# Patient Record
Sex: FEMALE | Race: WHITE | Employment: FULL TIME | URBAN - METROPOLITAN AREA
[De-identification: names, ages, dates, MRNs, and addresses within clinical notes are randomized per-mention and may not be internally consistent; named-entity substitution may affect disease eponyms.]

---

## 2017-07-12 ENCOUNTER — OFFICE VISIT (OUTPATIENT)
Dept: FAMILY MEDICINE CLINIC | Age: 37
End: 2017-07-12

## 2017-07-12 VITALS
WEIGHT: 228.8 LBS | HEIGHT: 68 IN | OXYGEN SATURATION: 98 % | RESPIRATION RATE: 13 BRPM | HEART RATE: 90 BPM | SYSTOLIC BLOOD PRESSURE: 118 MMHG | BODY MASS INDEX: 34.68 KG/M2 | DIASTOLIC BLOOD PRESSURE: 84 MMHG

## 2017-07-12 DIAGNOSIS — Z13.220 LIPID SCREENING: ICD-10-CM

## 2017-07-12 DIAGNOSIS — Z23 NEED FOR TDAP VACCINATION: ICD-10-CM

## 2017-07-12 DIAGNOSIS — Z13.29 THYROID DISORDER SCREENING: ICD-10-CM

## 2017-07-12 DIAGNOSIS — M25.50 ARTHRALGIA, UNSPECIFIED JOINT: ICD-10-CM

## 2017-07-12 DIAGNOSIS — K21.9 GASTROESOPHAGEAL REFLUX DISEASE WITHOUT ESOPHAGITIS: ICD-10-CM

## 2017-07-12 DIAGNOSIS — R14.0 ABDOMINAL BLOATING: Primary | ICD-10-CM

## 2017-07-12 DIAGNOSIS — K59.01 SLOW TRANSIT CONSTIPATION: ICD-10-CM

## 2017-07-12 LAB
BILIRUBIN, POC: NORMAL
BLOOD URINE, POC: NORMAL
CLARITY, POC: CLEAR
COLOR, POC: YELLOW
GLUCOSE URINE, POC: NORMAL
KETONES, POC: NORMAL
LEUKOCYTE EST, POC: NORMAL
NITRITE, POC: NORMAL
PH, POC: 5.5
PROTEIN, POC: NORMAL
SPECIFIC GRAVITY, POC: 1.02
UROBILINOGEN, POC: 0.2

## 2017-07-12 PROCEDURE — 99204 OFFICE O/P NEW MOD 45 MIN: CPT | Performed by: NURSE PRACTITIONER

## 2017-07-12 PROCEDURE — 90471 IMMUNIZATION ADMIN: CPT | Performed by: NURSE PRACTITIONER

## 2017-07-12 PROCEDURE — 90715 TDAP VACCINE 7 YRS/> IM: CPT | Performed by: NURSE PRACTITIONER

## 2017-07-12 PROCEDURE — 81002 URINALYSIS NONAUTO W/O SCOPE: CPT | Performed by: NURSE PRACTITIONER

## 2017-07-12 RX ORDER — MELOXICAM 15 MG/1
15 TABLET ORAL DAILY
Qty: 30 TABLET | Refills: 3 | Status: SHIPPED | OUTPATIENT
Start: 2017-07-12 | End: 2017-11-06 | Stop reason: ALTCHOICE

## 2017-07-12 RX ORDER — FAMOTIDINE 20 MG/1
20 TABLET, FILM COATED ORAL 2 TIMES DAILY
Qty: 60 TABLET | Refills: 3 | Status: SHIPPED | OUTPATIENT
Start: 2017-07-12 | End: 2017-09-05 | Stop reason: ALTCHOICE

## 2017-07-12 ASSESSMENT — ENCOUNTER SYMPTOMS
CHEST TIGHTNESS: 0
CONSTIPATION: 1
NAUSEA: 0
BLOOD IN STOOL: 0
BACK PAIN: 1
COUGH: 0
VOMITING: 0
ABDOMINAL PAIN: 1

## 2017-07-12 ASSESSMENT — PATIENT HEALTH QUESTIONNAIRE - PHQ9
SUM OF ALL RESPONSES TO PHQ9 QUESTIONS 1 & 2: 0
1. LITTLE INTEREST OR PLEASURE IN DOING THINGS: 0
SUM OF ALL RESPONSES TO PHQ QUESTIONS 1-9: 0
2. FEELING DOWN, DEPRESSED OR HOPELESS: 0

## 2017-08-09 ENCOUNTER — OFFICE VISIT (OUTPATIENT)
Dept: FAMILY MEDICINE CLINIC | Age: 37
End: 2017-08-09

## 2017-08-09 VITALS
OXYGEN SATURATION: 98 % | BODY MASS INDEX: 35.07 KG/M2 | SYSTOLIC BLOOD PRESSURE: 122 MMHG | HEART RATE: 65 BPM | DIASTOLIC BLOOD PRESSURE: 80 MMHG | HEIGHT: 68 IN | WEIGHT: 231.4 LBS | RESPIRATION RATE: 11 BRPM

## 2017-08-09 DIAGNOSIS — K21.9 GASTROESOPHAGEAL REFLUX DISEASE WITHOUT ESOPHAGITIS: ICD-10-CM

## 2017-08-09 DIAGNOSIS — K58.9 IRRITABLE BOWEL SYNDROME WITHOUT DIARRHEA: ICD-10-CM

## 2017-08-09 DIAGNOSIS — R14.0 ABDOMINAL BLOATING: Primary | ICD-10-CM

## 2017-08-09 PROCEDURE — 99213 OFFICE O/P EST LOW 20 MIN: CPT | Performed by: NURSE PRACTITIONER

## 2017-08-09 RX ORDER — DICYCLOMINE HCL 20 MG
20 TABLET ORAL DAILY PRN
Qty: 30 TABLET | Refills: 3 | Status: SHIPPED | OUTPATIENT
Start: 2017-08-09 | End: 2017-09-05 | Stop reason: ALTCHOICE

## 2017-08-09 ASSESSMENT — ENCOUNTER SYMPTOMS
COUGH: 0
VOMITING: 0
CHEST TIGHTNESS: 0
BACK PAIN: 0
NAUSEA: 0
CONSTIPATION: 0
DIARRHEA: 0

## 2017-09-08 ENCOUNTER — OFFICE VISIT (OUTPATIENT)
Dept: FAMILY MEDICINE CLINIC | Age: 37
End: 2017-09-08

## 2017-09-08 VITALS
TEMPERATURE: 98.5 F | DIASTOLIC BLOOD PRESSURE: 82 MMHG | BODY MASS INDEX: 35.71 KG/M2 | RESPIRATION RATE: 18 BRPM | WEIGHT: 228 LBS | OXYGEN SATURATION: 97 % | SYSTOLIC BLOOD PRESSURE: 126 MMHG | HEART RATE: 86 BPM

## 2017-09-08 DIAGNOSIS — L53.9 SKIN ERYTHEMA: ICD-10-CM

## 2017-09-08 DIAGNOSIS — L03.011 PARONYCHIA, FINGER, RIGHT: ICD-10-CM

## 2017-09-08 PROCEDURE — 99213 OFFICE O/P EST LOW 20 MIN: CPT | Performed by: NURSE PRACTITIONER

## 2017-09-13 ASSESSMENT — ENCOUNTER SYMPTOMS
SHORTNESS OF BREATH: 0
COUGH: 0
WHEEZING: 0
RESPIRATORY NEGATIVE: 1

## 2017-09-28 ENCOUNTER — HOSPITAL ENCOUNTER (OUTPATIENT)
Dept: GENERAL RADIOLOGY | Age: 37
Discharge: HOME | End: 2017-09-28

## 2017-09-28 LAB
A/G RATIO: 1.1 (ref 1.1–2.2)
ALBUMIN SERPL-MCNC: 4.2 G/DL (ref 3.4–5)
ALP BLD-CCNC: 84 U/L (ref 40–129)
ALT SERPL-CCNC: 20 U/L (ref 10–40)
ANION GAP SERPL CALCULATED.3IONS-SCNC: 18 MMOL/L (ref 3–16)
AST SERPL-CCNC: 18 U/L (ref 15–37)
BASOPHILS ABSOLUTE: 0 K/UL (ref 0–0.2)
BASOPHILS RELATIVE PERCENT: 0 %
BILIRUB SERPL-MCNC: 0.3 MG/DL (ref 0–1)
BUN BLDV-MCNC: 10 MG/DL (ref 7–20)
CALCIUM SERPL-MCNC: 9.5 MG/DL (ref 8.3–10.6)
CHLORIDE BLD-SCNC: 100 MMOL/L (ref 99–110)
CHOLESTEROL, FASTING: 229 MG/DL (ref 0–199)
CO2: 20 MMOL/L (ref 21–32)
CREAT SERPL-MCNC: 0.6 MG/DL (ref 0.6–1.1)
EOSINOPHILS ABSOLUTE: 0.2 K/UL (ref 0–0.6)
EOSINOPHILS RELATIVE PERCENT: 2 %
GFR AFRICAN AMERICAN: >60
GFR NON-AFRICAN AMERICAN: >60
GLOBULIN: 3.9 G/DL
GLUCOSE FASTING: 100 MG/DL (ref 70–99)
HCT VFR BLD CALC: 39.9 % (ref 36–48)
HDLC SERPL-MCNC: 42 MG/DL (ref 40–60)
HEMOGLOBIN: 13.7 G/DL (ref 12–16)
HYPOCHROMIA: ABNORMAL
LDL CHOLESTEROL CALCULATED: 168 MG/DL
LYMPHOCYTES ABSOLUTE: 2.4 K/UL (ref 1–5.1)
LYMPHOCYTES RELATIVE PERCENT: 29 %
MCH RBC QN AUTO: 30 PG (ref 26–34)
MCHC RBC AUTO-ENTMCNC: 34.3 G/DL (ref 31–36)
MCV RBC AUTO: 87.4 FL (ref 80–100)
MONOCYTES ABSOLUTE: 1 K/UL (ref 0–1.3)
MONOCYTES RELATIVE PERCENT: 12 %
NEUTROPHILS ABSOLUTE: 4.7 K/UL (ref 1.7–7.7)
NEUTROPHILS RELATIVE PERCENT: 57 %
PDW BLD-RTO: 12.8 % (ref 12.4–15.4)
PLATELET # BLD: 290 K/UL (ref 135–450)
PMV BLD AUTO: 9.3 FL (ref 5–10.5)
POTASSIUM SERPL-SCNC: 4.3 MMOL/L (ref 3.5–5.1)
RBC # BLD: 4.56 M/UL (ref 4–5.2)
RHEUMATOID FACTOR: <10 IU/ML
SEDIMENTATION RATE, ERYTHROCYTE: 33 MM/HR (ref 0–20)
SODIUM BLD-SCNC: 138 MMOL/L (ref 136–145)
TOTAL PROTEIN: 8.1 G/DL (ref 6.4–8.2)
TRIGLYCERIDE, FASTING: 94 MG/DL (ref 0–150)
TSH REFLEX: 1.22 UIU/ML (ref 0.27–4.2)
URIC ACID, SERUM: 5.2 MG/DL (ref 2.6–6)
VLDLC SERPL CALC-MCNC: 19 MG/DL
WBC # BLD: 8.2 K/UL (ref 4–11)

## 2017-10-03 ENCOUNTER — TELEPHONE (OUTPATIENT)
Dept: FAMILY MEDICINE CLINIC | Age: 37
End: 2017-10-03

## 2017-10-03 DIAGNOSIS — G89.29 CHRONIC PAIN OF BOTH KNEES: Primary | ICD-10-CM

## 2017-10-03 DIAGNOSIS — M25.561 CHRONIC PAIN OF BOTH KNEES: Primary | ICD-10-CM

## 2017-10-03 DIAGNOSIS — M25.562 CHRONIC PAIN OF BOTH KNEES: Primary | ICD-10-CM

## 2017-10-10 ENCOUNTER — OFFICE VISIT (OUTPATIENT)
Dept: ORTHOPEDIC SURGERY | Age: 37
End: 2017-10-10

## 2017-10-10 VITALS
BODY MASS INDEX: 35.78 KG/M2 | SYSTOLIC BLOOD PRESSURE: 127 MMHG | WEIGHT: 227.96 LBS | HEIGHT: 67 IN | DIASTOLIC BLOOD PRESSURE: 86 MMHG | HEART RATE: 71 BPM

## 2017-10-10 DIAGNOSIS — M22.2X2 PATELLOFEMORAL PAIN SYNDROME OF BOTH KNEES: Primary | ICD-10-CM

## 2017-10-10 DIAGNOSIS — M22.2X1 PATELLOFEMORAL PAIN SYNDROME OF BOTH KNEES: Primary | ICD-10-CM

## 2017-10-10 DIAGNOSIS — M25.561 PAIN IN BOTH KNEES, UNSPECIFIED CHRONICITY: ICD-10-CM

## 2017-10-10 DIAGNOSIS — M22.40 CHONDROMALACIA PATELLAE SYNDROME, UNSPECIFIED LATERALITY: ICD-10-CM

## 2017-10-10 DIAGNOSIS — M25.562 PAIN IN BOTH KNEES, UNSPECIFIED CHRONICITY: ICD-10-CM

## 2017-10-10 PROCEDURE — 73564 X-RAY EXAM KNEE 4 OR MORE: CPT | Performed by: PHYSICIAN ASSISTANT

## 2017-10-10 PROCEDURE — 99203 OFFICE O/P NEW LOW 30 MIN: CPT | Performed by: PHYSICIAN ASSISTANT

## 2017-10-10 RX ORDER — DICLOFENAC SODIUM 75 MG/1
75 TABLET, DELAYED RELEASE ORAL 2 TIMES DAILY
Qty: 60 TABLET | Refills: 3 | Status: SHIPPED | OUTPATIENT
Start: 2017-10-10 | End: 2018-05-30 | Stop reason: ALTCHOICE

## 2017-10-10 NOTE — PROGRESS NOTES
patient's mood and affect are appropriate. Lymphatic: The lymphatic examination bilaterally reveals all areas to be without enlargement or induration. Vascular: Examination reveals no swelling or calf tenderness. Peripheral pulses are palpable and 2+. Neurological: The patient has good coordination. There is no weakness or sensory deficit. Right and left Knee Examination:    Inspection:  No swelling, ecchymosis, deformity. Positive crepitations patellofemoral joint with full extensions. Palpation:  There is palpation over the lateral facet. No significant medial or lateral joint line pain. Range of Motion:  0-120° of knee flexion. Mild retropatellar crepitation. Tight hamstrings noted. Strength:  4/5 quad strength. 4+/5 hamstring strength. Special Tests:  Negative Iveth's exam.  Negative Lachman's exam.  Stable to varus and valgus stress testing. Positive patellar grind. Skin: There are no rashes, ulcerations or lesions. Gait: Normal gait pattern    Reflex normal deep tendon reflexes    Additional Comments:       Additional Examinations:         Examination of the right and left hip reveals intact skin. The patient demonstrates full painless range of motion with regards to flexion, abduction, internal and external rotation. There is no tenderness about the greater trochanter. There is a negative straight leg raise against resistance. Strength is 5/5 throughout all planes. Radiology:     X-rays obtained and reviewed in office:  Views 4 views including AP weightbearing, PA flexed weightbearing, lateral and skyline  Location right left knee  Impression there is relatively well-maintained joint space of all 3 compartments with no evidence of any high-grade arthritic changes, fractures, dislocations. Patient does have some osteophyte formation in her patellofemoral joint. Impression:  Encounter Diagnoses   Name Primary?     Pain in both knees, unspecified chronicity    

## 2017-11-06 ENCOUNTER — OFFICE VISIT (OUTPATIENT)
Dept: ORTHOPEDIC SURGERY | Age: 37
End: 2017-11-06

## 2017-11-06 VITALS — BODY MASS INDEX: 35.78 KG/M2 | WEIGHT: 227.96 LBS | HEIGHT: 67 IN

## 2017-11-06 DIAGNOSIS — M76.52 PATELLAR TENDINITIS OF BOTH KNEES: Primary | ICD-10-CM

## 2017-11-06 DIAGNOSIS — M76.51 PATELLAR TENDINITIS OF BOTH KNEES: Primary | ICD-10-CM

## 2017-11-06 PROCEDURE — G8427 DOCREV CUR MEDS BY ELIG CLIN: HCPCS | Performed by: ORTHOPAEDIC SURGERY

## 2017-11-06 PROCEDURE — G8484 FLU IMMUNIZE NO ADMIN: HCPCS | Performed by: ORTHOPAEDIC SURGERY

## 2017-11-06 PROCEDURE — G8417 CALC BMI ABV UP PARAM F/U: HCPCS | Performed by: ORTHOPAEDIC SURGERY

## 2017-11-06 PROCEDURE — 99213 OFFICE O/P EST LOW 20 MIN: CPT | Performed by: ORTHOPAEDIC SURGERY

## 2017-11-06 PROCEDURE — 1036F TOBACCO NON-USER: CPT | Performed by: ORTHOPAEDIC SURGERY

## 2017-11-06 RX ORDER — DICLOFENAC SODIUM 75 MG/1
75 TABLET, DELAYED RELEASE ORAL 2 TIMES DAILY
Qty: 60 TABLET | Refills: 3 | Status: SHIPPED | OUTPATIENT
Start: 2017-11-06 | End: 2018-05-30 | Stop reason: ALTCHOICE

## 2017-11-06 NOTE — PROGRESS NOTES
Chief Complaint    Follow-up (COLLEEN knee persist; improved with NSAID's; but still has painful days with increased activity and avoidance of medication)      History of Present Illness:  Saad Casanova is a 43 y.o. female returns for follow-up of right and left knee pain. She fell directly on the anterior aspect of her knees several weeks ago. She was getting better with nonsteroidal anti-inflammatories and rest but she had fallen again recently. Most her pain is on the anterior aspect of her knees, she has pain walking, standing and squatting. Rest seems to make her better. The diclofenac also seems to decrease her level pain. Pain Assessment  Location of Pain: Knee  Location Modifiers: Left, Right  Severity of Pain: 7  Quality of Pain: Throbbing  Duration of Pain: Persistent  Frequency of Pain: Constant  Aggravating Factors: Walking, Standing, Squatting  Limiting Behavior: Some  Relieving Factors: Rest, Nsaids    Medical History:  Patient's medications, allergies, past medical, surgical, social and family histories were reviewed and updated as appropriate. Review of Systems:  Relevant review of systems reviewed and available in the patient's chart    Vital Signs:  Ht 5' 7.01\" (1.702 m)   Wt 227 lb 15.3 oz (103.4 kg)   BMI 35.69 kg/m²     General Exam:   Constitutional: Patient is adequately groomed with no evidence of malnutrition  DTRs: Deep tendon reflexes are intact  Mental Status: The patient is oriented to time, place and person. The patient's mood and affect are appropriate. Lymphatic: The lymphatic examination bilaterally reveals all areas to be without enlargement or induration. Vascular: Examination reveals no swelling or calf tenderness. Peripheral pulses are palpable and 2+. Neurological: The patient has good coordination. There is no weakness or sensory deficit.     Right and left Knee Examination:    Inspection:  No swelling, ecchymosis, deformity    Palpation:  There is palpation

## 2017-11-16 ENCOUNTER — HOSPITAL ENCOUNTER (OUTPATIENT)
Dept: PHYSICAL THERAPY | Age: 37
Discharge: HOME | End: 2017-11-30
Admitting: ORTHOPAEDIC SURGERY

## 2017-11-16 NOTE — PLAN OF CARE
[facility], [address]  Phone: [phone]  Fax [phone]     Physical Therapy Certification    Dear Referring Practitioner: Lenore Arias,    We had the pleasure of evaluating the following patient for physical therapy services at [address] [place] [address]. A summary of our findings can be found in the initial assessment below. This includes our plan of care. If you have any questions or concerns regarding these findings, please do not hesitate to contact me at the office phone number checked above. Thank you for the referral.       Physician Signature:_______________________________Date:__________________  By signing above (or electronic signature), therapists plan is approved by physician    Patient: Edmar Lafleur   : 1980   MRN: [id number]  Referring Physician: Referring Practitioner: Lenore Arias      Evaluation Date: 2017      Medical Diagnosis Information:  Diagnosis: Bilateral patellar tendinitis (M76.51, M76.52)   Treatment Diagnosis: Bilateral knee pain (M25.561, M25.562)                                         Insurance information: PT Insurance Information: [facility]     Precautions/ Contra-indications: None  Latex Allergy:  []NO      [x]YES  Preferred Language for Healthcare:   [x]English       []other:    SUBJECTIVE: Patient stated complaint: Patient reports she has been having pain in her knees for about 4 years ago when she fell and landed on both knees. She never got it taken care of and it gradually got worse. X rays showed arthritis. Originally pain was located more in her right knee, but has gradually gotten pain in her left knee. Pain is more general than localized. Hurts more towards the end of the day. If she is standing for a while, gets some pain down into her foot.      Relevant Medical History:No significant PMH  Functional Disability Index:PT G-Codes  Functional Assessment Tool Used: LEFS  Score: 61%  Functional Limitation: Mobility: Walking and moving around  Mobility: Walking and Moving Around Current Status (): At least 60 percent but less than 80 percent impaired, limited or restricted  Mobility: Walking and Moving Around Goal Status (): At least 40 percent but less than 60 percent impaired, limited or restricted    Pain Scale: 5-10/10  Easing factors: Rest, medication  Provocative factors: Standing a lot, sitting for long periods of time, stairs     Type: []Constant   [x]Intermittent  []Radiating []Localized []other:     Numbness/Tingling: Denies N/T    Occupation/School:  at [employer]    Living Status/Prior Level of Function: Independent with ADLs and IADLs,     OBJECTIVE:     ROM LEFT RIGHT   HIP Flex     HIP Abd     HIP Ext     HIP IR     HIP ER     Knee ext 0 0   Knee Flex 120 114   Ankle PF     Ankle DF     Ankle In     Ankle Ev     Strength  LEFT RIGHT   HIP Flexors 4/5 4+/5   HIP Abductors 4-/5 4-/5   HIP Ext     Hip ER     Knee EXT (quad) 4+/5 4+/5   Knee Flex (HS) 4/5 4/5   Ankle DF     Ankle PF     Ankle Inv     Ankle EV          Circumference  Mid apex  7 cm prox             Reflexes/Sensation:    []Dermatomes/Myotomes intact    [x]Reflexes equal and normal bilaterally   []Other:    Joint mobility:    [x]Normal    []Hypo   []Hyper    Palpation: No TTP this date    Functional Mobility/Transfers: Independent    Posture: Fair    Bandages/Dressings/Incisions: None    Gait: (include devices/WB status) Ambulating with decreased stride length bilaterally. Orthopedic Special Tests: Patellar grind -                       [x] Patient history, allergies, meds reviewed. Medical chart reviewed. See intake form. Review Of Systems (ROS):  [x]Performed Review of systems (Integumentary, CardioPulmonary, Neurological) by intake and observation. Intake form has been scanned into medical record.  Patient has been instructed to contact their primary care physician regarding ROS issues if not already being addressed at this time. Co-morbidities/Complexities (which will affect course of rehabilitation):   []None           Arthritic conditions   []Rheumatoid arthritis (M05.9)  []Osteoarthritis (M19.91)   Cardiovascular conditions   []Hypertension (I10)  []Hyperlipidemia (E78.5)  []Angina pectoris (I20)  []Atherosclerosis (I70)   Musculoskeletal conditions   []Disc pathology   []Congenital spine pathologies   []Prior surgical intervention  []Osteoporosis (M81.8)  []Osteopenia (M85.8)   Endocrine conditions   []Hypothyroid (E03.9)  []Hyperthyroid Gastrointestinal conditions   []Constipation (F92.22)   Metabolic conditions   []Morbid obesity (E66.01)  []Diabetes type 1(E10.65) or 2 (E11.65)   []Neuropathy (G60.9)     Pulmonary conditions   []Asthma (J45)  []Coughing   []COPD (J44.9)   Psychological Disorders  []Anxiety (F41.9)  [x]Depression (F32.9)   []Other:   []Other:          Barriers to/and or personal factors that will affect rehab potential:              []Age  []Sex              []Motivation/Lack of Motivation                        [x]Co-Morbidities              []Cognitive Function, education/learning barriers              []Environmental, home barriers              []profession/work barriers  []past PT/medical experience  []other:  Justification: See above    Falls Risk Assessment (30 days):   [x] Falls Risk assessed and no intervention required. [] Falls Risk assessed and Patient requires intervention due to being higher risk   TUG score (>12s at risk):     [] Falls education provided, including       G-Codes:  PT G-Codes  Functional Assessment Tool Used: LEFS  Score: 61%  Functional Limitation: Mobility: Walking and moving around  Mobility: Walking and Moving Around Current Status (): At least 60 percent but less than 80 percent impaired, limited or restricted  Mobility: Walking and Moving Around Goal Status ():  At least 40 percent but needed that may include: thermal agents, E-stim, Biofeedback, US, iontophoresis as indicated  [x] Patient education on joint protection, postural re-education, activity modification, progression of HEP. HEP instruction: Handout given to patient this date (see flowsheet)    GOALS:  Patient stated goal: \"Decrease pain. \"    Therapist goals for Patient:   Short Term Goals: To be achieved in: 2 weeks  1. Independent in HEP and progression per patient tolerance, in order to prevent re-injury. 2. Patient will have a decrease in pain to facilitate improvement in movement, function, and ADLs as indicated by Functional Deficits. Long Term Goals: To be achieved in: 8 weeks  1. Disability index score of 40% or less for the LEFS to assist with reaching prior level of function. 2. Patient will demonstrate increased AROM of right knee flexion to 120 degrees to allow for proper joint functioning as indicated by patients Functional Deficits. 3. Patient will demonstrate an increase in Strength in bilateral knee flexion and hip flexion to 4+/5, and bilateral hip abduction to 4/5 to allow for proper functional mobility as indicated by patients Functional Deficits. 4. Patient will be able to ascend/descend a flight of stairs without pain.    5. Patient will be able to drive without pain. (patient specific functional goal)       Electronically signed by:  Ramya Amado PT, DPT [id number]

## 2017-11-16 NOTE — FLOWSHEET NOTE
Therapeutic Exercise and NMR EXR  [x] (86568) Provided verbal/tactile cueing for activities related to strengthening, flexibility, endurance, ROM for improvements in LE, proximal hip, and core control with self care, mobility, lifting, ambulation.  [] (51880) Provided verbal/tactile cueing for activities related to improving balance, coordination, kinesthetic sense, posture, motor skill, proprioception  to assist with LE, proximal hip, and core control in self care, mobility, lifting, ambulation and eccentric single leg control.      NMR and Therapeutic Activities:    [] (49368 or 50447) Provided verbal/tactile cueing for activities related to improving balance, coordination, kinesthetic sense, posture, motor skill, proprioception and motor activation to allow for proper function of core, proximal hip and LE with self care and ADLs  [] (43321) Gait Re-education- Provided training and instruction to the patient for proper LE, core and proximal hip recruitment and positioning and eccentric body weight control with ambulation re-education including up and down stairs     Home Exercise Program:    [x] (35787) Reviewed/Progressed HEP activities related to strengthening, flexibility, endurance, ROM of core, proximal hip and LE for functional self-care, mobility, lifting and ambulation/stair navigation   [] (96175)Reviewed/Progressed HEP activities related to improving balance, coordination, kinesthetic sense, posture, motor skill, proprioception of core, proximal hip and LE for self care, mobility, lifting, and ambulation/stair navigation      Manual Treatments:  PROM / STM / Oscillations-Mobs:  G-I, II, III, IV (PA's, Inf., Post.)  [] (49701) Provided manual therapy to mobilize LE, proximal hip and/or LS spine soft tissue/joints for the purpose of modulating pain, promoting relaxation,  increasing ROM, reducing/eliminating soft tissue swelling/inflammation/restriction, improving soft tissue extensibility and

## 2017-11-22 ENCOUNTER — HOSPITAL ENCOUNTER (OUTPATIENT)
Dept: PHYSICAL THERAPY | Age: 37
Discharge: HOME | End: 2017-11-22
Admitting: ORTHOPAEDIC SURGERY

## 2017-11-22 NOTE — FLOWSHEET NOTE
58378 (typically 30 minutes face-to-face)  [] EVAL (HIGH) 52985 (typically 45 minutes face-to-face)  [] RE-EVAL     [x] LN(04880) x  2   [] IONTO  [] NMR (19389) x      [] VASO  [x] Manual (11757) x  1    [] Other:  [] TA x       [] Mech Traction (11174)  [] ES(attended) (13468)      [] ES (un) (75848):     GOALS:   Short Term Goals: To be achieved in: 2 weeks  1. Independent in HEP and progression per patient tolerance, in order to prevent re-injury. 2. Patient will have a decrease in pain to facilitate improvement in movement, function, and ADLs as indicated by Functional Deficits.     Long Term Goals: To be achieved in: 8 weeks  1. Disability index score of 40% or less for the LEFS to assist with reaching prior level of function. 2. Patient will demonstrate increased AROM of right knee flexion to 120 degrees to allow for proper joint functioning as indicated by patients Functional Deficits. 3. Patient will demonstrate an increase in Strength in bilateral knee flexion and hip flexion to 4+/5, and bilateral hip abduction to 4/5 to allow for proper functional mobility as indicated by patients Functional Deficits. 4. Patient will be able to ascend/descend a flight of stairs without pain. 5. Patient will be able to drive without pain. (patient specific functional goal)           Progression Towards Functional goals:  [] Patient is progressing as expected towards functional goals listed. [] Progression is slowed due to complexities listed. [] Progression has been slowed due to co-morbidities. [x] Plan just implemented, too soon to assess goals progression  [] Other:     ASSESSMENT:  Patient fatigues quickly with TE. Needs frequent rest breaks.    Treatment/Activity Tolerance:  [x] Patient tolerated treatment well [] Patient limited by fatique  [] Patient limited by pain  [] Patient limited by other medical complications  [] Other:     Prognosis: [] Good [x] Fair  [] Poor    Patient Requires Follow-up: [x] Yes  [] No    PLAN: See eval  [x] Continue per plan of care [] Alter current plan (see comments)  [] Plan of care initiated [] Hold pending MD visit [] Discharge    Electronically signed by: Christian Holstein, [facility], DPT [id number]

## 2017-11-28 ENCOUNTER — HOSPITAL ENCOUNTER (OUTPATIENT)
Dept: PHYSICAL THERAPY | Age: 37
Discharge: HOME | End: 2017-11-28
Admitting: ORTHOPAEDIC SURGERY

## 2017-11-28 NOTE — FLOWSHEET NOTE
Prognosis: [] Good [x] Fair  [] Poor    Patient Requires Follow-up: [x] Yes  [] No    PLAN: See eval  [x] Continue per plan of care [] Alter current plan (see comments)  [] Plan of care initiated [] Hold pending MD visit [] Discharge    Electronically signed by: Zaid Goodwin, PT, DPT [id number]

## 2017-12-01 ENCOUNTER — HOSPITAL ENCOUNTER (OUTPATIENT)
Dept: PHYSICAL THERAPY | Age: 37
Discharge: HOME | End: 2017-12-31
Attending: ORTHOPAEDIC SURGERY | Admitting: ORTHOPAEDIC SURGERY

## 2017-12-07 ENCOUNTER — OFFICE VISIT (OUTPATIENT)
Dept: ORTHOPEDIC SURGERY | Age: 37
End: 2017-12-07

## 2017-12-07 VITALS — BODY MASS INDEX: 34.53 KG/M2 | HEIGHT: 67 IN | WEIGHT: 220 LBS

## 2017-12-07 DIAGNOSIS — M25.562 CHRONIC PAIN OF BOTH KNEES: Primary | ICD-10-CM

## 2017-12-07 DIAGNOSIS — G89.29 CHRONIC PAIN OF BOTH KNEES: Primary | ICD-10-CM

## 2017-12-07 DIAGNOSIS — M25.561 CHRONIC PAIN OF BOTH KNEES: Primary | ICD-10-CM

## 2017-12-07 PROCEDURE — G8427 DOCREV CUR MEDS BY ELIG CLIN: HCPCS | Performed by: ORTHOPAEDIC SURGERY

## 2017-12-07 PROCEDURE — 99213 OFFICE O/P EST LOW 20 MIN: CPT | Performed by: ORTHOPAEDIC SURGERY

## 2017-12-07 PROCEDURE — 1036F TOBACCO NON-USER: CPT | Performed by: ORTHOPAEDIC SURGERY

## 2017-12-07 PROCEDURE — G8417 CALC BMI ABV UP PARAM F/U: HCPCS | Performed by: ORTHOPAEDIC SURGERY

## 2017-12-07 PROCEDURE — G8484 FLU IMMUNIZE NO ADMIN: HCPCS | Performed by: ORTHOPAEDIC SURGERY

## 2017-12-07 NOTE — PROGRESS NOTES
Chief Complaint    Knee Pain (bilat knee ongoing pain, PT helps some)      History of Present Illness:  Raul De La Cruz is a 42 y.o. female returns for follow-up of right and left knee pain. She fell directly on the anterior aspect of her knees several weeks ago. She's had a one-year history of right knee pain. Most her pain is on the anterior aspect of her knees, she has pain walking, standing and squatting. She does have intermittent medial knee pain as well. We tried several weeks of outpatient physical therapy and oral anti-inflammatories cc  Little to no improvement. Right pain is greater than her left. Pain Assessment  Location of Pain: Knee  Location Modifiers: Right, Left  Severity of Pain: 10  Aggravating Factors: Walking, Standing  Limiting Behavior: Some  Result of Injury: No  Work-Related Injury: No  Are there other pain locations you wish to document?: No    Medical History:  Patient's medications, allergies, past medical, surgical, social and family histories were reviewed and updated as appropriate. Review of Systems:  Relevant review of systems reviewed and available in the patient's chart    Vital Signs:  Ht 5' 7\" (1.702 m)   Wt 220 lb (99.8 kg)   BMI 34.46 kg/m²     General Exam:   Constitutional: Patient is adequately groomed with no evidence of malnutrition  DTRs: Deep tendon reflexes are intact  Mental Status: The patient is oriented to time, place and person. The patient's mood and affect are appropriate. Lymphatic: The lymphatic examination bilaterally reveals all areas to be without enlargement or induration. Vascular: Examination reveals no swelling or calf tenderness. Peripheral pulses are palpable and 2+. Neurological: The patient has good coordination. There is no weakness or sensory deficit. Right and left Knee Examination:    Inspection:  No swelling, ecchymosis, deformity    Palpation:  There is palpation over the lateral facet.   There is palpation of the medial joint line. Range of Motion:  0-120° of knee flexion. Mild retropatellar crepitation. Tight hamstrings noted. Strength:  4/5 quad strength. 4+/5 hamstring strength. Special Tests:  Positive Iveth's examination on the right. Negative Lachman's exam.  Stable to varus and valgus stress testing. Positive patellar grind. She also has pain directly inferior pole of patella patellar tendon no evidence of patellar tendon tear. Skin: There are no rashes, ulcerations or lesions. Gait: Normal gait pattern    Reflex normal deep tendon reflexes    Additional Comments:       Examination of the right and left hip reveals intact skin. The patient demonstrates full painless range of motion with regards to flexion, abduction, internal and external rotation. There is no tenderness about the greater trochanter. There is a negative straight leg raise against resistance. Strength is 5/5 throughout all planes. Impression:  Encounter Diagnosis   Name Primary?  Chronic pain of both knees Yes       Office Procedures:  Orders Placed This Encounter   Procedures    MRI Knee Right W JT WO Contrast     Scheduling Instructions:      [employer] [facility] [phone]      MRI RT KNEE W/O CONTRAST      M[facility]      [facility]            SCHED: ONCE AUTHORIZED       Treatment Plan: At this point the patient has continued to have pain despite conservative treatment consisting of physical therapy and oral anti-inflammatories. We would recommend proceeding with an MRI of the right knee to evaluate she has any signs of any meniscal pathology since she does present with some medial joint line pain with a positive Iveth's examination. We'll see her back after the MRI to go over the results and discuss the options for treatment.

## 2018-01-02 ENCOUNTER — OFFICE VISIT (OUTPATIENT)
Dept: ORTHOPEDIC SURGERY | Age: 38
End: 2018-01-02

## 2018-01-02 VITALS — HEIGHT: 67 IN | BODY MASS INDEX: 34.53 KG/M2 | WEIGHT: 220.02 LBS

## 2018-01-02 DIAGNOSIS — M22.41 CHONDROMALACIA OF RIGHT PATELLA: ICD-10-CM

## 2018-01-02 DIAGNOSIS — M23.51 OLD COMPLETE ACL TEAR, RIGHT: Primary | ICD-10-CM

## 2018-01-02 PROCEDURE — 99213 OFFICE O/P EST LOW 20 MIN: CPT | Performed by: PHYSICIAN ASSISTANT

## 2018-01-02 NOTE — PROGRESS NOTES
significant medial or lateral joint line pain. Range of Motion:  0-120° of knee flexion. Mild retropatellar crepitation. Tight hamstrings noted. Strength:  4/5 quad strength. 4+/5 hamstring strength. Special Tests:  Negative Iveth's exam.  Positive Lachman's exam.  Stable to varus and valgus stress testing. Positive patellar grind. Skin: There are no rashes, ulcerations or lesions. Gait: Normal gait pattern    Reflex normal deep tendon reflexes    Additional Comments:       Additional Examinations:         Contralateral Exam: Examination of the left knee reveals intact skin. There is no focal tenderness. The patient demonstrates full painless range of motion with regard to flexion and extension. Strength is 5/5 throughout all planes. Ligamentous stability is grossly intact. Examination of the right and left hip reveals intact skin. The patient demonstrates full painless range of motion with regards to flexion, abduction, internal and external rotation. There is no tenderness about the greater trochanter. There is a negative straight leg raise against resistance. Strength is 5/5 throughout all planes. Radiology:         MRI results right knee                   Impression:  Encounter Diagnoses   Name Primary?  Old complete ACL tear, right Yes    Chondromalacia of right patella        Office Procedures:  No orders of the defined types were placed in this encounter. Treatment Plan:      Patient overall continues to be symptomatic with progressively increasing symptoms. She complains of buckling and instability despite physical therapy. She is considering ACL reconstruction with hamstring autograft.   She wishes to discuss it with her  and work and will follow-up with Dr. Dominique Aguiar MD. in approximately 4-5 weeks

## 2018-05-30 ENCOUNTER — OFFICE VISIT (OUTPATIENT)
Dept: FAMILY MEDICINE CLINIC | Age: 38
End: 2018-05-30

## 2018-05-30 VITALS
SYSTOLIC BLOOD PRESSURE: 120 MMHG | BODY MASS INDEX: 35.7 KG/M2 | DIASTOLIC BLOOD PRESSURE: 88 MMHG | WEIGHT: 228 LBS | OXYGEN SATURATION: 100 % | HEART RATE: 75 BPM

## 2018-05-30 DIAGNOSIS — D22.9 CHANGE IN MOLE: Primary | ICD-10-CM

## 2018-05-30 PROCEDURE — 99213 OFFICE O/P EST LOW 20 MIN: CPT | Performed by: PHYSICIAN ASSISTANT

## 2018-05-30 ASSESSMENT — ENCOUNTER SYMPTOMS
ROS SKIN COMMENTS: CHANGE IN MOLE
SHORTNESS OF BREATH: 0

## 2018-06-12 ENCOUNTER — INITIAL CONSULT (OUTPATIENT)
Dept: SURGERY | Age: 38
End: 2018-06-12

## 2018-06-12 ENCOUNTER — SURG/PROC ORDERS (OUTPATIENT)
Dept: SURGERY | Age: 38
End: 2018-06-12

## 2018-06-12 VITALS
BODY MASS INDEX: 35.63 KG/M2 | HEART RATE: 67 BPM | HEIGHT: 67 IN | WEIGHT: 227 LBS | DIASTOLIC BLOOD PRESSURE: 80 MMHG | SYSTOLIC BLOOD PRESSURE: 122 MMHG

## 2018-06-12 DIAGNOSIS — L91.8 INFLAMED SKIN TAG: ICD-10-CM

## 2018-06-12 PROCEDURE — G8427 DOCREV CUR MEDS BY ELIG CLIN: HCPCS | Performed by: SURGERY

## 2018-06-12 PROCEDURE — 99242 OFF/OP CONSLTJ NEW/EST SF 20: CPT | Performed by: SURGERY

## 2018-06-12 PROCEDURE — G8417 CALC BMI ABV UP PARAM F/U: HCPCS | Performed by: SURGERY

## 2019-02-05 ENCOUNTER — TELEPHONE (OUTPATIENT)
Dept: FAMILY MEDICINE CLINIC | Age: 39
End: 2019-02-05

## 2019-02-05 ENCOUNTER — OFFICE VISIT (OUTPATIENT)
Dept: FAMILY MEDICINE CLINIC | Age: 39
End: 2019-02-05
Payer: MEDICAID

## 2019-02-05 ENCOUNTER — HOSPITAL ENCOUNTER (OUTPATIENT)
Dept: CT IMAGING | Age: 39
Discharge: HOME | End: 2019-02-05
Payer: MEDICAID

## 2019-02-05 VITALS
DIASTOLIC BLOOD PRESSURE: 86 MMHG | SYSTOLIC BLOOD PRESSURE: 126 MMHG | HEART RATE: 86 BPM | WEIGHT: 240 LBS | BODY MASS INDEX: 37.59 KG/M2 | OXYGEN SATURATION: 98 %

## 2019-02-05 DIAGNOSIS — H53.8 VISUAL BLURRINESS: ICD-10-CM

## 2019-02-05 DIAGNOSIS — G43.009 MIGRAINE WITHOUT AURA AND WITHOUT STATUS MIGRAINOSUS, NOT INTRACTABLE: ICD-10-CM

## 2019-02-05 DIAGNOSIS — H53.8 VISUAL BLURRINESS: Primary | ICD-10-CM

## 2019-02-05 DIAGNOSIS — Z83.3 FAMILY HISTORY OF DIABETES MELLITUS: ICD-10-CM

## 2019-02-05 LAB — HBA1C MFR BLD: 5.3 %

## 2019-02-05 PROCEDURE — G8484 FLU IMMUNIZE NO ADMIN: HCPCS | Performed by: PHYSICIAN ASSISTANT

## 2019-02-05 PROCEDURE — 83036 HEMOGLOBIN GLYCOSYLATED A1C: CPT | Performed by: PHYSICIAN ASSISTANT

## 2019-02-05 PROCEDURE — 70450 CT HEAD/BRAIN W/O DYE: CPT

## 2019-02-05 PROCEDURE — 99213 OFFICE O/P EST LOW 20 MIN: CPT | Performed by: PHYSICIAN ASSISTANT

## 2019-02-05 PROCEDURE — 1036F TOBACCO NON-USER: CPT | Performed by: PHYSICIAN ASSISTANT

## 2019-02-05 PROCEDURE — G8417 CALC BMI ABV UP PARAM F/U: HCPCS | Performed by: PHYSICIAN ASSISTANT

## 2019-02-05 PROCEDURE — G8427 DOCREV CUR MEDS BY ELIG CLIN: HCPCS | Performed by: PHYSICIAN ASSISTANT

## 2019-02-05 RX ORDER — RANITIDINE 150 MG/1
150 TABLET ORAL 2 TIMES DAILY PRN
Qty: 60 TABLET | Refills: 1 | Status: SHIPPED | OUTPATIENT
Start: 2019-02-05 | End: 2019-05-17

## 2019-02-05 ASSESSMENT — ENCOUNTER SYMPTOMS
EYE PAIN: 0
VOICE CHANGE: 0
CHEST TIGHTNESS: 0
EYE ITCHING: 0
SHORTNESS OF BREATH: 0
EYE DISCHARGE: 0
COUGH: 0
PHOTOPHOBIA: 0
TROUBLE SWALLOWING: 0
EYE REDNESS: 0
COLOR CHANGE: 0

## 2019-02-05 ASSESSMENT — PATIENT HEALTH QUESTIONNAIRE - PHQ9
SUM OF ALL RESPONSES TO PHQ QUESTIONS 1-9: 0
1. LITTLE INTEREST OR PLEASURE IN DOING THINGS: 0
DEPRESSION UNABLE TO ASSESS: FUNCTIONAL CAPACITY MOTIVATION LIMITS ACCURACY
SUM OF ALL RESPONSES TO PHQ9 QUESTIONS 1 & 2: 0
2. FEELING DOWN, DEPRESSED OR HOPELESS: 0
SUM OF ALL RESPONSES TO PHQ QUESTIONS 1-9: 0

## 2019-05-17 ENCOUNTER — OFFICE VISIT (OUTPATIENT)
Dept: FAMILY MEDICINE CLINIC | Age: 39
End: 2019-05-17
Payer: MEDICAID

## 2019-05-17 VITALS
HEIGHT: 68 IN | OXYGEN SATURATION: 98 % | WEIGHT: 239 LBS | BODY MASS INDEX: 36.22 KG/M2 | SYSTOLIC BLOOD PRESSURE: 116 MMHG | RESPIRATION RATE: 16 BRPM | DIASTOLIC BLOOD PRESSURE: 70 MMHG | TEMPERATURE: 98.6 F | HEART RATE: 78 BPM

## 2019-05-17 DIAGNOSIS — M25.561 CHRONIC PAIN OF BOTH KNEES: ICD-10-CM

## 2019-05-17 DIAGNOSIS — Z13.220 LIPID SCREENING: ICD-10-CM

## 2019-05-17 DIAGNOSIS — R53.82 CHRONIC FATIGUE: Primary | ICD-10-CM

## 2019-05-17 DIAGNOSIS — G89.29 CHRONIC PAIN OF BOTH KNEES: ICD-10-CM

## 2019-05-17 DIAGNOSIS — M25.562 CHRONIC PAIN OF BOTH KNEES: ICD-10-CM

## 2019-05-17 LAB
A/G RATIO: 1.3 (ref 1.1–2.2)
ALBUMIN SERPL-MCNC: 4 G/DL (ref 3.4–5)
ALP BLD-CCNC: 87 U/L (ref 40–129)
ALT SERPL-CCNC: 15 U/L (ref 10–40)
ANION GAP SERPL CALCULATED.3IONS-SCNC: 12 MMOL/L (ref 3–16)
AST SERPL-CCNC: 13 U/L (ref 15–37)
BASOPHILS ABSOLUTE: 0.1 K/UL (ref 0–0.2)
BASOPHILS RELATIVE PERCENT: 0.6 %
BILIRUB SERPL-MCNC: 0.4 MG/DL (ref 0–1)
BUN BLDV-MCNC: 9 MG/DL (ref 7–20)
CALCIUM SERPL-MCNC: 9.4 MG/DL (ref 8.3–10.6)
CHLORIDE BLD-SCNC: 100 MMOL/L (ref 99–110)
CHOLESTEROL, TOTAL: 233 MG/DL (ref 0–199)
CO2: 25 MMOL/L (ref 21–32)
CREAT SERPL-MCNC: 0.6 MG/DL (ref 0.6–1.1)
EOSINOPHILS ABSOLUTE: 0.2 K/UL (ref 0–0.6)
EOSINOPHILS RELATIVE PERCENT: 1.7 %
GFR AFRICAN AMERICAN: >60
GFR NON-AFRICAN AMERICAN: >60
GLOBULIN: 3.2 G/DL
GLUCOSE BLD-MCNC: 90 MG/DL (ref 70–99)
HCT VFR BLD CALC: 39.6 % (ref 36–48)
HDLC SERPL-MCNC: 45 MG/DL (ref 40–60)
HEMOGLOBIN: 13.2 G/DL (ref 12–16)
LDL CHOLESTEROL CALCULATED: 168 MG/DL
LYMPHOCYTES ABSOLUTE: 2.4 K/UL (ref 1–5.1)
LYMPHOCYTES RELATIVE PERCENT: 26.8 %
MCH RBC QN AUTO: 29.6 PG (ref 26–34)
MCHC RBC AUTO-ENTMCNC: 33.3 G/DL (ref 31–36)
MCV RBC AUTO: 88.9 FL (ref 80–100)
MONOCYTES ABSOLUTE: 0.6 K/UL (ref 0–1.3)
MONOCYTES RELATIVE PERCENT: 6.6 %
NEUTROPHILS ABSOLUTE: 5.8 K/UL (ref 1.7–7.7)
NEUTROPHILS RELATIVE PERCENT: 64.3 %
PDW BLD-RTO: 13.4 % (ref 12.4–15.4)
PLATELET # BLD: 321 K/UL (ref 135–450)
PMV BLD AUTO: 8.6 FL (ref 5–10.5)
POTASSIUM REFLEX MAGNESIUM: 4.5 MMOL/L (ref 3.5–5.1)
RBC # BLD: 4.46 M/UL (ref 4–5.2)
SODIUM BLD-SCNC: 137 MMOL/L (ref 136–145)
T4 FREE: 1.2 NG/DL (ref 0.9–1.8)
TOTAL PROTEIN: 7.2 G/DL (ref 6.4–8.2)
TRIGL SERPL-MCNC: 101 MG/DL (ref 0–150)
TSH SERPL DL<=0.05 MIU/L-ACNC: 0.73 UIU/ML (ref 0.27–4.2)
VITAMIN B-12: 381 PG/ML (ref 211–911)
VLDLC SERPL CALC-MCNC: 20 MG/DL
WBC # BLD: 8.9 K/UL (ref 4–11)

## 2019-05-17 PROCEDURE — G8427 DOCREV CUR MEDS BY ELIG CLIN: HCPCS | Performed by: NURSE PRACTITIONER

## 2019-05-17 PROCEDURE — 1036F TOBACCO NON-USER: CPT | Performed by: NURSE PRACTITIONER

## 2019-05-17 PROCEDURE — 99213 OFFICE O/P EST LOW 20 MIN: CPT | Performed by: NURSE PRACTITIONER

## 2019-05-17 PROCEDURE — G8417 CALC BMI ABV UP PARAM F/U: HCPCS | Performed by: NURSE PRACTITIONER

## 2019-05-17 ASSESSMENT — ENCOUNTER SYMPTOMS
NAUSEA: 0
CONSTIPATION: 0
CHEST TIGHTNESS: 0
COUGH: 0
SHORTNESS OF BREATH: 0

## 2019-05-17 ASSESSMENT — PATIENT HEALTH QUESTIONNAIRE - PHQ9
SUM OF ALL RESPONSES TO PHQ9 QUESTIONS 1 & 2: 0
SUM OF ALL RESPONSES TO PHQ QUESTIONS 1-9: 0
1. LITTLE INTEREST OR PLEASURE IN DOING THINGS: 0
SUM OF ALL RESPONSES TO PHQ QUESTIONS 1-9: 0
2. FEELING DOWN, DEPRESSED OR HOPELESS: 0

## 2019-05-17 NOTE — PROGRESS NOTES
Subjective:      Patient ID: Lianet Crawford is a 34 y.o. female. HPI     Hx of chronic fatigue. Now sleeping 8-12 hours, increased fatigue past 2 weeks. After working couple of hours then ready for nap. Denies other symptoms. Has IUD, no longer having menses for the past 10 years. Review of Systems   Constitutional: Positive for fatigue. Negative for chills and fever. Respiratory: Negative for cough, chest tightness and shortness of breath. Cardiovascular: Negative for chest pain and palpitations. Gastrointestinal: Negative for constipation and nausea. Musculoskeletal: Negative for arthralgias. Neurological: Negative for dizziness and headaches. Psychiatric/Behavioral: Negative. Objective:   Physical Exam   Constitutional: She appears well-developed and well-nourished. No distress. HENT:   Head: Normocephalic and atraumatic. Neck: Normal range of motion. Neck supple. No thyromegaly present. Cardiovascular: Normal rate, regular rhythm, normal heart sounds and intact distal pulses. Pulmonary/Chest: Effort normal and breath sounds normal. No respiratory distress. Abdominal: Soft. Bowel sounds are normal. She exhibits no distension. There is no tenderness. Musculoskeletal: Normal range of motion. She exhibits no edema. Lymphadenopathy:     She has no cervical adenopathy. Neurological: She is alert. Skin: Skin is warm. No erythema. Psychiatric: She has a normal mood and affect. Her behavior is normal.       Assessment:      1. Chronic knee pain  2. Chronic fatigue      Plan:      1. Bertin was seen today for fatigue.     Diagnoses and all orders for this visit:    Chronic fatigue  -     Comprehensive Metabolic Panel w/ Reflex to MG  -     CBC Auto Differential  -     TSH without Reflex  -     T4, Free  -     Vitamin B12    Chronic pain of both knees  -     Janneth Morales MD, Orthopedic Surgery, [facility]    Lipid screening  -     Comprehensive Metabolic Panel w/ Reflex to MG  -     Lipid Panel              ROSELIA BUSH, APRN - CNP

## 2019-06-25 ENCOUNTER — PREP FOR PROCEDURE (OUTPATIENT)
Dept: SURGERY | Age: 39
End: 2019-06-25

## 2019-06-25 ENCOUNTER — OFFICE VISIT (OUTPATIENT)
Dept: SURGERY | Age: 39
End: 2019-06-25
Payer: MEDICAID

## 2019-06-25 VITALS
HEIGHT: 68 IN | WEIGHT: 237 LBS | BODY MASS INDEX: 35.92 KG/M2 | DIASTOLIC BLOOD PRESSURE: 66 MMHG | SYSTOLIC BLOOD PRESSURE: 102 MMHG

## 2019-06-25 DIAGNOSIS — L91.8 INFLAMED SKIN TAG: Primary | ICD-10-CM

## 2019-06-25 PROCEDURE — 99213 OFFICE O/P EST LOW 20 MIN: CPT | Performed by: SURGERY

## 2019-06-25 PROCEDURE — 1036F TOBACCO NON-USER: CPT | Performed by: SURGERY

## 2019-06-25 PROCEDURE — 11200 RMVL SKIN TAGS UP TO&INC 15: CPT | Performed by: SURGERY

## 2019-06-25 PROCEDURE — G8427 DOCREV CUR MEDS BY ELIG CLIN: HCPCS | Performed by: SURGERY

## 2019-06-25 PROCEDURE — G8417 CALC BMI ABV UP PARAM F/U: HCPCS | Performed by: SURGERY

## 2019-06-25 NOTE — PROGRESS NOTES
activity:     Days per week: Not on file     Minutes per session: Not on file    Stress: Not on file   Relationships    Social connections:     Talks on phone: Not on file     Gets together: Not on file     Attends Hoahaoism service: Not on file     Active member of club or organization: Not on file     Attends meetings of clubs or organizations: Not on file     Relationship status: Not on file    Intimate partner violence:     Fear of current or ex partner: Not on file     Emotionally abused: Not on file     Physically abused: Not on file     Forced sexual activity: Not on file   Other Topics Concern    Not on file   Social History Narrative    Not on file          Vitals:    06/25/19 1613   BP: 102/66   Site: Right Wrist   Position: Sitting   Cuff Size: Medium Adult   Weight: 237 lb (107.5 kg)   Height: 5' 7.52\" (1.715 m)     Body mass index is 36.55 kg/m². Wt Readings from Last 3 Encounters:   06/25/19 237 lb (107.5 kg)   05/17/19 239 lb (108.4 kg)   02/05/19 240 lb (108.9 kg)     BP Readings from Last 3 Encounters:   06/25/19 102/66   05/17/19 116/70   02/05/19 126/86          REVIEW OF SYSTEMS:   · All other systems reviewed; please refer to HPI with pertinent positives, all other ROS are negative    PHYSICAL EXAM:    CONSTITUTIONAL:  awake, alert, no apparent distress and moderately obese  ENT:  normocepalic, without obvious abnormality  NECK:  supple, symmetrical, trachea midline   LUNGS:  Resp easy and unlabored  CARDIOVASCULAR:     ABDOMEN: soft, non-distended,  MUSCULOSKELETAL: No edema  NEUROLOGIC:  Mental Status Exam:  Level of Alertness:   awake  Orientation:   person, place, time    L back:  Old skin tag site w/ a new pedunculated process extending out from the base of the old site, tender to palpation. Kelsey Porter DATA:       ASSESSMENT:     Diagnosis Orders   1.  Inflamed skin tag  06778 - [place] REMOVAL OF SKIN TAGS, UP TO 15     I have suggested that we excise the protruding, pedunculated portion of the tag out here today in the office. PLAN:    Under local anesthesia, the pedunculated tag was sharply excised without difficulty. We will schedule the pt for full excision of the original tag site under local anesthesia. The technical aspects, risks, benefits and complications of the procedure were discussed with the patient. The pt appears to understand, asks appropriate questions, and agrees to proceed with the procedure.        ZAKIYA Dafiti NEGRO

## 2019-06-26 ENCOUNTER — TELEPHONE (OUTPATIENT)
Dept: SURGERY | Age: 39
End: 2019-06-26

## 2019-06-27 ENCOUNTER — TELEPHONE (OUTPATIENT)
Dept: SURGERY | Age: 39
End: 2019-06-27

## 2019-07-01 ENCOUNTER — TELEPHONE (OUTPATIENT)
Dept: SURGERY | Age: 39
End: 2019-07-01

## 2019-07-08 NOTE — PROGRESS NOTES
Paty Lusher    Age 34 y.o.    female    1980    MRN [id number]    Date___________   Arrival Time_____________  OR Time____________Duration____     Procedure(s):  WIDE LOCAL EXCISION BACK LESION    Surgeon  ________________________________  [facility]   General   [place]       Phone [phone] (home)       [address]  Cell Work  ______________________________________________________________________________________________________________________________________________________________________________________________________________________________________________________________________________________________________________________________________________________________    PCP__________________________Phone__________________________________      H&P__________________Bringing      Chart              Epic    DOS           Called_______  EKG__________________Bringing      Chart              Epic    DOS           Called_______  LAB__________________ Bringing      Chart              Epic    DOS           Called_______  CardiacClearance _______Bringing      Chart              Epic    DOS           Called_______      Cardiologist________________________ Phone___________________________      ? Alevism concerns / Waiver on Chart            PAT Communications________________  ? Pre-op Instructions Given [place]          _________________________________  ? Directions to Surgery Center                          _________________________________  ? Transportation Home_______________      _________________________________  ?  Crutches/Walker__________________        _________________________________      ________Pre-op Orders   _______Transcribed    _______Wt.  ________Pharmacy          _______SCD  ______VTE     ______Beta Blocker  ________Consent             ________KLEVER Gomez

## 2019-07-16 ENCOUNTER — HOSPITAL ENCOUNTER (OUTPATIENT)
Age: 39
Setting detail: OUTPATIENT SURGERY
Discharge: HOME | End: 2019-07-16
Attending: SURGERY | Admitting: SURGERY
Payer: MEDICAID

## 2019-07-16 VITALS
RESPIRATION RATE: 18 BRPM | WEIGHT: 245 LBS | DIASTOLIC BLOOD PRESSURE: 57 MMHG | TEMPERATURE: 97.8 F | HEART RATE: 65 BPM | SYSTOLIC BLOOD PRESSURE: 148 MMHG | HEIGHT: 68 IN | OXYGEN SATURATION: 96 % | BODY MASS INDEX: 37.13 KG/M2

## 2019-07-16 DIAGNOSIS — L91.8 INFLAMED SKIN TAG: ICD-10-CM

## 2019-07-16 PROCEDURE — 2500000003 HC RX 250 WO HCPCS: Performed by: SURGERY

## 2019-07-16 PROCEDURE — 3600000014 HC SURGERY LEVEL 4 ADDTL 15MIN: Performed by: SURGERY

## 2019-07-16 PROCEDURE — 88305 TISSUE EXAM BY PATHOLOGIST: CPT

## 2019-07-16 PROCEDURE — 7100000010 HC PHASE II RECOVERY - FIRST 15 MIN: Performed by: SURGERY

## 2019-07-16 PROCEDURE — 2709999900 HC NON-CHARGEABLE SUPPLY: Performed by: SURGERY

## 2019-07-16 PROCEDURE — 11401 EXC TR-EXT B9+MARG 0.6-1 CM: CPT | Performed by: SURGERY

## 2019-07-16 PROCEDURE — 3600000004 HC SURGERY LEVEL 4 BASE: Performed by: SURGERY

## 2019-07-16 RX ORDER — ACETAMINOPHEN 325 MG/1
650 TABLET ORAL EVERY 6 HOURS PRN
COMMUNITY
End: 2022-07-11

## 2019-07-16 ASSESSMENT — PAIN - FUNCTIONAL ASSESSMENT: PAIN_FUNCTIONAL_ASSESSMENT: 0-10

## 2019-07-16 ASSESSMENT — PAIN SCALES - GENERAL: PAINLEVEL_OUTOF10: 0

## 2019-10-18 ENCOUNTER — OFFICE VISIT (OUTPATIENT)
Dept: INTERNAL MEDICINE CLINIC | Age: 39
End: 2019-10-18
Payer: MEDICAID

## 2019-10-18 VITALS
HEART RATE: 93 BPM | BODY MASS INDEX: 38.11 KG/M2 | OXYGEN SATURATION: 98 % | SYSTOLIC BLOOD PRESSURE: 138 MMHG | WEIGHT: 247 LBS | DIASTOLIC BLOOD PRESSURE: 84 MMHG

## 2019-10-18 DIAGNOSIS — F41.9 ANXIETY: ICD-10-CM

## 2019-10-18 PROCEDURE — 99213 OFFICE O/P EST LOW 20 MIN: CPT | Performed by: NURSE PRACTITIONER

## 2019-10-18 PROCEDURE — 1036F TOBACCO NON-USER: CPT | Performed by: NURSE PRACTITIONER

## 2019-10-18 PROCEDURE — G8417 CALC BMI ABV UP PARAM F/U: HCPCS | Performed by: NURSE PRACTITIONER

## 2019-10-18 PROCEDURE — G8484 FLU IMMUNIZE NO ADMIN: HCPCS | Performed by: NURSE PRACTITIONER

## 2019-10-18 PROCEDURE — G8427 DOCREV CUR MEDS BY ELIG CLIN: HCPCS | Performed by: NURSE PRACTITIONER

## 2019-10-18 RX ORDER — ESCITALOPRAM OXALATE 10 MG/1
10 TABLET ORAL DAILY
Qty: 30 TABLET | Refills: 2 | Status: SHIPPED | OUTPATIENT
Start: 2019-10-18 | End: 2020-04-30 | Stop reason: SDUPTHER

## 2019-10-18 RX ORDER — HYDROXYZINE HYDROCHLORIDE 25 MG/1
25 TABLET, FILM COATED ORAL EVERY 8 HOURS PRN
Qty: 30 TABLET | Refills: 2 | Status: SHIPPED | OUTPATIENT
Start: 2019-10-18 | End: 2019-11-17

## 2019-10-18 ASSESSMENT — PATIENT HEALTH QUESTIONNAIRE - PHQ9
SUM OF ALL RESPONSES TO PHQ QUESTIONS 1-9: 6
2. FEELING DOWN, DEPRESSED OR HOPELESS: 3
SUM OF ALL RESPONSES TO PHQ QUESTIONS 1-9: 6
SUM OF ALL RESPONSES TO PHQ9 QUESTIONS 1 & 2: 6
1. LITTLE INTEREST OR PLEASURE IN DOING THINGS: 3

## 2019-10-18 ASSESSMENT — ENCOUNTER SYMPTOMS
ABDOMINAL PAIN: 0
SINUS PAIN: 0
COLOR CHANGE: 0
SINUS PRESSURE: 0
CONSTIPATION: 0
SHORTNESS OF BREATH: 0
BACK PAIN: 0
DIARRHEA: 0
COUGH: 0
WHEEZING: 0

## 2020-02-17 ENCOUNTER — PREP FOR PROCEDURE (OUTPATIENT)
Dept: OBGYN | Age: 40
End: 2020-02-17

## 2020-02-17 RX ORDER — SODIUM CHLORIDE 0.9 % (FLUSH) 0.9 %
10 SYRINGE (ML) INJECTION PRN
Status: CANCELLED | OUTPATIENT
Start: 2020-02-17

## 2020-02-17 RX ORDER — SODIUM CHLORIDE 0.9 % (FLUSH) 0.9 %
10 SYRINGE (ML) INJECTION EVERY 12 HOURS SCHEDULED
Status: CANCELLED | OUTPATIENT
Start: 2020-02-17

## 2020-02-17 NOTE — H&P (VIEW-ONLY)
Description Start Date Stop Date Refilled Rx Elsewhere   Mirena  //   Y   Lexapro 10 mg tablet take 1 tablet by oral route  every day //   Y     Medication Reconciliation  Medications reconciled today. Medication Reviewed  Adherence Medication Name Sig Desc Elsewhere Status   taking as directed [de-identified]  Y Verified   taking as directed Lexapro 10 mg tablet take 1 tablet by oral route  every day Y Verified     Allergies:  Ingredient Reaction (Severity) Medication Name Comment   NO KNOWN ALLERGIES          Family History  (Detailed)  Relationship Family Member Name  Age at Death Condition Onset Age Cause of Death       Family history of Hypertension  N       Family history of Diabetes mellitus  N       Family history of Hyperlipidemia  N       Family history of Migraines  N       Family history of Alcoholism  N       Family history of Depression  N     Social History:  (Detailed)  Tobacco use reviewed. The patient is right-handed. Preferred language is [place]. EDUCATION/EMPLOYMENT/OCCUPATION  Employment History Status Retired [employer]         MARITAL STATUS/FAMILY/SOCIAL SUPPORT  Marital status: Single   CHILDREN  Has children:  4 daughter(s). dad, brother, mom friends  Tobacco use status: Current non-smoker. Smoking status: Never smoker. SMOKING STATUS  Type Smoking Status Usage Per Day Years Used Pack Years Total Pack Years    Never smoker         VAPING USE  Screened for vaping? Yes  Status: Not a current user    TOBACCO/VAPING EXPOSURE  No passive smoke exposure. ALCOHOL  There is a history of alcohol use. Type: Beer. 1 beer consumed weekly. CAFFEINE  The patient uses caffeine: coffee and soda. - 1 cup (not daily) a day. [address] member. Exercise includes walking. Never exercises. DIET  Fair. The patient reports there are animals in the home. PETS   cats. The patient cleans up after the animal(s).     SLEEP PATTERNS  Patient has no changes to sleep patterns. Congregation/SPIRITUAL  The patient does not have a Taoism affiliation.  EXPERIENCE  Patient has no  experience. Review of Systems  System Neg/Pos Details   Constitutional Negative Chills/rigors, Fever and Generalized weakness. ENMT Negative Dysphagia and Epistaxis. Eyes Negative Burning eyes, Eye discharge, Eye redness and Vision changes. Respiratory Negative Dyspnea, Hemoptysis and Wheezing. Cardio Negative Chest pain and Irregular heartbeat/palpitations. GI Negative Abdominal pain, Change in bowel habits and Nausea.  Negative Change in urine color, Dysuria and Hematuria. Endocrine Negative Change in sleep/awake pattern. Neuro Negative Aphasia, Dizziness and Gait disturbance. Integumentary Negative Photosensitivity and Rash. MS Negative Bone/joint symptoms, Muscle weakness and Numbness in extremity. Hema/Lymph Negative Easy bleeding and Lymphadenopathy. Reproductive Positive Menarche age (Menarche age was 13), The patient is pre-menopausal.   Reproductive Negative Breast discharge, Breast lumps and Breast pain. Vital Signs     Time BP mm/Hg Pulse /min Resp /min Temp F Ht ft Ht in Ht cm Wt lb Wt kg BMI kg/m2 BSA m2 O2 Sat%   10:37 /70 92 14 98.8 5.0 7.50 171.45 239.40 108.590 36.94 2.27 97     Measured By  Time Measured by   10:37 AM Kemal Torrez MA     Physical Exam  Exam Findings Details   Constitutional Normal Well developed. Respiratory Normal Auscultation - Normal.   Cardiovascular Normal Regular rhythm. No murmurs, gallops, or rubs. Abdomen Normal Anterior palpation -  No rebound. No abdominal tenderness. No hernia. Skin Normal Inspection - Normal.   Psychiatric Normal Oriented to time, place, person and situation. Appropriate mood and affect.        Completed Orders (This Visit)  Order Details Reason Side Interpretation Result Initial Treatment Date Region   CAGE-AID Alcohol and Drug Screening    None 0     Patient Health Questionnaire (PHQ-2)    1 - Further testing indicated 1     Patient Health Questionnaire (PHQ-9)    Mild depression 5     SDOH    Intervention needed 2     SDOH    Intervention needed 2       Assessment/Plan  # Detail Type Description    1. Assessment Right ovarian cyst (N83.201). Impression With size of cyst suspect may not resolve spontaneously but possible. Discussed expectant management vs removal with laparoscopic cystectomy and pt desires removal due to symptoms. Risks of bleeding, infection, damage to surrounding organs, need for oophorectomy, DVT, ileus reviewed. Questions answered and postop instructions reviewed. .         2. Assessment Body mass index (BMI) 36.0-36.9, adult (Z68.36).                   Medications (Added, Continued or Stopped today):  Start Date Medication Directions PRN Status PRN Reason Instruction Stop Date    Lexapro 10 mg tablet take 1 tablet by oral route  every day N       Mirena  N            Active Patient Care Team Members    Name Contact Agency Type Support Role Relationship Active Date Inactive Date Specialty   Tamar Savage [facility]   Patient provider PCP   Nurse Practitioner NP       Provider:   Milo Burkitt MD, Roxana Montelongo  02/17/2020 10:59 AM   Document generated by:  Doug Quinones 02/17/2020 10:58 AM  [facility]  [address] [place] [address]   Phone: [phone]  Fax: [phone]      Electronically signed by Doug Quinones MD on 02/17/2020 10:59 AM

## 2020-02-18 ENCOUNTER — ANESTHESIA EVENT (OUTPATIENT)
Dept: OPERATING ROOM | Age: 40
End: 2020-02-18
Payer: COMMERCIAL

## 2020-02-19 ENCOUNTER — HOSPITAL ENCOUNTER (OUTPATIENT)
Age: 40
Setting detail: OUTPATIENT SURGERY
Discharge: HOME | End: 2020-02-19
Attending: OBSTETRICS & GYNECOLOGY | Admitting: OBSTETRICS & GYNECOLOGY
Payer: COMMERCIAL

## 2020-02-19 ENCOUNTER — ANESTHESIA (OUTPATIENT)
Dept: OPERATING ROOM | Age: 40
End: 2020-02-19
Payer: COMMERCIAL

## 2020-02-19 VITALS
TEMPERATURE: 97.7 F | HEIGHT: 68 IN | RESPIRATION RATE: 13 BRPM | SYSTOLIC BLOOD PRESSURE: 118 MMHG | BODY MASS INDEX: 36.22 KG/M2 | OXYGEN SATURATION: 97 % | HEART RATE: 90 BPM | DIASTOLIC BLOOD PRESSURE: 75 MMHG | WEIGHT: 239 LBS

## 2020-02-19 VITALS
OXYGEN SATURATION: 97 % | RESPIRATION RATE: 2 BRPM | DIASTOLIC BLOOD PRESSURE: 68 MMHG | TEMPERATURE: 98.2 F | SYSTOLIC BLOOD PRESSURE: 135 MMHG

## 2020-02-19 LAB
HCT VFR BLD CALC: 38.9 % (ref 36–48)
HEMOGLOBIN: 13.3 G/DL (ref 12–16)
MCH RBC QN AUTO: 29.5 PG (ref 26–34)
MCHC RBC AUTO-ENTMCNC: 34.2 G/DL (ref 31–36)
MCV RBC AUTO: 86.2 FL (ref 80–100)
PDW BLD-RTO: 13 % (ref 12.4–15.4)
PLATELET # BLD: 353 K/UL (ref 135–450)
PMV BLD AUTO: 7.9 FL (ref 5–10.5)
PREGNANCY, URINE: NEGATIVE
RBC # BLD: 4.52 M/UL (ref 4–5.2)
WBC # BLD: 11.9 K/UL (ref 4–11)

## 2020-02-19 PROCEDURE — 2709999900 HC NON-CHARGEABLE SUPPLY: Performed by: OBSTETRICS & GYNECOLOGY

## 2020-02-19 PROCEDURE — 2580000003 HC RX 258: Performed by: ANESTHESIOLOGY

## 2020-02-19 PROCEDURE — 2580000003 HC RX 258: Performed by: OBSTETRICS & GYNECOLOGY

## 2020-02-19 PROCEDURE — 88307 TISSUE EXAM BY PATHOLOGIST: CPT

## 2020-02-19 PROCEDURE — 2500000003 HC RX 250 WO HCPCS: Performed by: ANESTHESIOLOGY

## 2020-02-19 PROCEDURE — 88305 TISSUE EXAM BY PATHOLOGIST: CPT

## 2020-02-19 PROCEDURE — 7100000011 HC PHASE II RECOVERY - ADDTL 15 MIN: Performed by: OBSTETRICS & GYNECOLOGY

## 2020-02-19 PROCEDURE — 6370000000 HC RX 637 (ALT 250 FOR IP): Performed by: NURSE ANESTHETIST, CERTIFIED REGISTERED

## 2020-02-19 PROCEDURE — 3700000000 HC ANESTHESIA ATTENDED CARE: Performed by: OBSTETRICS & GYNECOLOGY

## 2020-02-19 PROCEDURE — 6360000002 HC RX W HCPCS: Performed by: ANESTHESIOLOGY

## 2020-02-19 PROCEDURE — 6370000000 HC RX 637 (ALT 250 FOR IP): Performed by: ANESTHESIOLOGY

## 2020-02-19 PROCEDURE — 6360000002 HC RX W HCPCS: Performed by: NURSE ANESTHETIST, CERTIFIED REGISTERED

## 2020-02-19 PROCEDURE — 3700000001 HC ADD 15 MINUTES (ANESTHESIA): Performed by: OBSTETRICS & GYNECOLOGY

## 2020-02-19 PROCEDURE — 2500000003 HC RX 250 WO HCPCS: Performed by: NURSE ANESTHETIST, CERTIFIED REGISTERED

## 2020-02-19 PROCEDURE — 2720000010 HC SURG SUPPLY STERILE: Performed by: OBSTETRICS & GYNECOLOGY

## 2020-02-19 PROCEDURE — 7100000000 HC PACU RECOVERY - FIRST 15 MIN: Performed by: OBSTETRICS & GYNECOLOGY

## 2020-02-19 PROCEDURE — 3600000014 HC SURGERY LEVEL 4 ADDTL 15MIN: Performed by: OBSTETRICS & GYNECOLOGY

## 2020-02-19 PROCEDURE — 3600000004 HC SURGERY LEVEL 4 BASE: Performed by: OBSTETRICS & GYNECOLOGY

## 2020-02-19 PROCEDURE — 7100000010 HC PHASE II RECOVERY - FIRST 15 MIN: Performed by: OBSTETRICS & GYNECOLOGY

## 2020-02-19 PROCEDURE — 36415 COLL VENOUS BLD VENIPUNCTURE: CPT

## 2020-02-19 PROCEDURE — 84703 CHORIONIC GONADOTROPIN ASSAY: CPT

## 2020-02-19 PROCEDURE — 85027 COMPLETE CBC AUTOMATED: CPT

## 2020-02-19 PROCEDURE — 7100000001 HC PACU RECOVERY - ADDTL 15 MIN: Performed by: OBSTETRICS & GYNECOLOGY

## 2020-02-19 RX ORDER — FENTANYL CITRATE 50 UG/ML
INJECTION, SOLUTION INTRAMUSCULAR; INTRAVENOUS PRN
Status: DISCONTINUED | OUTPATIENT
Start: 2020-02-19 | End: 2020-02-19 | Stop reason: SDUPTHER

## 2020-02-19 RX ORDER — LIDOCAINE HYDROCHLORIDE 20 MG/ML
INJECTION, SOLUTION INFILTRATION; PERINEURAL PRN
Status: DISCONTINUED | OUTPATIENT
Start: 2020-02-19 | End: 2020-02-19 | Stop reason: SDUPTHER

## 2020-02-19 RX ORDER — MORPHINE SULFATE 10 MG/ML
1 INJECTION, SOLUTION INTRAMUSCULAR; INTRAVENOUS EVERY 5 MIN PRN
Status: DISCONTINUED | OUTPATIENT
Start: 2020-02-19 | End: 2020-02-19 | Stop reason: HOSPADM

## 2020-02-19 RX ORDER — SODIUM CHLORIDE 0.9 % (FLUSH) 0.9 %
10 SYRINGE (ML) INJECTION PRN
Status: DISCONTINUED | OUTPATIENT
Start: 2020-02-19 | End: 2020-02-19 | Stop reason: HOSPADM

## 2020-02-19 RX ORDER — SODIUM CHLORIDE 0.9 % (FLUSH) 0.9 %
10 SYRINGE (ML) INJECTION EVERY 12 HOURS SCHEDULED
Status: DISCONTINUED | OUTPATIENT
Start: 2020-02-19 | End: 2020-02-19 | Stop reason: HOSPADM

## 2020-02-19 RX ORDER — ONDANSETRON 2 MG/ML
INJECTION INTRAMUSCULAR; INTRAVENOUS PRN
Status: DISCONTINUED | OUTPATIENT
Start: 2020-02-19 | End: 2020-02-19 | Stop reason: SDUPTHER

## 2020-02-19 RX ORDER — MORPHINE SULFATE 10 MG/ML
2 INJECTION, SOLUTION INTRAMUSCULAR; INTRAVENOUS EVERY 5 MIN PRN
Status: DISCONTINUED | OUTPATIENT
Start: 2020-02-19 | End: 2020-02-19 | Stop reason: HOSPADM

## 2020-02-19 RX ORDER — SODIUM CHLORIDE, SODIUM LACTATE, POTASSIUM CHLORIDE, CALCIUM CHLORIDE 600; 310; 30; 20 MG/100ML; MG/100ML; MG/100ML; MG/100ML
INJECTION, SOLUTION INTRAVENOUS CONTINUOUS
Status: DISCONTINUED | OUTPATIENT
Start: 2020-02-19 | End: 2020-02-19 | Stop reason: HOSPADM

## 2020-02-19 RX ORDER — PROPOFOL 10 MG/ML
INJECTION, EMULSION INTRAVENOUS PRN
Status: DISCONTINUED | OUTPATIENT
Start: 2020-02-19 | End: 2020-02-19 | Stop reason: SDUPTHER

## 2020-02-19 RX ORDER — ROCURONIUM BROMIDE 10 MG/ML
INJECTION, SOLUTION INTRAVENOUS PRN
Status: DISCONTINUED | OUTPATIENT
Start: 2020-02-19 | End: 2020-02-19 | Stop reason: SDUPTHER

## 2020-02-19 RX ORDER — MAGNESIUM HYDROXIDE 1200 MG/15ML
LIQUID ORAL CONTINUOUS PRN
Status: COMPLETED | OUTPATIENT
Start: 2020-02-19 | End: 2020-02-19

## 2020-02-19 RX ORDER — HYDROCODONE BITARTRATE AND ACETAMINOPHEN 5; 325 MG/1; MG/1
1 TABLET ORAL EVERY 6 HOURS PRN
Qty: 28 TABLET | Refills: 0 | Status: SHIPPED | OUTPATIENT
Start: 2020-02-19 | End: 2020-02-26

## 2020-02-19 RX ORDER — LABETALOL HYDROCHLORIDE 5 MG/ML
5 INJECTION, SOLUTION INTRAVENOUS EVERY 10 MIN PRN
Status: DISCONTINUED | OUTPATIENT
Start: 2020-02-19 | End: 2020-02-19 | Stop reason: HOSPADM

## 2020-02-19 RX ORDER — PROMETHAZINE HYDROCHLORIDE 25 MG/ML
6.25 INJECTION, SOLUTION INTRAMUSCULAR; INTRAVENOUS
Status: DISCONTINUED | OUTPATIENT
Start: 2020-02-19 | End: 2020-02-19 | Stop reason: HOSPADM

## 2020-02-19 RX ORDER — SODIUM CHLORIDE, SODIUM LACTATE, POTASSIUM CHLORIDE, AND CALCIUM CHLORIDE .6; .31; .03; .02 G/100ML; G/100ML; G/100ML; G/100ML
IRRIGANT IRRIGATION PRN
Status: DISCONTINUED | OUTPATIENT
Start: 2020-02-19 | End: 2020-02-19 | Stop reason: ALTCHOICE

## 2020-02-19 RX ORDER — OXYCODONE HYDROCHLORIDE AND ACETAMINOPHEN 5; 325 MG/1; MG/1
1 TABLET ORAL PRN
Status: DISCONTINUED | OUTPATIENT
Start: 2020-02-19 | End: 2020-02-19 | Stop reason: HOSPADM

## 2020-02-19 RX ORDER — LIDOCAINE HYDROCHLORIDE 10 MG/ML
0.3 INJECTION, SOLUTION EPIDURAL; INFILTRATION; INTRACAUDAL; PERINEURAL
Status: COMPLETED | OUTPATIENT
Start: 2020-02-19 | End: 2020-02-19

## 2020-02-19 RX ORDER — DIPHENHYDRAMINE HYDROCHLORIDE 50 MG/ML
12.5 INJECTION INTRAMUSCULAR; INTRAVENOUS
Status: DISCONTINUED | OUTPATIENT
Start: 2020-02-19 | End: 2020-02-19 | Stop reason: HOSPADM

## 2020-02-19 RX ORDER — MIDAZOLAM HYDROCHLORIDE 1 MG/ML
INJECTION INTRAMUSCULAR; INTRAVENOUS PRN
Status: DISCONTINUED | OUTPATIENT
Start: 2020-02-19 | End: 2020-02-19 | Stop reason: SDUPTHER

## 2020-02-19 RX ORDER — ONDANSETRON 2 MG/ML
4 INJECTION INTRAMUSCULAR; INTRAVENOUS
Status: DISCONTINUED | OUTPATIENT
Start: 2020-02-19 | End: 2020-02-19 | Stop reason: HOSPADM

## 2020-02-19 RX ORDER — HYDROCODONE BITARTRATE AND ACETAMINOPHEN 5; 325 MG/1; MG/1
TABLET ORAL
Status: DISCONTINUED
Start: 2020-02-19 | End: 2020-02-19 | Stop reason: HOSPADM

## 2020-02-19 RX ORDER — IBUPROFEN 200 MG
200 TABLET ORAL EVERY 6 HOURS PRN
COMMUNITY
End: 2020-07-08

## 2020-02-19 RX ORDER — HYDRALAZINE HYDROCHLORIDE 20 MG/ML
5 INJECTION INTRAMUSCULAR; INTRAVENOUS EVERY 10 MIN PRN
Status: DISCONTINUED | OUTPATIENT
Start: 2020-02-19 | End: 2020-02-19 | Stop reason: HOSPADM

## 2020-02-19 RX ORDER — ALBUTEROL SULFATE 90 UG/1
AEROSOL, METERED RESPIRATORY (INHALATION) PRN
Status: DISCONTINUED | OUTPATIENT
Start: 2020-02-19 | End: 2020-02-19 | Stop reason: SDUPTHER

## 2020-02-19 RX ORDER — HYDROCODONE BITARTRATE AND ACETAMINOPHEN 5; 325 MG/1; MG/1
1 TABLET ORAL ONCE
Status: COMPLETED | OUTPATIENT
Start: 2020-02-19 | End: 2020-02-19

## 2020-02-19 RX ORDER — DEXAMETHASONE SODIUM PHOSPHATE 4 MG/ML
INJECTION, SOLUTION INTRA-ARTICULAR; INTRALESIONAL; INTRAMUSCULAR; INTRAVENOUS; SOFT TISSUE PRN
Status: DISCONTINUED | OUTPATIENT
Start: 2020-02-19 | End: 2020-02-19 | Stop reason: SDUPTHER

## 2020-02-19 RX ORDER — OXYCODONE HYDROCHLORIDE AND ACETAMINOPHEN 5; 325 MG/1; MG/1
2 TABLET ORAL PRN
Status: DISCONTINUED | OUTPATIENT
Start: 2020-02-19 | End: 2020-02-19 | Stop reason: HOSPADM

## 2020-02-19 RX ADMIN — FENTANYL CITRATE 50 MCG: 50 INJECTION INTRAMUSCULAR; INTRAVENOUS at 09:33

## 2020-02-19 RX ADMIN — Medication 2 PUFF: at 09:34

## 2020-02-19 RX ADMIN — ROCURONIUM BROMIDE 50 MG: 10 INJECTION, SOLUTION INTRAVENOUS at 07:51

## 2020-02-19 RX ADMIN — PROPOFOL 200 MG: 10 INJECTION, EMULSION INTRAVENOUS at 07:51

## 2020-02-19 RX ADMIN — ROCURONIUM BROMIDE 10 MG: 10 INJECTION, SOLUTION INTRAVENOUS at 08:40

## 2020-02-19 RX ADMIN — FENTANYL CITRATE 50 MCG: 50 INJECTION INTRAMUSCULAR; INTRAVENOUS at 09:03

## 2020-02-19 RX ADMIN — SODIUM CHLORIDE, POTASSIUM CHLORIDE, SODIUM LACTATE AND CALCIUM CHLORIDE: 600; 310; 30; 20 INJECTION, SOLUTION INTRAVENOUS at 07:16

## 2020-02-19 RX ADMIN — HYDROCODONE BITARTRATE AND ACETAMINOPHEN 1 TABLET: 5; 325 TABLET ORAL at 11:17

## 2020-02-19 RX ADMIN — FENTANYL CITRATE 100 MCG: 50 INJECTION INTRAMUSCULAR; INTRAVENOUS at 07:51

## 2020-02-19 RX ADMIN — LIDOCAINE HYDROCHLORIDE 80 MG: 20 INJECTION, SOLUTION INFILTRATION; PERINEURAL at 07:51

## 2020-02-19 RX ADMIN — LIDOCAINE HYDROCHLORIDE 0.3 ML: 10 INJECTION, SOLUTION EPIDURAL; INFILTRATION; INTRACAUDAL; PERINEURAL at 07:16

## 2020-02-19 RX ADMIN — DEXAMETHASONE SODIUM PHOSPHATE 8 MG: 4 INJECTION, SOLUTION INTRAMUSCULAR; INTRAVENOUS at 07:56

## 2020-02-19 RX ADMIN — SODIUM CHLORIDE, POTASSIUM CHLORIDE, SODIUM LACTATE AND CALCIUM CHLORIDE: 600; 310; 30; 20 INJECTION, SOLUTION INTRAVENOUS at 09:00

## 2020-02-19 RX ADMIN — ONDANSETRON 4 MG: 2 INJECTION, SOLUTION INTRAMUSCULAR; INTRAVENOUS at 07:56

## 2020-02-19 RX ADMIN — SUGAMMADEX 200 MG: 100 INJECTION, SOLUTION INTRAVENOUS at 09:27

## 2020-02-19 RX ADMIN — HYDROMORPHONE HYDROCHLORIDE 0.5 MG: 1 INJECTION, SOLUTION INTRAMUSCULAR; INTRAVENOUS; SUBCUTANEOUS at 10:22

## 2020-02-19 RX ADMIN — MIDAZOLAM 2 MG: 1 INJECTION INTRAMUSCULAR; INTRAVENOUS at 07:43

## 2020-02-19 ASSESSMENT — PULMONARY FUNCTION TESTS
PIF_VALUE: 28
PIF_VALUE: 30
PIF_VALUE: 40
PIF_VALUE: 7
PIF_VALUE: 40
PIF_VALUE: 2
PIF_VALUE: 28
PIF_VALUE: 41
PIF_VALUE: 40
PIF_VALUE: 1
PIF_VALUE: 40
PIF_VALUE: 41
PIF_VALUE: 36
PIF_VALUE: 3
PIF_VALUE: 29
PIF_VALUE: 0
PIF_VALUE: 40
PIF_VALUE: 41
PIF_VALUE: 0
PIF_VALUE: 24
PIF_VALUE: 29
PIF_VALUE: 40
PIF_VALUE: 27
PIF_VALUE: 29
PIF_VALUE: 40
PIF_VALUE: 41
PIF_VALUE: 42
PIF_VALUE: 40
PIF_VALUE: 43
PIF_VALUE: 40
PIF_VALUE: 27
PIF_VALUE: 40
PIF_VALUE: 3
PIF_VALUE: 8
PIF_VALUE: 40
PIF_VALUE: 41
PIF_VALUE: 40
PIF_VALUE: 41
PIF_VALUE: 33
PIF_VALUE: 40
PIF_VALUE: 41
PIF_VALUE: 40
PIF_VALUE: 40
PIF_VALUE: 33
PIF_VALUE: 40
PIF_VALUE: 40
PIF_VALUE: 2
PIF_VALUE: 35
PIF_VALUE: 40
PIF_VALUE: 27
PIF_VALUE: 41
PIF_VALUE: 40
PIF_VALUE: 41
PIF_VALUE: 34
PIF_VALUE: 40
PIF_VALUE: 28
PIF_VALUE: 28
PIF_VALUE: 40
PIF_VALUE: 28
PIF_VALUE: 28
PIF_VALUE: 31
PIF_VALUE: 41
PIF_VALUE: 40
PIF_VALUE: 29
PIF_VALUE: 40
PIF_VALUE: 40
PIF_VALUE: 41
PIF_VALUE: 40
PIF_VALUE: 7
PIF_VALUE: 41
PIF_VALUE: 26
PIF_VALUE: 40
PIF_VALUE: 29
PIF_VALUE: 40
PIF_VALUE: 40
PIF_VALUE: 5
PIF_VALUE: 40
PIF_VALUE: 41
PIF_VALUE: 35
PIF_VALUE: 6
PIF_VALUE: 40
PIF_VALUE: 1
PIF_VALUE: 41
PIF_VALUE: 40
PIF_VALUE: 4
PIF_VALUE: 40
PIF_VALUE: 36
PIF_VALUE: 40
PIF_VALUE: 40
PIF_VALUE: 0
PIF_VALUE: 28
PIF_VALUE: 2
PIF_VALUE: 40
PIF_VALUE: 27
PIF_VALUE: 41
PIF_VALUE: 40
PIF_VALUE: 28
PIF_VALUE: 41
PIF_VALUE: 41
PIF_VALUE: 40
PIF_VALUE: 46

## 2020-02-19 ASSESSMENT — PAIN DESCRIPTION - LOCATION: LOCATION: ABDOMEN

## 2020-02-19 ASSESSMENT — PAIN SCALES - GENERAL
PAINLEVEL_OUTOF10: 6
PAINLEVEL_OUTOF10: 6
PAINLEVEL_OUTOF10: 7

## 2020-02-19 ASSESSMENT — PAIN DESCRIPTION - PAIN TYPE: TYPE: SURGICAL PAIN

## 2020-02-19 ASSESSMENT — PAIN - FUNCTIONAL ASSESSMENT
PAIN_FUNCTIONAL_ASSESSMENT: 0-10
PAIN_FUNCTIONAL_ASSESSMENT: 0-10

## 2020-02-19 ASSESSMENT — PAIN DESCRIPTION - DESCRIPTORS: DESCRIPTORS: CRAMPING

## 2020-02-19 NOTE — PROGRESS NOTES
Arrived from OR awakens easily and respirations unlabored. Abdomen soft and three belly wounds with surgical glue noted. No drainage. Report received from Isac Burch RN. No c/o pain at this time.  Ct stapletoner applied and ice pack to abdomen

## 2020-02-19 NOTE — ANESTHESIA POSTPROCEDURE EVALUATION
Department of Anesthesiology  Postprocedure Note    Patient: Karri Rubio  MRN: [id number]  YOB: 1980  Date of evaluation: 2/19/2020  Time:  11:32 AM     Procedure Summary     Date:  02/19/20 Room / Location:  [address] [place] [address] / [facility]    Anesthesia Start:  0024 Anesthesia Stop:  8165    Procedure:  LAPAROSCOPIC BILATERAL OVARIAN CYSTECTOMY (Bilateral ) Diagnosis:  (OVARIAN CYST RIGHT SIDE UNSPECIFIED)    Surgeon:  Gabriela Bui MD Responsible Provider:  Heather Lea MD    Anesthesia Type:  general ASA Status:  2          Anesthesia Type: general    Suzy Phase I: Suzy Score: 10    Suzy Phase II: Suzy Score: 9    Last vitals: Reviewed and per EMR flowsheets. Anesthesia Post Evaluation    Patient location during evaluation: PACU  Patient participation: complete - patient participated  Level of consciousness: awake and alert  Airway patency: patent  Nausea & Vomiting: no nausea and no vomiting  Complications: no  Cardiovascular status: blood pressure returned to baseline  Respiratory status: acceptable  Hydration status: euvolemic  Comments: VSS on transfer to phase 2 recovery. No anesthetic complications.

## 2020-02-19 NOTE — BRIEF OP NOTE
Department of Gynecology  Brief Operative Report           Pre-operative Diagnosis:  Ovarian cysts     Post-operative Diagnosis:  Left simple ovarian cyst, Right ovarian teratoma and simple cyst     Procedure:  Laparoscopic bilateral ovarian cystectomy       Surgeon:  Alma Chandler     Anesthesia:  General Endotracheal Anesthesia     Findings:  Normal uterus and fallopian tubes. Left ovary with 4 cm simple cyst, Right ovary with 8 cm teratoma including hair and sebaceous material, also 3 cm simple cyst and 1 cm hemorrhagic cyst     Estimated blood loss:  20 ml     Specimens:  Left ovarian cyst wall, right ovarian cyst wall      Complications:  none     Dictation Number:  [id number]     See dictated operative report for full details.

## 2020-02-19 NOTE — ANESTHESIA PRE PROCEDURE
Results   Component Value Date     05/17/2019    K 4.5 05/17/2019     05/17/2019    CO2 25 05/17/2019    BUN 9 05/17/2019    CREATININE 0.6 05/17/2019    GFRAA >60 05/17/2019    AGRATIO 1.3 05/17/2019    LABGLOM >60 05/17/2019    GLUCOSE 90 05/17/2019    PROT 7.2 05/17/2019    CALCIUM 9.4 05/17/2019    BILITOT 0.4 05/17/2019    ALKPHOS 87 05/17/2019    AST 13 05/17/2019    ALT 15 05/17/2019       POC Tests: No results for input(s): POCGLU, POCNA, POCK, POCCL, POCBUN, POCHEMO, POCHCT in the last 72 hours. Coags: No results found for: PROTIME, INR, APTT    HCG (If Applicable):   Lab Results   Component Value Date    PREGTESTUR Negative 02/19/2020        ABGs: No results found for: PHART, PO2ART, SLL0OFL, TLW5OVP, BEART, W6YGRAVO     Type & Screen (If Applicable):  No results found for: LABABO, LABRH    Anesthesia Evaluation   no history of anesthetic complications:   Airway: Mallampati: II  TM distance: >3 FB   Neck ROM: full  Mouth opening: > = 3 FB Dental: normal exam         Pulmonary:                              Cardiovascular:Negative CV ROS                      Neuro/Psych:   (+) psychiatric history:depression/anxiety             GI/Hepatic/Renal:   (+) GERD:,           Endo/Other: Negative Endo/Other ROS                    Abdominal:           Vascular: negative vascular ROS. Anesthesia Plan      general     ASA 2     (Pt agrees to risks, benefits and alternatives of GETA. Questions answered. Willing to proceed with plan.)  Induction: intravenous. Anesthetic plan and risks discussed with patient and spouse.                       Nabeel Cabrera MD   2/19/2020

## 2020-02-19 NOTE — OP NOTE
[address]                 [address] [facility], [address]                                OPERATIVE REPORT    PATIENT NAME: Bud Colvin                    :        1980  MED REC NO:   [id number]                          ROOM:  ACCOUNT NO:   [de-identified]                           ADMIT DATE: 2020  PROVIDER:     Leyla Orozco MD    DATE OF PROCEDURE:  2020    PREOPERATIVE DIAGNOSIS:  Ovarian cysts. POSTOPERATIVE DIAGNOSES:  Left simple ovarian cyst, right ovarian  teratoma and simple cyst.    OPERATION PERFORMED:  Laparoscopic bilateral ovarian cystectomy. SURGEON:  Leyla Orozco MD    ANESTHESIA:  General.    COMPLICATIONS:  None. EBL:  20 mL. SPECIMENS:  1. Left ovarian cyst wall. 2.  Right ovarian cyst wall. FINDINGS:  Normal uterus and fallopian tubes as well as upper abdominal  survey. Left ovary with a 4-cm simple cyst.  Right ovary with an 8-cm  teratoma including hair and  sebaceous material.  Also right ovary with  a 3-cm simple cyst and a 1-cm hemorrhagic cyst.     INDICATIONS:  The patient is a 24-year-old with incidental finding of  large right ovarian complex cyst on  ultrasound for IUD  surveillance. IUD was noted in correct location. The patient did  notice that she did have intermittent right pain for few months and  desired definitive removal.  Risks, benefits, and alternatives of the  procedure were discussed. Questions were answered. Consent was signed. OPERATIVE PROCEDURE:  The patient was taken to the operating room. She  was placed under general anesthesia with SCDs on and working. When this  was adequate, she was placed in dorsal lithotomy position, and prepped  and draped in normal sterile fashion. Her bladder was emptied. Mcgovern  retractors were placed inside of the vagina. A single-tooth tenaculum  was placed on the anterior cervical lip and an Plainwell manipulator was  inserted. Attention was turned to the abdomen, where a 5-mm umbilical  incision was made and a Veress needle was inserted into the peritoneum. This was confirmed with low opening abdominal pressure. The abdomen was  insufflated and a 5-mm trocar was inserted inside of the umbilicus. The  patient was placed in steep Trendelenburg and the above findings were  noted. Two 5-mm trocars were then placed, one each in the right and  left lower quadrants under direct visualization. On inspection of the  pelvis and manipulation of the left ovary, the simple cyst ruptured with  serous fluid noted to be expressed. A larger incision was made with the  monopolar scissors and the cyst wall was removed. Attention was turned  to the larger right ovarian cyst and an incision was attempted in the  ovarian cortex, but the cyst was ruptured. Copious sebaceous fluid as  well as hair were removed, and the abdomen and pelvis were irrigated  with 2 liters. All of the hair was removed and the cyst wall was  excised. The smaller simple cyst was opened and evacuated as well as  the small hemorrhagic cyst was opened. All of the incisions were made  hemostatic with monopolar cautery. When no further sebaceous fluid was  irrigated from the pelvis, the case was concluded, instruments were  removed, and the skin was closed with 4-0 Monocryl and Dermabond. Instruments were removed from the vagina. The patient tolerated the  procedure well. Sponge, lap, and needle counts were correct x2. I  performed the procedure.         Luis Beth MD    D: 02/19/2020 9:27:01       T: 02/19/2020 13:02:37     [id number]  Job#: [id number]     Doc#: [id number]    CC:

## 2020-04-30 RX ORDER — ESCITALOPRAM OXALATE 10 MG/1
10 TABLET ORAL DAILY
Qty: 30 TABLET | Refills: 2 | Status: SHIPPED | OUTPATIENT
Start: 2020-04-30 | End: 2020-08-05 | Stop reason: SDUPTHER

## 2020-07-08 ENCOUNTER — OFFICE VISIT (OUTPATIENT)
Dept: FAMILY MEDICINE CLINIC | Age: 40
End: 2020-07-08
Payer: COMMERCIAL

## 2020-07-08 VITALS
HEART RATE: 71 BPM | DIASTOLIC BLOOD PRESSURE: 90 MMHG | OXYGEN SATURATION: 98 % | TEMPERATURE: 96.9 F | RESPIRATION RATE: 18 BRPM | BODY MASS INDEX: 36.67 KG/M2 | WEIGHT: 241.2 LBS | SYSTOLIC BLOOD PRESSURE: 158 MMHG

## 2020-07-08 PROBLEM — J98.59 MEDIASTINAL MASS: Status: ACTIVE | Noted: 2020-07-08

## 2020-07-08 LAB — LACTATE DEHYDROGENASE: 146 U/L (ref 100–190)

## 2020-07-08 PROCEDURE — G8427 DOCREV CUR MEDS BY ELIG CLIN: HCPCS | Performed by: NURSE PRACTITIONER

## 2020-07-08 PROCEDURE — 1036F TOBACCO NON-USER: CPT | Performed by: NURSE PRACTITIONER

## 2020-07-08 PROCEDURE — G8417 CALC BMI ABV UP PARAM F/U: HCPCS | Performed by: NURSE PRACTITIONER

## 2020-07-08 PROCEDURE — 99214 OFFICE O/P EST MOD 30 MIN: CPT | Performed by: NURSE PRACTITIONER

## 2020-07-08 RX ORDER — MELOXICAM 15 MG/1
15 TABLET ORAL DAILY
Qty: 30 TABLET | Refills: 5 | Status: SHIPPED | OUTPATIENT
Start: 2020-07-08 | End: 2021-01-04

## 2020-07-08 RX ORDER — HYDROCHLOROTHIAZIDE 25 MG/1
25 TABLET ORAL EVERY MORNING
Qty: 30 TABLET | Refills: 5 | Status: SHIPPED | OUTPATIENT
Start: 2020-07-08 | End: 2021-01-04

## 2020-07-08 ASSESSMENT — ENCOUNTER SYMPTOMS
NAUSEA: 0
CONSTIPATION: 0
BACK PAIN: 0
DIARRHEA: 0
CHEST TIGHTNESS: 0
SHORTNESS OF BREATH: 0

## 2020-07-08 NOTE — PROGRESS NOTES
hypertension. Diagnoses and all orders for this visit:    Essential hypertension  -     hydroCHLOROthiazide (HYDRODIURIL) 25 MG tablet; Take 1 tablet by mouth every morning    Lung nodule < 6cm on CT    Mediastinal mass  -     LACTATE DEHYDROGENASE  -     AFP TUMOR MARKER  -     HCG, SERUM, QUALITATIVE    Chronic pain of both knees  -     meloxicam (MOBIC) 15 MG tablet;  Take 1 tablet by mouth daily                ROSELIA AYALA-KRISSY NUNEZ - CNP

## 2020-07-09 LAB — HCG QUALITATIVE: NEGATIVE

## 2020-07-13 LAB — AFP: 4.9 UG/L

## 2020-08-05 ENCOUNTER — OFFICE VISIT (OUTPATIENT)
Dept: FAMILY MEDICINE CLINIC | Age: 40
End: 2020-08-05
Payer: COMMERCIAL

## 2020-08-05 VITALS
SYSTOLIC BLOOD PRESSURE: 122 MMHG | WEIGHT: 234 LBS | BODY MASS INDEX: 35.58 KG/M2 | OXYGEN SATURATION: 98 % | DIASTOLIC BLOOD PRESSURE: 72 MMHG | TEMPERATURE: 97.1 F | HEART RATE: 65 BPM

## 2020-08-05 PROBLEM — I10 ESSENTIAL HYPERTENSION: Status: ACTIVE | Noted: 2020-08-05

## 2020-08-05 PROCEDURE — 1036F TOBACCO NON-USER: CPT | Performed by: NURSE PRACTITIONER

## 2020-08-05 PROCEDURE — 99213 OFFICE O/P EST LOW 20 MIN: CPT | Performed by: NURSE PRACTITIONER

## 2020-08-05 PROCEDURE — G8417 CALC BMI ABV UP PARAM F/U: HCPCS | Performed by: NURSE PRACTITIONER

## 2020-08-05 PROCEDURE — G8427 DOCREV CUR MEDS BY ELIG CLIN: HCPCS | Performed by: NURSE PRACTITIONER

## 2020-08-05 RX ORDER — ESCITALOPRAM OXALATE 20 MG/1
20 TABLET ORAL DAILY
Qty: 30 TABLET | Refills: 5 | Status: SHIPPED | OUTPATIENT
Start: 2020-08-05 | End: 2021-01-26

## 2020-08-05 ASSESSMENT — ENCOUNTER SYMPTOMS
CHEST TIGHTNESS: 0
CONSTIPATION: 0
DIARRHEA: 0

## 2020-10-05 ENCOUNTER — TELEPHONE (OUTPATIENT)
Dept: FAMILY MEDICINE CLINIC | Age: 40
End: 2020-10-05

## 2020-10-05 NOTE — TELEPHONE ENCOUNTER
----- Message from Jr Russ sent at 10/5/2020  1:55 PM EDT -----  Subject: Exposure to Contagious Disease    QUESTIONS  Which Disease is the patient concerned about? Other - Hepatitis  When was the patient exposed? 2020-09-21  Where was the patient exposed? Home  Is the patient experiencing any symptoms? No  Has there been recent travel? No  Additional Information for Provider? Pt just found out from her ex   boyfriend of 5 years that he tested positive for Hepatitis. She wants to   be tested. ---------------------------------------------------------------------------  --------------  [facility] [employer]  What is the best way for the office to contact you? OK to leave message on   voicemail  Preferred Call Back Phone Number?  [phone]

## 2020-10-08 ENCOUNTER — OFFICE VISIT (OUTPATIENT)
Dept: FAMILY MEDICINE CLINIC | Age: 40
End: 2020-10-08
Payer: COMMERCIAL

## 2020-10-08 VITALS
DIASTOLIC BLOOD PRESSURE: 82 MMHG | SYSTOLIC BLOOD PRESSURE: 110 MMHG | OXYGEN SATURATION: 97 % | HEART RATE: 91 BPM | TEMPERATURE: 97.2 F | RESPIRATION RATE: 16 BRPM | BODY MASS INDEX: 34.76 KG/M2 | WEIGHT: 228.6 LBS

## 2020-10-08 PROCEDURE — 99213 OFFICE O/P EST LOW 20 MIN: CPT | Performed by: NURSE PRACTITIONER

## 2020-10-08 PROCEDURE — G8417 CALC BMI ABV UP PARAM F/U: HCPCS | Performed by: NURSE PRACTITIONER

## 2020-10-08 PROCEDURE — G8484 FLU IMMUNIZE NO ADMIN: HCPCS | Performed by: NURSE PRACTITIONER

## 2020-10-08 PROCEDURE — 1036F TOBACCO NON-USER: CPT | Performed by: NURSE PRACTITIONER

## 2020-10-08 PROCEDURE — G8427 DOCREV CUR MEDS BY ELIG CLIN: HCPCS | Performed by: NURSE PRACTITIONER

## 2020-10-08 ASSESSMENT — ENCOUNTER SYMPTOMS
COUGH: 0
COLOR CHANGE: 0
CONSTIPATION: 0
ABDOMINAL PAIN: 0
CHEST TIGHTNESS: 0
BACK PAIN: 0
ABDOMINAL DISTENTION: 0
DIARRHEA: 0
SHORTNESS OF BREATH: 0

## 2020-10-08 NOTE — PROGRESS NOTES
Subjective:      Patient ID: Reece Carroll is a 37 y.o. female. HPI     Here today for lab testing. Recently broke up with boyfriend of 5 and a half years. Boyfriend told her recently after the breakup in July that he had sexual relations with a previous woman partner prior to Cathilyn that had Hepatitis. Ex-Boyfriend did not state what kind of Hepatitis he may have but he was going to be tested. Review of Systems   Constitutional: Negative for chills, fatigue and fever. Respiratory: Negative for cough, chest tightness and shortness of breath. Cardiovascular: Negative for chest pain and palpitations. Gastrointestinal: Negative for abdominal distention, abdominal pain, constipation and diarrhea. Genitourinary: Negative for difficulty urinating. Musculoskeletal: Negative for arthralgias, back pain and gait problem. Skin: Negative for color change. Neurological: Negative for dizziness, light-headedness and headaches. Hematological: Bruises/bleeds easily. Psychiatric/Behavioral: Negative for agitation. The patient is nervous/anxious. Objective:   Physical Exam  Constitutional:       Appearance: Normal appearance. She is well-developed. HENT:      Head: Normocephalic and atraumatic. Cardiovascular:      Rate and Rhythm: Normal rate and regular rhythm. Pulses: Normal pulses. Heart sounds: Normal heart sounds. No murmur. No gallop. Pulmonary:      Effort: Pulmonary effort is normal. No respiratory distress. Breath sounds: Normal breath sounds. No wheezing. Abdominal:      General: Bowel sounds are normal. There is no distension. Palpations: Abdomen is soft. Tenderness: There is no abdominal tenderness. Musculoskeletal: Normal range of motion. Right lower leg: No edema. Left lower leg: No edema. Skin:     General: Skin is warm and dry. Neurological:      Mental Status: She is alert and oriented to person, place, and time.    Psychiatric: Comments: Anxious/restless         Current Outpatient Medications   Medication Sig Dispense Refill    escitalopram (LEXAPRO) 20 MG tablet Take 1 tablet by mouth daily 30 tablet 5    hydroCHLOROthiazide (HYDRODIURIL) 25 MG tablet Take 1 tablet by mouth every morning 30 tablet 5    meloxicam (MOBIC) 15 MG tablet Take 1 tablet by mouth daily 30 tablet 5    acetaminophen (TYLENOL) 325 MG tablet Take 650 mg by mouth every 6 hours as needed for Pain       No current facility-administered medications for this visit. Assessment:      1. Hepatitis exposure-labs ordered        Plan:      1. Bertin was seen today for labs only. Diagnoses and all orders for this visit:    Encounter for screening for HIV  -     Cancel: HIV Screen  -     HIV Screen    Exposure to hepatitis  -     Cancel: Hepatitis Panel, Acute  -     Hepatitis Panel, Acute    2. Will call with lab results     3. Discussed in positive blood work pt benny be referred to GILauri BUSH, APRN - CNP

## 2020-10-09 LAB
HAV IGM SER IA-ACNC: NORMAL
HEPATITIS B CORE IGM ANTIBODY: NORMAL
HEPATITIS B SURFACE ANTIGEN INTERPRETATION: NORMAL
HEPATITIS C ANTIBODY INTERPRETATION: NORMAL
HIV AG/AB: NORMAL
HIV ANTIGEN: NORMAL
HIV-1 ANTIBODY: NORMAL
HIV-2 AB: NORMAL

## 2020-10-28 PROBLEM — R14.0 ABDOMINAL BLOATING: Status: RESOLVED | Noted: 2017-07-12 | Resolved: 2020-10-28

## 2020-10-28 PROBLEM — L91.8 INFLAMED SKIN TAG: Status: RESOLVED | Noted: 2018-06-12 | Resolved: 2020-10-28

## 2020-12-17 ENCOUNTER — TELEPHONE (OUTPATIENT)
Dept: FAMILY MEDICINE CLINIC | Age: 40
End: 2020-12-17

## 2020-12-18 ENCOUNTER — OFFICE VISIT (OUTPATIENT)
Dept: ORTHOPEDIC SURGERY | Age: 40
End: 2020-12-18
Payer: COMMERCIAL

## 2020-12-18 VITALS — WEIGHT: 228 LBS | BODY MASS INDEX: 34.56 KG/M2 | HEIGHT: 68 IN

## 2020-12-18 PROCEDURE — G8484 FLU IMMUNIZE NO ADMIN: HCPCS | Performed by: ORTHOPAEDIC SURGERY

## 2020-12-18 PROCEDURE — 99243 OFF/OP CNSLTJ NEW/EST LOW 30: CPT | Performed by: ORTHOPAEDIC SURGERY

## 2020-12-18 PROCEDURE — G8427 DOCREV CUR MEDS BY ELIG CLIN: HCPCS | Performed by: ORTHOPAEDIC SURGERY

## 2020-12-18 PROCEDURE — L1810 KO ELASTIC WITH JOINTS: HCPCS | Performed by: ORTHOPAEDIC SURGERY

## 2020-12-18 PROCEDURE — G8417 CALC BMI ABV UP PARAM F/U: HCPCS | Performed by: ORTHOPAEDIC SURGERY

## 2020-12-18 NOTE — PROGRESS NOTES
Well  at 18 Months     Nutrition  Family meals are important for your baby. Let him eat with you. This helps him learn that eating is a time to be together and talk with others. Don't make mealtime a fraser. Let your baby feed himself. Your child should use a spoon and drink from an open-rimmed cup (not a sippy-cup). Development   Children at this age should be learning many new words. You can help your child's vocabulary grow by showing and naming lots of things. Children at this age can engage in pretend play. They will look where you point and will try to get your attention when they want to point something out to you. Children have many different feelings and behaviors such as pleasure, anger, jayesh, curiosity, warmth, and assertiveness. Praise your child for doing things that you like. Toilet Training  At 18 months, most toddlers are not yet showing signs that they are ready for toilet training. When toddlers report to parents that they have wet or soiled their diaper, they are starting to be aware that they prefer dryness. This is a good sign and you should praise your child. Toddlers are naturally curious about the use of the bathroom by other people. Let them watch you or other family members use the toilet. It is important not to put too many demands on a child or shame the child during toilet training. Behavior Control  Toddlers sometimes seem out of control, or too stubborn or demanding. At this age, children often say \"no\". To help children learn about rules:  Divert and substitute. If a child is playing with something you don't want him to have, replace it with another object or toy that he enjoys. This approach avoids a fight and does not place children in a situation where they'll say \"no. \"   Teach and lead. Have as few rules as necessary and enforce them. Make rules for the child's safety.  If a rule is broken, after a short, clear, and gentle explanation, immediately find a place for your child to sit alone for 1 minute. It is very important that a \"time-out\" comes right after a rule is broken. Make consequences as logical as possible. For example, if you don't stay in your car seat, the car doesn't go. If you throw your food, you don't get any more and may be hungry. Be consistent with discipline. Don't make threats that you cannot carry out. If you say you're going to do it, do it. Be warm and positive. Children like to please their parents. Give lots of praise and be enthusiastic. When children misbehave, stay calm and say \"We can't do that. The rule is ________. \" Then repeat the rule. Most toddlers at this age are not yet ready for time-outs. Reading and Electronic Media  Toddlers have short attention spans, so stories should always be short, simple, and have lots of pictures. The best choices are large-format books that develop one main character through action and activity. Make sure the books have happy, clear-cut endings. It is important to set rules about television watching. Limit total TV time to no more than 1 hour per day. Watch TV shows with your child and discuss them with her. Dental Care   After meals and before bedtime, clean your toddler's teeth with a clean cloth or very soft toothbrush. Safety Tips  Child-proof the home. Go through every room in your house and remove anything that is valuable, dangerous, or messy. Preventive child-proofing will stop many possible discipline problems. Don't expect a child not to get into things just because you say no. Remove guns from the home. If you have a gun, store it unloaded and locked. Store the ammunition in a separate place that is also locked. Choking and Suffocation  Keep plastic bags, balloons, and small hard objects out of reach. Cut foods into small pieces. Store toys in a chest without a dropping lid. Fires and [employer] and cords out of reach.    Don't cook with your child at your  Years of education: Not on file    Highest education level: Not on file   Occupational History    Not on file   Social Needs    Financial resource strain: Not on file    Food insecurity     Worry: Not on file     Inability: Not on file    Transportation needs     Medical: Not on file     Non-medical: Not on file   Tobacco Use    Smoking status: Never Smoker    Smokeless tobacco: Never Used   Substance and Sexual Activity    Alcohol use: No    Drug use: No    Sexual activity: Yes     Partners: Male     Birth control/protection: I.U.D. Lifestyle    Physical activity     Days per week: Not on file     Minutes per session: Not on file    Stress: Not on file   Relationships    Social connections     Talks on phone: Not on file     Gets together: Not on file     Attends Anabaptism service: Not on file     Active member of club or organization: Not on file     Attends meetings of clubs or organizations: Not on file     Relationship status: Not on file    Intimate partner violence     Fear of current or ex partner: Not on file     Emotionally abused: Not on file     Physically abused: Not on file     Forced sexual activity: Not on file   Other Topics Concern    Not on file   Social History Narrative    Not on file       Family History   Problem Relation Age of Onset    Diabetes Mother     Depression Mother     High Blood Pressure Mother     High Cholesterol Mother           REVIEW OF SYSTEMS  Pertinent items are noted in HPI  Review of systems reviewed from Patient History Form dated on 12/18/2020 and available in the patient's chart under the Media tab. PHYSICAL EXAM    Vitals:    12/18/20 0926   Weight: 228 lb (103.4 kg)   Height: 5' 8\" (1.727 m)       General Exam:   Constitutional: Patient is adequately groomed with no evidence of malnutrition  Mental Status: The patient is oriented to time, place and person. The patient's mood and affect are appropriate. Lymphatic: The lymphatic examination bilaterally reveals all areas to be without enlargement or induration. Neurological: The patient has good coordination. There is no weakness or sensory deficit. Antalgic gait:  Yes    Bilateral lower extremities are neurovascularly intact with symmetric light touch sensation and distal pulses. Left Knee Exam:  No skin lesions, erythema, or warmth. No joint effusion. No joint line tenderness. Negative Iveth. Ligaments stable. Quad tone good. ROM 0-140    Right Knee Exam:  No skin lesions, erythema, or warmth. Effusion: 0+/4  Range Of Motion: 0135    Hyperextension Pain:    positive  Hyperflexion Pain:     positive  Iveth:      positive  Medial Joint Line Tenderness:   positive  Lateral Joint Line Tenderness: negative    Anterior Drawer:   negative  Posterior Drawer:   negative  Lachman:   Grade 1B  Pivot Shift:  Guarding  Valgus Stress:   negative  Varus Stress:   negative      REVIEW OF IMAGING  4 Views AP/PA 45 degree/Lateral/Sunrise of the right knee dated 12/18/2020 demonstrate no acute fracture. No dislocation. No major degenerative changes. Normal alignment. No visible bony lesions or loose bodies. Of note there are some degenerative changes medial compartment of the left knee. ASSESSMENT  66-year-old female with a right knee possible ACL injury, chronic, possible meniscus tear        Procedures    Breg / DJO Economy Hinged Knee Brace     Patient was prescribed an Economy Hinged Knee Brace. The right knee will require stabilization / immobilization from this semi-rigid / rigid orthosis to improve their function. The orthosis will assist in protecting the affected area, provide functional support and facilitate healing. feet.   Keep hot foods and liquids out of reach. Keep matches and lighters out of reach. Turn your water heater down to 120Â°F (50Â°C). Falls  Make sure that drawers, furniture, and lamps cannot be tipped over. Do not place furniture (on which children may climb) near windows or on balconies. Use stair york. Install window guards on windows above the first floor (unless this is against your local fire codes.)   Make sure windows are closed or have screens that cannot be pushed out. Don't underestimate your child's ability to climb. Car Safety  Never leave your child alone in the car. Use an approved toddler car seat correctly and wear your seat belt. Pedestrian Safety  Hold onto your child when you are near traffic. Provide a play area where balls and riding toys cannot roll into the street. Water Safety  Never leave an infant or toddler in a bathtub alone â NEVER. Continuously watch your child around any water, including toilets and buckets. Keep the lids of toilets down. Never leave water in an unattended bucket and store buckets upside down. Poisoning  Keep all medicines, vitamins, cleaning fluids, and other chemicals locked away. Put the poison center number on all phones. Buy medicines in containers with safety caps. Do not store poisons in drink bottles, glasses, or jars. Smoking  Children who live in a house where someone smokes have more respiratory infections. Their symptoms are also more severe and last longer than those of children who live in a smoke-free home. If you smoke, set a quit date and stop. Set a good example for your child. If you cannot quit, do NOT smoke in the house or near children. Immunizations  At the 18-month visit, your baby may receive a shot, Hepatitis A. Children during the first 2 years of life should get a total of 3 flu shots. Ask your healthcare provider about influenza shots if you have questions about them.   Your baby may run a The patient was educated and fit by a healthcare professional with expert knowledge and specialization in brace application while under the direct supervision of the treating physician. Verbal and written instructions for the use of and application of this item were provided. They were instructed to contact the office immediately should the brace result in increased pain, decreased sensation, increased swelling or worsening of the condition. PLAN  -Diagnosis and treatment options discussed in detail today  -Given the length of time since her last visit as well as her continued pain and feeling of instability we will get an MRI to evaluate her ACL as well as for any other pathology  -Ice, active modification, meloxicam  -We will provide her with a brace today for some support in the interim and after her MRI we will see her back to discuss via phone/virtual visit and if there are issues interim she may contact the office  -Thank you for the opportunity to take part in the care of this patient    MD Mark Pink 56 partner of [facility] ([facility])      Voice Recognition Dictation disclaimer: Please note that portions of this chart were generated using Dragon dictation software. Although every effort was made to ensure the accuracy of this automated transcription, some errors in transcription may have occurred. fever and be irritable for about 1 day after the shots. Your baby may also have some soreness, redness, and swelling in the area where the shots were given. You may give your child acetaminophen drops in the appropriate dose to prevent fever and irritability. For swelling or soreness, put a wet, warm washcloth on the area of the shots as often and as long as needed for comfort. Call your child's healthcare provider if:  Your child has a rash or any reaction to the shots other than fever and mild irritability. Your child has a fever that lasts more than 36 hours. Next Visit  Your child's next visit should be at the age of 2 years. Bring your child's shot card to each visit. We are committed to providing you with the best care possible. In order to help us achieve these goals please remember to bring all medications, herbal products, and over the counter supplements with you to each visit. If your provider has ordered testing for you, please be sure to follow up with our office if you have not received results within 7 days after the testing took place. *If you receive a survey after visiting one of our offices, please take time to share your experience concerning your physician office visit. These surveys are confidential and no health information about you is shared. We are eager to improve for you and we are counting on your feedback to help make that happen.

## 2020-12-22 ENCOUNTER — TELEPHONE (OUTPATIENT)
Dept: ORTHOPEDIC SURGERY | Age: 40
End: 2020-12-22

## 2021-01-08 ENCOUNTER — OFFICE VISIT (OUTPATIENT)
Dept: ORTHOPEDIC SURGERY | Age: 41
End: 2021-01-08
Payer: COMMERCIAL

## 2021-01-08 VITALS — WEIGHT: 228 LBS | BODY MASS INDEX: 34.56 KG/M2 | HEIGHT: 68 IN

## 2021-01-08 DIAGNOSIS — S83.511D RUPTURE OF ANTERIOR CRUCIATE LIGAMENT OF RIGHT KNEE, SUBSEQUENT ENCOUNTER: Primary | ICD-10-CM

## 2021-01-08 DIAGNOSIS — M65.9 SYNOVITIS OF KNEE: ICD-10-CM

## 2021-01-08 PROCEDURE — 20610 DRAIN/INJ JOINT/BURSA W/O US: CPT | Performed by: ORTHOPAEDIC SURGERY

## 2021-01-08 PROCEDURE — G8484 FLU IMMUNIZE NO ADMIN: HCPCS | Performed by: ORTHOPAEDIC SURGERY

## 2021-01-08 PROCEDURE — G8427 DOCREV CUR MEDS BY ELIG CLIN: HCPCS | Performed by: ORTHOPAEDIC SURGERY

## 2021-01-08 PROCEDURE — 1036F TOBACCO NON-USER: CPT | Performed by: ORTHOPAEDIC SURGERY

## 2021-01-08 PROCEDURE — G8417 CALC BMI ABV UP PARAM F/U: HCPCS | Performed by: ORTHOPAEDIC SURGERY

## 2021-01-08 PROCEDURE — 99213 OFFICE O/P EST LOW 20 MIN: CPT | Performed by: ORTHOPAEDIC SURGERY

## 2021-01-08 RX ORDER — TRIAMCINOLONE ACETONIDE 40 MG/ML
40 INJECTION, SUSPENSION INTRA-ARTICULAR; INTRAMUSCULAR ONCE
Status: COMPLETED | OUTPATIENT
Start: 2021-01-08 | End: 2021-01-08

## 2021-01-08 RX ORDER — LIDOCAINE HYDROCHLORIDE 10 MG/ML
5 INJECTION, SOLUTION INFILTRATION; PERINEURAL ONCE
Status: COMPLETED | OUTPATIENT
Start: 2021-01-08 | End: 2021-01-08

## 2021-01-08 RX ADMIN — LIDOCAINE HYDROCHLORIDE 5 ML: 10 INJECTION, SOLUTION INFILTRATION; PERINEURAL at 11:14

## 2021-01-08 RX ADMIN — TRIAMCINOLONE ACETONIDE 40 MG: 40 INJECTION, SUSPENSION INTRA-ARTICULAR; INTRAMUSCULAR at 11:15

## 2021-01-08 NOTE — PROGRESS NOTES
Chief Complaint  Follow-up (Right knee MRI TR)      History of Present Illness:  Ko Olivia is a 41 y.o. y/o female who presents today for follow up of her left knee. She is here today to review her MRI. She continues a fairly significant pain. She does not report any navi instability recently. She has been taking some NSAIDs. Medical History  Past Medical History:   Diagnosis Date    Anxiety 10/18/2019    Chondromalacia of right patella     Depression     Essential hypertension 8/5/2020    Gastroesophageal reflux disease without esophagitis 7/12/2017    Irritable bowel syndrome without diarrhea 8/9/2017       Past Surgical History:   Procedure Laterality Date    CHOLECYSTECTOMY  2003    OTHER SURGICAL HISTORY  02/19/2020    laparoscopic bilateral ovarian cystectomy    OVARIAN CYST REMOVAL Bilateral 2/19/2020    LAPAROSCOPIC BILATERAL OVARIAN CYSTECTOMY performed by Guy Salguero MD at [address] [place] [address] TUMOR SOFT TISSUE BACK/FLANK SUBQ <3CM N/A 7/16/2019    WIDE LOCAL EXCISION BACK LESION performed by Duran Snow MD at [place] [address] History     Socioeconomic History    Marital status: Single     Spouse name: Not on file    Number of children: Not on file    Years of education: Not on file    Highest education level: Not on file   Occupational History    Not on file   Social Needs    Financial resource strain: Not on file    Food insecurity     Worry: Not on file     Inability: Not on file    Transportation needs     Medical: Not on file     Non-medical: Not on file   Tobacco Use    Smoking status: Never Smoker    Smokeless tobacco: Never Used   Substance and Sexual Activity    Alcohol use: No    Drug use: No    Sexual activity: Yes     Partners: Male     Birth control/protection: I.U.D.    Lifestyle    Physical activity     Days per week: Not on file     Minutes per session: Not on file    Stress: Not on file   Relationships    Social connections Talks on phone: Not on file     Gets together: Not on file     Attends Evangelical service: Not on file     Active member of club or organization: Not on file     Attends meetings of clubs or organizations: Not on file     Relationship status: Not on file    Intimate partner violence     Fear of current or ex partner: Not on file     Emotionally abused: Not on file     Physically abused: Not on file     Forced sexual activity: Not on file   Other Topics Concern    Not on file   Social History Narrative    Not on file       Family History   Problem Relation Age of Onset    Diabetes Mother     Depression Mother     High Blood Pressure Mother     High Cholesterol Mother         Review of Systems  Pertinent items are noted in HPI  Review of systems reviewed from Patient History Form dated on 12/18/2020 and available in the patient's chart under the Media tab. Vital Signs  There were no vitals filed for this visit. General Exam:   Constitutional: Patient is adequately groomed with no evidence of malnutrition  Mental Status: The patient is oriented to time, place and person. The patient's mood and affect are appropriate. Lymphatic: The lymphatic examination bilaterally reveals all areas to be without enlargement or induration. Neurological: The patient has good coordination. There is no weakness or sensory deficit. Gait: Mildly antalgic    Right knee examination  Inspection: No effusion or erythema    Palpation: Tenderness to palpation medial joint line, mild anterolateral tenderness    Range of Motion: 0135    Sensation: In tact to light touch all nerve distributions     Strength: Knee flexion and extension motor intact with moderate quad tone is a normal distal motor exam    Special Tests: Grade 1B Lachman, stable varus and valgus, anterior drawer only mildly increased compared to contralateral side    Skin: There are no additional worrisome rashes, ulcerations or lesions.     Circulation normal No traumatic tear or injury of the MCL or lateral collateral complex.  No posterolateral or    posteromedial corner injury.       No acute, active, or traumatic medial meniscus tear.       Mild synovial interdigitation of the lateral meniscus anterior root, without traumatic or    communicating tear.       Small, mostly retropatellar joint effusion.  No intra-articular bodies.       No high-grade chondromalacia or osteochondral defect of the medial or lateral femorotibial    compartment cartilage.       No acute or traumatic bony injury.       No acute or traumatic muscle tear or injury.       Mild distension of the gastrocnemius-semimembranosus bursa, without focal cyst formation.       The neurovascular bundle is intact.       CONCLUSION:   1. Severe mucoid degeneration of the ACL, relatively stable from prior study, but with    progression of cystic erosions at the anterior tibial spines and posterolateral notch wall.     Prior partial tear is less conspicuous on current study; no interval ligament rupture. 2. Mild-to-moderate notch synovitis, with swelling or synovial infiltration of the PCL, new    from prior study. 3. Grade 2-3 chondromalacia of the patellofemoral compartment, most prominent of the patellar    apex and trochlear groove; stable from prior study. 4. No acute, active, or traumatic medial or lateral meniscus tear. Assessment:  78-year-old female with a history of a right knee partial ACL injury with mucoid degeneration, synovitis of the knee at the intercondylar notch, mild degenerative changes    Office Procedures:  Orders Placed This Encounter   Procedures    TN ARTHROCENTESIS ASPIR&/INJ MAJOR JT/BURSA W/O US       Plan:   -We discussed that she did have evidence of a prior partial ACL injury and does have some synovitis mucoid degeneration.   I would recommend a corticosteroid injections with a diagnostic and therapeutic intervention to help with pain relief -She may continue with ice, activity modification, over-the-counter medications.  -We will also provide her with a home physical therapy exercise program hopefully improve her symptoms  -We discussed that she had did have persistent symptoms we could consider knee arthroscopy for partial synovectomy and possibly ACL reconstruction  -We will see her back in 6 weeks and if there are issues interim she may contact the office    Right Knee corticosteroid injection    After informed consent was provided including a discussion of risks such as infection, alternative treatments, and benefits verbal consent was obtained. The patient was seated on the exam table with the Right knee(s) flexed to 90 degrees. The anterolateral aspect of the knee adjacent to the joint line was prepped with Betadine and alcohol. A 22-gauge needle was inserted into the  Right knee and a mixture of 5 mL of 1% lidocaine and 2 ml of Kenalog was injected. The needle was withdrawn and the puncture site was cleaned with alcohol and sealed with a Band-Aid. The patient tolerated the procedure well. Isiah Goodman MD  [address] partner of [facility])        Voice Recognition Dictation disclaimer: Please note that portions of this chart were generated using Dragon dictation software. Although every effort was made to ensure the accuracy of this automated transcription, some errors in transcription may have occurred.

## 2021-01-19 ENCOUNTER — TELEPHONE (OUTPATIENT)
Dept: FAMILY MEDICINE CLINIC | Age: 41
End: 2021-01-19

## 2021-01-19 NOTE — TELEPHONE ENCOUNTER
----- Message from [address] sent at 1/18/2021  5:12 PM EST -----  Subject: Message to Provider    QUESTIONS  Information for Provider? Wants to know if Dr. Gerry Rucker can check her eyes   and if not If their is a specialist she can be referred to   would like a call back. ---------------------------------------------------------------------------  --------------  Flores DIMAS  What is the best way for the office to contact you? OK to leave message on   voicemail  Preferred Call Back Phone Number? [phone]  ---------------------------------------------------------------------------  --------------  SCRIPT ANSWERS  Relationship to Patient?  Self

## 2021-01-20 NOTE — TELEPHONE ENCOUNTER
She should follow up with eye doctor for that exam. The first step would be to check with her insurance to see if they cover eye exams and who do they cover.  Locally there is [facility], [employer], [facility], etc.

## 2021-03-24 ENCOUNTER — OFFICE VISIT (OUTPATIENT)
Dept: ORTHOPEDIC SURGERY | Age: 41
End: 2021-03-24
Payer: COMMERCIAL

## 2021-03-24 VITALS — HEIGHT: 68 IN | RESPIRATION RATE: 14 BRPM | TEMPERATURE: 97.3 F | BODY MASS INDEX: 34.8 KG/M2 | WEIGHT: 229.6 LBS

## 2021-03-24 DIAGNOSIS — M65.9 SYNOVITIS OF KNEE: ICD-10-CM

## 2021-03-24 DIAGNOSIS — S83.511D RUPTURE OF ANTERIOR CRUCIATE LIGAMENT OF RIGHT KNEE, SUBSEQUENT ENCOUNTER: ICD-10-CM

## 2021-03-24 DIAGNOSIS — M25.561 RIGHT KNEE PAIN, UNSPECIFIED CHRONICITY: Primary | ICD-10-CM

## 2021-03-24 PROCEDURE — G8417 CALC BMI ABV UP PARAM F/U: HCPCS | Performed by: ORTHOPAEDIC SURGERY

## 2021-03-24 PROCEDURE — G8484 FLU IMMUNIZE NO ADMIN: HCPCS | Performed by: ORTHOPAEDIC SURGERY

## 2021-03-24 PROCEDURE — 99204 OFFICE O/P NEW MOD 45 MIN: CPT | Performed by: ORTHOPAEDIC SURGERY

## 2021-03-24 PROCEDURE — G8427 DOCREV CUR MEDS BY ELIG CLIN: HCPCS | Performed by: ORTHOPAEDIC SURGERY

## 2021-03-24 PROCEDURE — 1036F TOBACCO NON-USER: CPT | Performed by: ORTHOPAEDIC SURGERY

## 2021-03-24 RX ORDER — METHYLPREDNISOLONE 4 MG/1
TABLET ORAL
Qty: 1 KIT | Refills: 0 | Status: SHIPPED | OUTPATIENT
Start: 2021-03-24 | End: 2021-03-30

## 2021-03-24 NOTE — PROGRESS NOTES
CHIEF COMPLAINT: Right knee pain. DATE OF INJURY: 7 years ago    History:   Frankie Buitrago is a 41 y.o. female self-referred for evaluation and treatment of right knee pain / injury. The patient complains of right knee pain. This is evaluated as a personal injury. She has previously seen Dr. Amy Saldivar. The pain began 7 years ago. Rate pain 10/10. There was a history of injury. She had multiple falls 7 years ago. She states she saw Dr. Angle Bragg and he want to do surgery at that time. She states she cannot afford to have surgery at that time and did not proceed. Has had increased pain over the last 5 years. The pain is located globally. Pain is worse with walking, and sitting for long time. She feels like she has catching sensations posteriorly. She feels like her knee gives out sometimes. There is swelling in the knee. The patient has had PT in approximately 2017, which she states did not help. . The patient has had an injection, with relief for about 1 week. The patient has taken NSAIDs without relief. The patient has tried ice. The patient's occupation is at [employer]. Outside reports reviewed: Dr. Bynum Man [facility] office note 12/18/20 and 1/8/21.     Past Medical History:   Diagnosis Date    Anxiety 10/18/2019    Chondromalacia of right patella     Depression     Essential hypertension 8/5/2020    Gastroesophageal reflux disease without esophagitis 7/12/2017    Irritable bowel syndrome without diarrhea 8/9/2017       Past Surgical History:   Procedure Laterality Date    CHOLECYSTECTOMY  2003    OTHER SURGICAL HISTORY  02/19/2020    laparoscopic bilateral ovarian cystectomy    OVARIAN CYST REMOVAL Bilateral 2/19/2020    LAPAROSCOPIC BILATERAL OVARIAN CYSTECTOMY performed by Julieanne Phoenix, MD at [address] [facility] TUMOR SOFT TISSUE BACK/FLANK SUBQ <3CM N/A 7/16/2019    WIDE LOCAL EXCISION BACK LESION performed by Dewey Lemos MD at [address]       Family History   Problem Relation Age of Onset  Diabetes Mother     Depression Mother     High Blood Pressure Mother     High Cholesterol Mother        Social History     Socioeconomic History    Marital status: Single     Spouse name: None    Number of children: None    Years of education: None    Highest education level: None   Occupational History    None   Social Needs    Financial resource strain: None    Food insecurity     Worry: None     Inability: None    Transportation needs     Medical: None     Non-medical: None   Tobacco Use    Smoking status: Never Smoker    Smokeless tobacco: Never Used   Substance and Sexual Activity    Alcohol use: No    Drug use: No    Sexual activity: Yes     Partners: Male     Birth control/protection: I.U.D. Lifestyle    Physical activity     Days per week: None     Minutes per session: None    Stress: None   Relationships    Social connections     Talks on phone: None     Gets together: None     Attends [employer] service: None     Active member of club or organization: None     Attends meetings of clubs or organizations: None     Relationship status: None    Intimate partner violence     Fear of current or ex partner: None     Emotionally abused: None     Physically abused: None     Forced sexual activity: None   Other Topics Concern    None   Social History Narrative    None       Current Outpatient Medications   Medication Sig Dispense Refill    escitalopram (LEXAPRO) 20 MG tablet TAKE ONE TABLET BY MOUTH DAILY 90 tablet 1    meloxicam (MOBIC) 15 MG tablet TAKE ONE TABLET BY MOUTH DAILY 90 tablet 1    hydroCHLOROthiazide (HYDRODIURIL) 25 MG tablet TAKE ONE TABLET BY MOUTH EVERY MORNING 90 tablet 1    acetaminophen (TYLENOL) 325 MG tablet Take 650 mg by mouth every 6 hours as needed for Pain       No current facility-administered medications for this visit.         Allergies   Allergen Reactions    No Known Allergies     Other Itching and Rash     Patient states she had generalized itching all over after local anesthetic from last time this lesion was excised. Review of Systems:  I have reviewed the clinically relevant past medical history, medications, allergies, family history, social history, and 13 point Review of Systems from the patient's recent history form & documented any details relevant to today's presenting complaints in the history above. The patient's self-reported past medical history, medications, allergies, family history, social history, and Review of Systems form from 3/24/21 have been scanned into the chart under the \"Media\" tab. Physical Examination:     Vital signs:   Temp 97.3 °F (36.3 °C) (Infrared)   Resp 14   Ht 5' 8\" (1.727 m)   Wt 229 lb 9.6 oz (104.1 kg)   BMI 34.91 kg/m²     General:  alert, appears stated age, cooperative and no distress   Gait:  Normal. The patient can bear weight on the injured extremity. Right Knee  Alignment:  neutral   ROM:  0 degrees extension to 120 degrees flexion   Bilateral knees   Crepitus:  patellar   Joint Tenderness:  lateral joint line and medial joint line   Effusion:   20 cc   Patellar excursion:  2 of 4 quadrants    Patellar tilt test:  positive   Patellar facet tenderness:  negative medial   negative lateral   Patellar apprehension test:  negative   Lachman test:  positive with endpoint   Left knee: negative   Anterior drawer test:  mildly positive with endpoint   Left knee: negative   Posterior drawer:   negative    Left knee: negative   Varus laxity at 30 degrees:  negative   Left knee: negative   Valgus laxity at 30 degrees:   negative   Left knee: negative   Iveth's test:  positive   Left knee: negative     There is not any cellulitis, lymphedema or cutaneous lesions noted in the lower extremities. Motor exam of the lower extremities show quadriceps, hamstrings, foot dorsiflexion and plantarflexion grossly intact. Sensation to both feet is grossly intact to light touch.   The bilateral lower extremities are ACL reconstruction being able to completely alleviate all her pain. We will try to exhaust all nonoperative management first.    We have sized her for an ACL functional brace today and then we will order this and have this applied. We will see whether some increased stability around her knee improves her knee pain. If not, then I would have serious reservations about whether an ACL reconstruction should be even indicated. I have also provided her a referral to physical therapy again for 1-2 visits to demonstrate a home exercise program for quad strengthening. Ice as needed. NSAIDs as needed. Script for Medrol dosepak provided. We will see her back in 6 weeks to assess how she has been doing. Mendoza Cruz. Chapito Mei MD  Orthopaedic Surgery and Sports Medicine     Disclaimer: This note was generated with use of a verbal recognition program (DRAGON) and an attempt was made to check for errors. It is possible that there are still dictated errors within this office note. If so, please bring any significant errors to my attention for an addendum. All efforts were made to ensure that this office note is accurate.

## 2021-03-30 DIAGNOSIS — M25.561 RIGHT KNEE PAIN, UNSPECIFIED CHRONICITY: Primary | ICD-10-CM

## 2021-03-30 DIAGNOSIS — S83.511D RUPTURE OF ANTERIOR CRUCIATE LIGAMENT OF RIGHT KNEE, SUBSEQUENT ENCOUNTER: ICD-10-CM

## 2021-04-08 ENCOUNTER — TELEPHONE (OUTPATIENT)
Dept: ORTHOPEDIC SURGERY | Age: 41
End: 2021-04-08

## 2021-04-08 ENCOUNTER — HOSPITAL ENCOUNTER (OUTPATIENT)
Dept: PHYSICAL THERAPY | Age: 41
Setting detail: THERAPIES SERIES
Discharge: HOME | End: 2021-04-08
Payer: COMMERCIAL

## 2021-04-08 NOTE — FLOWSHEET NOTE
[address] [facility], [address] [place] [address], [address] [place] [address] [address]  Phone: [phone]   Fax:     [phone]    Physical Therapy  Cancellation/No-show Note  Patient Name:  Vlad Alberto  :  1980   Date:  2021    Cancelled visits to date: 1  No-shows to date: 0    For today's appointment patient:  [x]  Cancelled  [x]  Rescheduled appointment  []  No-show     Reason given by patient:  []  Patient ill  []  Conflicting appointment  [x]  No transportation    []  Conflict with work  []  No reason given  []  Other:     Comments:      Phone call information:   []  Phone call made today to patient at _ time at number provided:      []  Patient answered, conversation as follows:    []  Patient did not answer, message left as follows:  []  Phone call not made today  [x]  Phone call not needed - pt contacted us to cancel and provided reason for cancellation.      Electronically signed by:  Estefany Michelle PT

## 2021-04-08 NOTE — TELEPHONE ENCOUNTER
4/8/21 DME  - APPROVED  AUTH# [id number]  3/30/21 - 6/30/21  PER RACHEAL ARANA @ [facility]    DEDUCTIBLE: NONE      1 EVERY 2YRS    PER JAMES @ [facility]  REF #  [id number]

## 2021-04-10 ENCOUNTER — IMMUNIZATION (OUTPATIENT)
Dept: PRIMARY CARE CLINIC | Age: 41
End: 2021-04-10
Payer: COMMERCIAL

## 2021-04-10 PROCEDURE — 91300 COVID-19, PFIZER VACCINE 30MCG/0.3ML DOSE: CPT | Performed by: FAMILY MEDICINE

## 2021-04-10 PROCEDURE — 0001A COVID-19, PFIZER VACCINE 30MCG/0.3ML DOSE: CPT | Performed by: FAMILY MEDICINE

## 2021-04-13 NOTE — PLAN OF CARE
[address] [place] [address] and Sports [place] [address] [address] [facility], [address] [facility] [address]  Phone: [phone]   Fax:     [phone]       Matthew Stanley    Dear  Dr Rd Edmond,    We had the pleasure of evaluating the following patient for physical therapy services at [address] [place] [address]. A summary of our findings can be found in the initial assessment below. This includes our plan of care. If you have any questions or concerns regarding these findings, please do not hesitate to contact me at the office phone number checked above. Thank you for the referral.       Physician Signature:_______________________________Date:__________________  By signing above (or electronic signature), therapists plan is approved by physician      Patient: En Ayers   : 1980   MRN: [id number]  Referring Physician:  Dr Rd Edmond      Evaluation Date: 2021      Medical Diagnosis Information:    M25.561 (ICD-10-CM) - Right knee pain, unspecified chronicity                                             Insurance information:  [employer]     Precautions/ Contra-indications: none      C-SSRS Triggered by Intake questionnaire (Past 2 wk assessment):   [x] No, Questionnaire did not trigger screening.   [] Yes, Patient intake triggered further evaluation      [] C-SSRS Screening completed  [] PCP notified via Plan of Care  [] Emergency services notified     Latex Allergy:  [x]NO      []YES  Preferred Language for Healthcare:   [x]English       []other:    SUBJECTIVE: Patient states she fell on it a few years ago without seeing a MD.  Dr Madan Bell wanted to do surgery.     Relevant Medical History:HTN, anxiety, depression  Functional Disability Index:  LEFS 60%     Pain Scale: 2-10/10  Easing factors:  Meds, rest  Provocative factors:  Stairs ladder lifting      Type: []Constant   []Intermittent []Radiating []Localized []other:     Numbness/Tingling: none    Occupation/School:[employer]    Living Status/Prior Level of Function: Independent with ADLs and IADLs,     OBJECTIVE:     ROM LEFT RIGHT   HIP Flex     HIP Abd     HIP Ext     HIP IR     HIP ER     Knee ext +2    Knee Flex 133    Ankle PF     Ankle DF     Ankle In     Ankle Ev     Strength  LEFT RIGHT   HIP Flexors 5-    HIP Abductors     HIP Ext     Hip ER     Knee EXT (quad) 5-    Knee Flex (HS) 5-    Ankle DF 5    Ankle PF     Ankle Inv     Ankle EV          Circumference  Mid apex  7 cm prox             Reflexes/Sensation:    [x]Dermatomes/Myotomes intact    [x]Reflexes equal and normal bilaterally   []Other:    Joint mobility:    [x]Normal    []Hypo   []Hyper    Palpation: Tenderness patella and medial joint line    Functional Mobility/Transfers: WFL     Posture: WFL    Bandages/Dressings/Incisions: intact    Gait: (include devices/WB status) WFL     Orthopedic Special Tests: grind +                       [x] Patient history, allergies, meds reviewed. Medical chart reviewed. See intake form. Review Of Systems (ROS):  [x]Performed Review of systems (Integumentary, CardioPulmonary, Neurological) by intake and observation. Intake form has been scanned into medical record. Patient has been instructed to contact their primary care physician regarding ROS issues if not already being addressed at this time.       Co-morbidities/Complexities (which will affect course of rehabilitation):   []None           Arthritic conditions   []Rheumatoid arthritis (M05.9)  []Osteoarthritis (M19.91)   Cardiovascular conditions   [x]Hypertension (I10)  []Hyperlipidemia (E78.5)  []Angina pectoris (I20)  []Atherosclerosis (I70)   Musculoskeletal conditions   []Disc pathology   []Congenital spine pathologies   []Prior surgical intervention  []Osteoporosis (M81.8)  []Osteopenia (M85.8)   Endocrine conditions   []Hypothyroid (E03.9)  []Hyperthyroid Gastrointestinal conditions []Constipation (H07.78)   Metabolic conditions   []Morbid obesity (E66.01)  []Diabetes type 1(E10.65) or 2 (E11.65)   []Neuropathy (G60.9)     Pulmonary conditions   []Asthma (J45)  []Coughing   []COPD (J44.9)   Psychological Disorders  [x]Anxiety (F41.9)  [x]Depression (F32.9)   []Other:   []Other:          Barriers to/and or personal factors that will affect rehab potential:              []Age  []Sex              []Motivation/Lack of Motivation                        []Co-Morbidities              []Cognitive Function, education/learning barriers              []Environmental, home barriers              []profession/work barriers  []past PT/medical experience  []other:  Justification:     Falls Risk Assessment (30 days):   [x] Falls Risk assessed and no intervention required.   [] Falls Risk assessed and Patient requires intervention due to being higher risk   TUG score (>12s at risk):     [] Falls education provided, including       G-Codes:       ASSESSMENT:   Functional Impairments:     []Noted lumbar/proximal hip/LE joint hypomobility   [x]Decreased LE functional ROM   [x]Decreased core/proximal hip strength and neuromuscular control   [x]Decreased LE functional strength   [x]Reduced balance/proprioceptive control   []other:      Functional Activity Limitations (from functional questionnaire and intake)   [x]Reduced ability to tolerate prolonged functional positions   [x]Reduced ability or difficulty with changes of positions or transfers between positions   []Reduced ability to maintain good posture and demonstrate good body mechanics with sitting, bending, and lifting   [x]Reduced ability to sleep   [x] Reduced ability or tolerance with driving and/or computer work   [x]Reduced ability to perform lifting, carrying tasks   [x]Reduced ability to squat   []Reduced ability to forward bend   [x]Reduced ability to ambulate prolonged functional periods/distances/surfaces   [x]Reduced ability to ascend/descend stairs   [x]Reduced ability to run, hop, cut or jump   []other:    Participation Restrictions   [x]Reduced participation in self care activities   [x]Reduced participation in home management activities   [x]Reduced participation in work activities   []Reduced participation in social activities. []Reduced participation in sport/recreation activities. Classification :    []Signs/symptoms consistent with post-surgical status including decreased ROM, strength and function.    []Signs/symptoms consistent with joint sprain/strain   [x]Signs/symptoms consistent with patella-femoral syndrome   []Signs/symptoms consistent with knee OA/hip OA   []Signs/symptoms consistent with internal derangement of knee/Hip   []Signs/symptoms consistent with functional hip weakness/NMR control      []Signs/symptoms consistent with tendinitis/tendinosis    []signs/symptoms consistent with pathology which may benefit from Dry needling      []other:      Prognosis/Rehab Potential:      []Excellent   [x]Good    []Fair   []Poor    Tolerance of evaluation/treatment:    []Excellent   [x]Good    []Fair   []Poor    Physical Therapy Evaluation Complexity Justification  [x] A history of present problem with:  [] no personal factors and/or comorbidities that impact the plan of care;  []1-2 personal factors and/or comorbidities that impact the plan of care  []3 personal factors and/or comorbidities that impact the plan of care  [x] An examination of body systems using standardized tests and measures addressing any of the following: body structures and functions (impairments), activity limitations, and/or participation restrictions;:  [x] a total of 1-2 or more elements   [] a total of 3 or more elements   [] a total of 4 or more elements   [x] A clinical presentation with:  [x] stable and/or uncomplicated characteristics   [] evolving clinical presentation with changing characteristics  [] unstable and unpredictable characteristics;   [x] Clinical decision making of [x] low, [] moderate, [] high complexity using standardized patient assessment instrument and/or measurable assessment of functional outcome. [x] EVAL (LOW) 78168 (typically 20 minutes face-to-face)  [] EVAL (MOD) 64137 (typically 30 minutes face-to-face)  [] EVAL (HIGH) 82045 (typically 45 minutes face-to-face)  [] RE-EVAL     PLAN:   Frequency/Duration:  1-2 days per week for 6-8  Weeks:  Interventions:  [x]  Therapeutic exercise including: strength training, ROM, for Lower extremity and core   [x]  NMR activation and proprioception for LE, Glutes and Core   [x]  Manual therapy as indicated for LE, Hip and spine to include: Dry Needling/IASTM, STM, PROM, Gr I-IV mobilizations, manipulation. [x] Modalities as needed that may include: thermal agents, E-stim, Biofeedback, US, iontophoresis as indicated  [x] Patient education on joint protection, postural re-education, activity modification, progression of HEP. HEP instruction: Refer to [address] [place] [address] access code and exercises on the 1st visit treatment note    GOALS:  Patient stated goal: Stairs    Therapist goals for Patient:   Short Term Goals: To be achieved in: 2 weeks  1. Independent in HEP and progression per patient tolerance, in order to prevent re-injury. [x] Progressing: [] Met: [] Not Met: [] Adjusted     2. Patient will have a decrease in pain to facilitate improvement in movement, function, and ADLs as indicated by Functional Deficits. [x] Progressing: [] Met: [] Not Met: [] Adjusted     Long Term Goals: To be achieved in: 6-8 weeks  1. Disability index score of 30% or less for the LEFS to assist with reaching prior level of function. [x] Progressing: [] Met: [] Not Met: [] Adjusted     2. Patient will demonstrate increased AROM to [facility] to allow for proper joint functioning as indicated by patients Functional Deficits. [x] Progressing: [] Met: [] Not Met: [] Adjusted     3.  Patient will demonstrate an increase in Strength to good proximal hip strength and control, within 5lb HHD in LE to allow for proper functional mobility as indicated by patients Functional Deficits. [x] Progressing: [] Met: [] Not Met: [] Adjusted     4. Patient will return to [facility] for  functional activities without increased symptoms or restriction. [x] Progressing: [] Met: [] Not Met: [] Adjusted     5.  Stairs with min to no limitations (patient specific functional goal)    [x] Progressing: [] Met: [] Not Met: [] Adjusted      Electronically signed by:  Robin Baires, PT

## 2021-04-13 NOTE — FLOWSHEET NOTE
[address] [facility], [facility] [address], [address] [place] [address] [address]  Phone: [phone]   Fax:     [phone]      Physical Therapy Treatment Note/ Progress Report:     Date:  2021    Patient Name:  Josué Garnett    :  1980  MRN: [id number]  Restrictions/Precautions:    Medical/Treatment Diagnosis Information:  ·   M25.561 (ICD-10-CM) - Right knee pain, unspecified chronicity  ·    Insurance/Certification information:   [facility]  Physician Information:   Dr Kale Miranda  Has the plan of care been signed (Y/N):        []  Yes  [x]  No     Date of Patient follow up with Physician: NS    Is this a Progress Report:     []  Yes  [x]  No      If Yes:  Date Range for reporting period:  Beginnin21 ------------ Endin21    Progress report will be due (10 Rx or 30 days whichever is less):        Recertification will be due (POC Duration  / 90 days whichever is less): 21        Visit # Insurance Allowable Auth Required   In Person 1 auth []  Yes     []  No    Tele Health 0  []  Yes     []  No    Total 1       Functional Scale: LEFS 60%    Date assessed:  21      Latex Allergy:  [x]NO      []YES  Preferred Language for Healthcare:   [x]English       []other:    Pain level:  2-10/10     SUBJECTIVE:  See eval    OBJECTIVE: See eval   Observation:    Test measurements:      RESTRICTIONS/PRECAUTIONS: none    Exercises/Interventions:   Therapeutic Ex (19354) Sets/sec Reps Notes/CUES HEP   QS with towel 10 10  x   SLR 10 10x  x   Piriformis stretch 20 3x  x   PB supine clamshells BL 15x  x                 bike  NV     [facility]  NV     LP  NV     Slant board  NV                   Manual Intervention (99439)       Pat mob/ knee flex  8 min                                        NMR re-education (25996)   CUES NEEDED    Wall squat  NV     LSD 2\" 10x  x   Step up and overs 2\" 10x  x   PB TKE BL 15x  x promoting relaxation, increasing ROM, reducing/eliminating soft tissue swelling/inflammation/restriction, improving soft tissue extensibility and allowing for proper ROM for normal function with self-care, mobility, lifting and ambulation. Modalities:     [x] GAME READY (VASO)- for significant edema, swelling, pain control. Charges:  Timed Code Treatment Minutes: 25   Total Treatment Minutes:  55   BWC:  TE TIME:  NMR TIME:  MANUAL TIME:  UNTIMED MINUTES:  Medicare Total:         [x] EVAL (LOW) 67427 (typically 20 minutes face-to-face)  [] EVAL (MOD) 20832 (typically 30 minutes face-to-face)  [] EVAL (HIGH) 21973 (typically 45 minutes face-to-face)  [] RE-EVAL     [x] WH(29847) x  1   [] IONTO  [] NMR (93854) x     [x] VASO  [x] Manual (51681) x  1   [] Other:  [] TA x      [] Mech Traction (47878)  [] ES(attended) (94282)      [] ES (un) (78642):    ASSESSMENT:  See eval    GOALS:    Patient stated goal: Stairs    Therapist goals for Patient:   Short Term Goals: To be achieved in: 2 weeks  1. Independent in HEP and progression per patient tolerance, in order to prevent re-injury. [x] Progressing: [] Met: [] Not Met: [] Adjusted     2. Patient will have a decrease in pain to facilitate improvement in movement, function, and ADLs as indicated by Functional Deficits. [x] Progressing: [] Met: [] Not Met: [] Adjusted     Long Term Goals: To be achieved in: 6-8 weeks  1. Disability index score of 30% or less for the LEFS to assist with reaching prior level of function. [x] Progressing: [] Met: [] Not Met: [] Adjusted     2. Patient will demonstrate increased AROM to [facility] to allow for proper joint functioning as indicated by patients Functional Deficits. [x] Progressing: [] Met: [] Not Met: [] Adjusted     3. Patient will demonstrate an increase in Strength to good proximal hip strength and control, within 5lb HHD in LE to allow for proper functional mobility as indicated by patients Functional Deficits. [x] Progressing: [] Met: [] Not Met: [] Adjusted     4. Patient will return to [facility] for  functional activities without increased symptoms or restriction. [x] Progressing: [] Met: [] Not Met: [] Adjusted     5. Stairs with min to no limitations (patient specific functional goal)    [x] Progressing: [] Met: [] Not Met: [] Adjusted            Overall Progression Towards Functional goals/ Treatment Progress Update:  [] Patient is progressing as expected towards functional goals listed. [] Progression is slowed due to complexities/Impairments listed. [] Progression has been slowed due to co-morbidities. [x] Plan just implemented, too soon to assess goals progression <30days   [] Goals require adjustment due to lack of progress  [] Patient is not progressing as expected and requires additional follow up with physician  [] Other    Prognosis for POC: [x] Good [] Fair  [] Poor      Patient requires continued skilled intervention: [x] Yes  [] No    Treatment/Activity Tolerance:  [x] Patient able to complete treatment  [] Patient limited by fatigue  [] Patient limited by pain    [] Patient limited by other medical complications  [] Other:     Return to Play: (if applicable)   [x]  Stage 1: Intro to Strength   []  Stage 2: Return to Run and Strength   []  Stage 3: Return to Jump and Strength   []  Stage 4: Dynamic Strength and Agility   []  Stage 5: Sport Specific Training     []  Ready to Return to Play, Meets All Above Stages   []  Not Ready for Return to Sports   Comments:                          PLAN: See eval  [] Continue per plan of care [] Alter current plan (see comments above)  [x] Plan of care initiated [] Hold pending MD visit [] Discharge    Electronically signed by:  Sharan Chin PT    Note: If patient does not return for scheduled/ recommended follow up visits, this note will serve as a discharge from care along with most recent update on progress.

## 2021-04-14 ENCOUNTER — HOSPITAL ENCOUNTER (OUTPATIENT)
Dept: PHYSICAL THERAPY | Age: 41
Setting detail: THERAPIES SERIES
Discharge: HOME | End: 2021-04-14
Payer: COMMERCIAL

## 2021-04-14 PROCEDURE — 97110 THERAPEUTIC EXERCISES: CPT | Performed by: SPECIALIST

## 2021-04-14 PROCEDURE — 97161 PT EVAL LOW COMPLEX 20 MIN: CPT | Performed by: SPECIALIST

## 2021-04-14 PROCEDURE — 97016 VASOPNEUMATIC DEVICE THERAPY: CPT | Performed by: SPECIALIST

## 2021-04-14 PROCEDURE — 97140 MANUAL THERAPY 1/> REGIONS: CPT | Performed by: SPECIALIST

## 2021-04-19 ENCOUNTER — TELEPHONE (OUTPATIENT)
Dept: FAMILY MEDICINE CLINIC | Age: 41
End: 2021-04-19

## 2021-04-19 NOTE — TELEPHONE ENCOUNTER
----- Message from Sravan Lockhart sent at 4/17/2021 11:15 AM EDT -----  Subject: Appointment Request    Reason for Call: Routine Pre-Op    QUESTIONS  Type of Appointment? Established Patient  Reason for appointment request? No appointments available during search  Additional Information for Provider? pt has prolapse sx. on 5/5/21. no   appts show n with Norma Senters until 5/5/21. Please call pt. to set up pre-op.  ---------------------------------------------------------------------------  --------------  CALL BACK INFO  What is the best way for the office to contact you? OK to leave message on   voicemail  Preferred Call Back Phone Number? [phone]  ---------------------------------------------------------------------------  --------------  SCRIPT ANSWERS  Relationship to Patient? Self  Appointment reason? Symptomatic  Select script based on patient symptoms? Adult Pre-Op   Do you have question for your provider that need to be answered prior to   scheduling your pre-op appointment? No  Have you been diagnosed with, tested for, or told that you are suspected   of having COVID-19 (Coronavirus)? Yes  Did your symptoms begin or have you been tested for COVID-19 in the last   10 days? No  Have you had a fever or taken medication to treat a fever within the past   3 days? No  Have you had a cough, shortness of breath or flu-like symptoms within the   past 3 days? No  Do you currently have flu-like symptoms including fever or chills, cough,   shortness of breath, or difficulty breathing, or new loss of taste or   smell? No  (Service Expert  click yes below to proceed with iConclude As Usual   Scheduling)?  Yes

## 2021-04-20 ENCOUNTER — HOSPITAL ENCOUNTER (OUTPATIENT)
Dept: PHYSICAL THERAPY | Age: 41
Setting detail: THERAPIES SERIES
Discharge: HOME | End: 2021-04-20
Payer: COMMERCIAL

## 2021-04-20 ENCOUNTER — TELEPHONE (OUTPATIENT)
Dept: FAMILY MEDICINE CLINIC | Age: 41
End: 2021-04-20

## 2021-04-20 PROCEDURE — 97112 NEUROMUSCULAR REEDUCATION: CPT | Performed by: SPECIALIST

## 2021-04-20 PROCEDURE — 97110 THERAPEUTIC EXERCISES: CPT | Performed by: SPECIALIST

## 2021-04-20 PROCEDURE — 97016 VASOPNEUMATIC DEVICE THERAPY: CPT | Performed by: SPECIALIST

## 2021-04-20 PROCEDURE — 97140 MANUAL THERAPY 1/> REGIONS: CPT | Performed by: SPECIALIST

## 2021-04-20 NOTE — TELEPHONE ENCOUNTER
----- Message from Perry Sykes sent at 4/20/2021  8:03 AM EDT -----  Subject: Appointment Request    Reason for Call: Routine Pre-Op    QUESTIONS  Type of Appointment? Established Patient  Reason for appointment request? Available appointments did not meet   patient need  Additional Information for Provider? Patient would like to reschedule 4/28   Pre-op to the the 29th of April, due to childcare.  ---------------------------------------------------------------------------  --------------  [address]  What is the best way for the office to contact you? OK to respond with   electronic message via [employer] portal (only for patients who have   registered [employer] account)  Preferred Call Back Phone Number? [phone]  ---------------------------------------------------------------------------  --------------  SCRIPT ANSWERS  Relationship to Patient? Self  Have your symptoms changed? No  Appointment reason? Symptomatic  Select script based on patient symptoms? Adult Pre-Op   Do you have question for your provider that need to be answered prior to   scheduling your pre-op appointment? No  Have you been diagnosed with, tested for, or told that you are suspected   of having COVID-19 (Coronavirus)? Yes  Did your symptoms begin or have you been tested for COVID-19 in the last   10 days? No  Have you had a fever or taken medication to treat a fever within the past   3 days? No  Have you had a cough, shortness of breath or flu-like symptoms within the   past 3 days? No  Do you currently have flu-like symptoms including fever or chills, cough,   shortness of breath, or difficulty breathing, or new loss of taste or   smell? No  (Service Expert  click yes below to proceed with Qnary As Usual   Scheduling)?  Yes

## 2021-04-20 NOTE — FLOWSHEET NOTE
723 [facility], [address] [place] [address], [address] [place] [address] [address]  Phone: [phone]   Fax:     [phone]      Physical Therapy Treatment Note/ Progress Report:     Date:  2021    Patient Name:  Maria Victoria Calles    :  1980  MRN: [id number]  Restrictions/Precautions:    Medical/Treatment Diagnosis Information:  ·   M25.561 (ICD-10-CM) - Right knee pain, unspecified chronicity     Insurance/Certification information:   [facility]  Physician Information:   Dr Ry España  Has the plan of care been signed (Y/N):        []  Yes  [x]  No     Date of Patient follow up with Physician: NS    Is this a Progress Report:     []  Yes  [x]  No      If Yes:  Date Range for reporting period:  Beginnin21 ------------ Endin21    Progress report will be due (10 Rx or 30 days whichever is less):        Recertification will be due (POC Duration  / 90 days whichever is less): 21        Visit # Insurance Allowable Auth Required   In Person 2 auth []  Yes     []  No    Tele Health 0  []  Yes     []  No    Total 2       Functional Scale: LEFS 60%    Date assessed:  21      Latex Allergy:  [x]NO      []YES  Preferred Language for Healthcare:   [x]English       []other:    Pain level:  4/10     SUBJECTIVE:  Increased pain at work    OBJECTIVE: See eval   Observation:    Test measurements:     : R knee flex  133    RESTRICTIONS/PRECAUTIONS: none    Exercises/Interventions:   Therapeutic Ex (93070) Sets/sec Reps Notes/CUES HEP   QS with towel 10 10  x   SLR 10 10x  x   Piriformis stretch 20 3x  x   PB supine clamshells BL 15x  x                 bike  5 min     [facility] [place]  Flex/ add/ abd  30# 15x     # 25x      Slant board  1 min     Knee machine flex/ext  NV            Manual Intervention ([phone])       Pat mob/ knee flex  8 min                                        NMR re-education (43297)   CUES NEEDED    Wall squat  10x     LSD 2\" 15x  x   Step up and overs 2\" 15x  x   PB TKE BL 15x  x                                      Therapeutic Activity (67030)                     vaso  10 min                   Captalis access code: [email or web address]              Therapeutic Exercise and NMR EXR  [x] (17398) Provided verbal/tactile cueing for activities related to strengthening, flexibility, endurance, ROM for improvements in LE, proximal hip, and core control with self care, mobility, lifting, ambulation. [x] (53037) Provided verbal/tactile cueing for activities related to improving balance, coordination, kinesthetic sense, posture, motor skill, proprioception to assist with LE, proximal hip, and core control in self-care, mobility, lifting, ambulation and eccentric single leg control.      NMR and Therapeutic Activities:    [x] (03554 or 89267) Provided verbal/tactile cueing for activities related to improving balance, coordination, kinesthetic sense, posture, motor skill, proprioception and motor activation to allow for proper function of core, proximal hip and LE with self-care and ADLs and functional mobility.   [] (83937) Gait Re-education- Provided training and instruction to the patient for proper LE, core and proximal hip recruitment and positioning and eccentric body weight control with ambulation re-education including up and down stairs     Home Exercise Program:    [x] (25133) Reviewed/Progressed HEP activities related to strengthening, flexibility, endurance, ROM of core, proximal hip and LE for functional self-care, mobility, lifting and ambulation/stair navigation   [] (72510) Reviewed/Progressed HEP activities related to improving balance, coordination, kinesthetic sense, posture, motor skill, proprioception of core, proximal hip and LE for self-care, mobility, lifting, and ambulation/stair navigation      Manual Treatments:  PROM / STM / Oscillations-Mobs:  G-I, II, III, IV (PA's, Inf., Post.)  [x] (07742) Provided manual therapy to mobilize LE, proximal hip and/or LS spine soft tissue/joints for the purpose of modulating pain, promoting relaxation, increasing ROM, reducing/eliminating soft tissue swelling/inflammation/restriction, improving soft tissue extensibility and allowing for proper ROM for normal function with self-care, mobility, lifting and ambulation. Modalities:     [x] GAME READY (VASO)- for significant edema, swelling, pain control. Charges:  Timed Code Treatment Minutes: 38   Total Treatment Minutes:  48   BWC:  TE TIME:  NMR TIME:  MANUAL TIME:  UNTIMED MINUTES:  Medicare Total:         [] EVAL (LOW) 62885 (typically 20 minutes face-to-face)  [] EVAL (MOD) 72038 (typically 30 minutes face-to-face)  [] EVAL (HIGH) 03570 (typically 45 minutes face-to-face)  [] RE-EVAL     [x] GY(61579) x  1   [] IONTO  [x] NMR (33742) x  1   [x] VASO  [x] Manual (62324) x  1   [] Other:  [] TA x      [] Mech Traction (59316)  [] ES(attended) (04866)      [] ES (un) (72839):    ASSESSMENT:  Good tolerance with Marry and manuals    GOALS:    Patient stated goal: Stairs    Therapist goals for Patient:   Short Term Goals: To be achieved in: 2 weeks  1. Independent in HEP and progression per patient tolerance, in order to prevent re-injury. [x] Progressing: [] Met: [] Not Met: [] Adjusted     2. Patient will have a decrease in pain to facilitate improvement in movement, function, and ADLs as indicated by Functional Deficits. [x] Progressing: [] Met: [] Not Met: [] Adjusted     Long Term Goals: To be achieved in: 6-8 weeks  1. Disability index score of 30% or less for the LEFS to assist with reaching prior level of function. [x] Progressing: [] Met: [] Not Met: [] Adjusted     2. Patient will demonstrate increased AROM to [facility] to allow for proper joint functioning as indicated by patients Functional Deficits. [x] Progressing: [] Met: [] Not Met: [] Adjusted     3.  Patient will demonstrate an increase in Strength to good proximal hip strength and control, within 5lb HHD in LE to allow for proper functional mobility as indicated by patients Functional Deficits. [x] Progressing: [] Met: [] Not Met: [] Adjusted     4. Patient will return to [facility] for  functional activities without increased symptoms or restriction. [x] Progressing: [] Met: [] Not Met: [] Adjusted     5. Stairs with min to no limitations (patient specific functional goal)    [x] Progressing: [] Met: [] Not Met: [] Adjusted            Overall Progression Towards Functional goals/ Treatment Progress Update:  [x] Patient is progressing as expected towards functional goals listed. [] Progression is slowed due to complexities/Impairments listed. [] Progression has been slowed due to co-morbidities.   [] Plan just implemented, too soon to assess goals progression <30days   [] Goals require adjustment due to lack of progress  [] Patient is not progressing as expected and requires additional follow up with physician  [] Other    Prognosis for POC: [x] Good [] Fair  [] Poor      Patient requires continued skilled intervention: [x] Yes  [] No    Treatment/Activity Tolerance:  [x] Patient able to complete treatment  [] Patient limited by fatigue  [] Patient limited by pain    [] Patient limited by other medical complications  [] Other:     Return to Play: (if applicable)   [x]  Stage 1: Intro to Strength   []  Stage 2: Return to Run and Strength   []  Stage 3: Return to Jump and Strength   []  Stage 4: Dynamic Strength and Agility   []  Stage 5: Sport Specific Training     []  Ready to Return to Play, Meets All Above Stages   []  Not Ready for Return to Sports   Comments:                          PLAN:   [x] Continue per plan of care [] Alter current plan (see comments above)  [] Plan of care initiated [] Hold pending MD visit [] Discharge    Electronically signed by:  Cristina Reilly PT    Note: If patient does not return for scheduled/ recommended follow up visits, this note will serve as a discharge from care along with most recent update on progress.

## 2021-04-22 ENCOUNTER — HOSPITAL ENCOUNTER (OUTPATIENT)
Dept: PHYSICAL THERAPY | Age: 41
Setting detail: THERAPIES SERIES
Discharge: HOME | End: 2021-04-22
Payer: COMMERCIAL

## 2021-04-22 DIAGNOSIS — S83.511D RUPTURE OF ANTERIOR CRUCIATE LIGAMENT OF RIGHT KNEE, SUBSEQUENT ENCOUNTER: ICD-10-CM

## 2021-04-22 DIAGNOSIS — M25.561 RIGHT KNEE PAIN, UNSPECIFIED CHRONICITY: ICD-10-CM

## 2021-04-22 NOTE — FLOWSHEET NOTE
164 [facility], [address] [place] [address], [address] [place] [address] [address]  Phone: [phone]   Fax:     [phone]    Physical Therapy  Cancellation/No-show Note  Patient Name:  Buzz Guardado  :  1980   Date:  2021    Cancelled visits to date: 1  No-shows to date: 1    For today's appointment patient:  []  Cancelled  []  Rescheduled appointment  [x]  No-show     Reason given by patient:  []  Patient ill  []  Conflicting appointment  []  No transportation    []  Conflict with work  []  No reason given  []  Other:     Comments:      Phone call information:   []  Phone call made today to patient at _ time at number provided:      []  Patient answered, conversation as follows:    []  Patient did not answer, message left as follows:  []  Phone call not made today  []  Phone call not needed - pt contacted us to cancel and provided reason for cancellation.      Electronically signed by:  Megha Osei PT

## 2021-04-30 ENCOUNTER — OFFICE VISIT (OUTPATIENT)
Dept: FAMILY MEDICINE CLINIC | Age: 41
End: 2021-04-30
Payer: COMMERCIAL

## 2021-04-30 VITALS
OXYGEN SATURATION: 98 % | SYSTOLIC BLOOD PRESSURE: 116 MMHG | RESPIRATION RATE: 16 BRPM | WEIGHT: 232.6 LBS | TEMPERATURE: 98.2 F | DIASTOLIC BLOOD PRESSURE: 82 MMHG | HEIGHT: 67 IN | HEART RATE: 75 BPM | BODY MASS INDEX: 36.51 KG/M2

## 2021-04-30 DIAGNOSIS — Z01.818 PRE-OP EXAM: ICD-10-CM

## 2021-04-30 DIAGNOSIS — N81.6 RECTOCELE: ICD-10-CM

## 2021-04-30 DIAGNOSIS — I10 ESSENTIAL HYPERTENSION: Primary | ICD-10-CM

## 2021-04-30 DIAGNOSIS — M25.561 RIGHT KNEE PAIN, UNSPECIFIED CHRONICITY: ICD-10-CM

## 2021-04-30 LAB
A/G RATIO: 1.6 (ref 1.1–2.2)
ALBUMIN SERPL-MCNC: 4.3 G/DL (ref 3.4–5)
ALP BLD-CCNC: 73 U/L (ref 40–129)
ALT SERPL-CCNC: 16 U/L (ref 10–40)
ANION GAP SERPL CALCULATED.3IONS-SCNC: 10 MMOL/L (ref 3–16)
AST SERPL-CCNC: 14 U/L (ref 15–37)
BASOPHILS ABSOLUTE: 0.1 K/UL (ref 0–0.2)
BASOPHILS RELATIVE PERCENT: 0.7 %
BILIRUB SERPL-MCNC: 0.3 MG/DL (ref 0–1)
BUN BLDV-MCNC: 9 MG/DL (ref 7–20)
CALCIUM SERPL-MCNC: 8.9 MG/DL (ref 8.3–10.6)
CHLORIDE BLD-SCNC: 106 MMOL/L (ref 99–110)
CO2: 25 MMOL/L (ref 21–32)
CREAT SERPL-MCNC: 0.6 MG/DL (ref 0.6–1.1)
EOSINOPHILS ABSOLUTE: 0.1 K/UL (ref 0–0.6)
EOSINOPHILS RELATIVE PERCENT: 1.5 %
GFR AFRICAN AMERICAN: >60
GFR NON-AFRICAN AMERICAN: >60
GLOBULIN: 2.7 G/DL
GLUCOSE BLD-MCNC: 82 MG/DL (ref 70–99)
HCT VFR BLD CALC: 40.4 % (ref 36–48)
HEMOGLOBIN: 13.7 G/DL (ref 12–16)
LYMPHOCYTES ABSOLUTE: 2.4 K/UL (ref 1–5.1)
LYMPHOCYTES RELATIVE PERCENT: 26.5 %
MCH RBC QN AUTO: 30.3 PG (ref 26–34)
MCHC RBC AUTO-ENTMCNC: 33.8 G/DL (ref 31–36)
MCV RBC AUTO: 89.6 FL (ref 80–100)
MONOCYTES ABSOLUTE: 0.6 K/UL (ref 0–1.3)
MONOCYTES RELATIVE PERCENT: 6.7 %
NEUTROPHILS ABSOLUTE: 5.8 K/UL (ref 1.7–7.7)
NEUTROPHILS RELATIVE PERCENT: 64.6 %
PDW BLD-RTO: 13.3 % (ref 12.4–15.4)
PLATELET # BLD: 334 K/UL (ref 135–450)
PMV BLD AUTO: 8.1 FL (ref 5–10.5)
POTASSIUM REFLEX MAGNESIUM: 4.4 MMOL/L (ref 3.5–5.1)
RBC # BLD: 4.51 M/UL (ref 4–5.2)
SODIUM BLD-SCNC: 141 MMOL/L (ref 136–145)
TOTAL PROTEIN: 7 G/DL (ref 6.4–8.2)
WBC # BLD: 9 K/UL (ref 4–11)

## 2021-04-30 PROCEDURE — 93000 ELECTROCARDIOGRAM COMPLETE: CPT | Performed by: NURSE PRACTITIONER

## 2021-04-30 PROCEDURE — 1036F TOBACCO NON-USER: CPT | Performed by: NURSE PRACTITIONER

## 2021-04-30 PROCEDURE — L1845 KO DOUBLE UPRIGHT PRE CST: HCPCS | Performed by: ORTHOPAEDIC SURGERY

## 2021-04-30 PROCEDURE — G8417 CALC BMI ABV UP PARAM F/U: HCPCS | Performed by: NURSE PRACTITIONER

## 2021-04-30 PROCEDURE — 99214 OFFICE O/P EST MOD 30 MIN: CPT | Performed by: NURSE PRACTITIONER

## 2021-04-30 PROCEDURE — G8427 DOCREV CUR MEDS BY ELIG CLIN: HCPCS | Performed by: NURSE PRACTITIONER

## 2021-04-30 ASSESSMENT — PATIENT HEALTH QUESTIONNAIRE - PHQ9
SUM OF ALL RESPONSES TO PHQ9 QUESTIONS 1 & 2: 0
2. FEELING DOWN, DEPRESSED OR HOPELESS: 0
SUM OF ALL RESPONSES TO PHQ QUESTIONS 1-9: 0

## 2021-04-30 ASSESSMENT — ENCOUNTER SYMPTOMS
NAUSEA: 0
DIARRHEA: 0
TROUBLE SWALLOWING: 0
BACK PAIN: 0
CHEST TIGHTNESS: 0
CONSTIPATION: 0

## 2021-04-30 NOTE — PROGRESS NOTES
Chondromalacia of right patella     Depression     Essential hypertension 8/5/2020    Gastroesophageal reflux disease without esophagitis 7/12/2017    Irritable bowel syndrome without diarrhea 8/9/2017       Past Surgical History:   Procedure Laterality Date    CHOLECYSTECTOMY  2003    OVARIAN CYST REMOVAL Bilateral 2/19/2020    LAPAROSCOPIC BILATERAL OVARIAN CYSTECTOMY performed by pSeedy Verduzco MD at [address] [place] [address] TUMOR SOFT TISSUE BACK/FLANK SUBQ <3CM N/A 7/16/2019    WIDE LOCAL EXCISION BACK LESION performed by Dang Xiao MD at [place] [address] History     Socioeconomic History    Marital status: Single     Spouse name: Not on file    Number of children: Not on file    Years of education: Not on file    Highest education level: Not on file   Occupational History    Not on file   Social Needs    Financial resource strain: Not on file    Food insecurity     Worry: Not on file     Inability: Not on file    Transportation needs     Medical: Not on file     Non-medical: Not on file   Tobacco Use    Smoking status: Never Smoker    Smokeless tobacco: Never Used   Substance and Sexual Activity    Alcohol use: No    Drug use: No    Sexual activity: Yes     Partners: Male     Birth control/protection: I.U.D.    Lifestyle    Physical activity     Days per week: Not on file     Minutes per session: Not on file    Stress: Not on file   Relationships    Social connections     Talks on phone: Not on file     Gets together: Not on file     Attends Latter-day service: Not on file     Active member of club or organization: Not on file     Attends meetings of clubs or organizations: Not on file     Relationship status: Not on file    Intimate partner violence     Fear of current or ex partner: Not on file     Emotionally abused: Not on file     Physically abused: Not on file     Forced sexual activity: Not on file   Other Topics Concern    Not on file   Social History Narrative    Not on file        Family History   Problem Relation Age of Onset    Diabetes Mother     Depression Mother     High Blood Pressure Mother     High Cholesterol Mother     High Blood Pressure Father     No Known Problems Brother        ADVANCE DIRECTIVE: N, <no information>    Vitals:    04/30/21 1049   BP: 116/82   Site: Right Upper Arm   Position: Sitting   Cuff Size: Large Adult   Pulse: 75   Resp: 16   Temp: 98.2 °F (36.8 °C)   TempSrc: Oral   SpO2: 98%   Weight: 232 lb 9.6 oz (105.5 kg)   Height: 5' 7\" (1.702 m)     Estimated body mass index is 36.43 kg/m² as calculated from the following:    Height as of this encounter: 5' 7\" (1.702 m). Weight as of this encounter: 232 lb 9.6 oz (105.5 kg). Physical Exam  Constitutional:       Appearance: Normal appearance. She is well-developed. HENT:      Head: Normocephalic and atraumatic. Neck:      Musculoskeletal: Normal range of motion and neck supple. Thyroid: No thyromegaly. Vascular: No carotid bruit. Cardiovascular:      Rate and Rhythm: Normal rate and regular rhythm. Pulses: Normal pulses. Heart sounds: Normal heart sounds. Pulmonary:      Effort: Pulmonary effort is normal.      Breath sounds: Normal breath sounds. Abdominal:      General: Bowel sounds are normal.      Palpations: Abdomen is soft. Musculoskeletal: Normal range of motion. General: No swelling. Skin:     General: Skin is warm. Neurological:      Mental Status: She is alert and oriented to person, place, and time. Psychiatric:         Behavior: Behavior normal.         No flowsheet data found.     Lab Results   Component Value Date    CHOL 233 05/17/2019    CHOLFAST 229 09/28/2017    TRIG 101 05/17/2019    TRIGLYCFAST 94 09/28/2017    HDL 45 05/17/2019    HDL 42 09/28/2017    LDLCALC 168 05/17/2019    LDLCALC 168 09/28/2017    GLUF 100 09/28/2017    GLUCOSE 90 05/17/2019    LABA1C 5.3 02/05/2019       The 10-year ASCVD risk score (Tyrell Montoya, et al., 2013) is: 1.4%    Values used to calculate the score:      Age: 41 years      Sex: Female      Is Non- : No      Diabetic: No      Tobacco smoker: No      Systolic Blood Pressure: 907 mmHg      Is BP treated: Yes      HDL Cholesterol: 45 mg/dL      Total Cholesterol: 233 mg/dL    Immunization History   Administered Date(s) Administered    COVID-19, Pfizer, PF, 30mcg/0.3mL 04/10/2021    Influenza Virus Vaccine 09/10/2019    Influenza, MDCK Quadv, IM, PF (Flucelvax 4 yrs and older) 08/31/2017    Influenza, Quadv, IM, PF (6 mo and older Fluzone, Flulaval, Fluarix, and 3 yrs and older Afluria) 10/17/2019    Tdap (Boostrix, Adacel) 07/12/2017       Health Maintenance   Topic Date Due    Cervical cancer screen  08/01/2019    Potassium monitoring  05/17/2020    Creatinine monitoring  05/17/2020    COVID-19 Vaccine (2 - Pfizer 2-dose series) 05/01/2021    Flu vaccine (Season Ended) 09/01/2021    Lipid screen  05/17/2024    DTaP/Tdap/Td vaccine (2 - Td) 07/12/2027    Hepatitis C screen  Completed    HIV screen  Completed    Hepatitis A vaccine  Aged Out    Hepatitis B vaccine  Aged Out    Hib vaccine  Aged Out    Meningococcal (ACWY) vaccine  Aged Out    Pneumococcal 0-64 years Vaccine  Aged Out       ASSESSMENT/PLAN:  1. Essential hypertension  -     Comprehensive Metabolic Panel w/ Reflex to MG  2. Rectocele  -     EKG 12 Lead; Future  -     Comprehensive Metabolic Panel w/ Reflex to MG  -     CBC Auto Differential  3. Pre-op exam  -     EKG 12 Lead; Future  -     Comprehensive Metabolic Panel w/ Reflex to MG  -     CBC Auto Differential         Assessment:       41 y.o. patient with planned surgery as above. Known risk factors for perioperative complications: Hypertension     Plan:     1. Preoperative workup as follows: ECG, hemoglobin, hematocrit, electrolytes, creatinine, glucose, liver function studies  2.  Change in medication regimen before surgery: Hold all medications on morning of surgery, Discontinue ASA 7 days before surgery, Discontinue NSAIDs (ibuprofen, aleve, naprosyn) 7 days before surgery  3. Prophylaxis for cardiac events with perioperative beta-blockers: {PROPHYLAXIS   4. Deep vein thrombosis prophylaxis: regimen to be chosen by surgical team  5. No contraindications to planned surgery      EKG negative for acute changes, No previous for comparison. No follow-ups on file.     An electronic signature was used to authenticate this note.    --KRISSY WADE - CNP on 4/30/2021 at 11:22 AM

## 2021-05-01 ENCOUNTER — IMMUNIZATION (OUTPATIENT)
Dept: PRIMARY CARE CLINIC | Age: 41
End: 2021-05-01
Payer: COMMERCIAL

## 2021-05-01 PROCEDURE — 91300 COVID-19, PFIZER VACCINE 30MCG/0.3ML DOSE: CPT | Performed by: FAMILY MEDICINE

## 2021-05-01 PROCEDURE — 0002A COVID-19, PFIZER VACCINE 30MCG/0.3ML DOSE: CPT | Performed by: FAMILY MEDICINE

## 2022-01-05 ENCOUNTER — TELEMEDICINE (OUTPATIENT)
Dept: PRIMARY CARE CLINIC | Age: 42
End: 2022-01-05
Payer: COMMERCIAL

## 2022-01-05 ENCOUNTER — TELEPHONE (OUTPATIENT)
Dept: PRIMARY CARE CLINIC | Age: 42
End: 2022-01-05

## 2022-01-05 DIAGNOSIS — J06.9 VIRAL UPPER RESPIRATORY TRACT INFECTION: Primary | ICD-10-CM

## 2022-01-05 DIAGNOSIS — J02.9 PHARYNGITIS, UNSPECIFIED ETIOLOGY: ICD-10-CM

## 2022-01-05 PROCEDURE — 87804 INFLUENZA ASSAY W/OPTIC: CPT | Performed by: NURSE PRACTITIONER

## 2022-01-05 PROCEDURE — G8427 DOCREV CUR MEDS BY ELIG CLIN: HCPCS | Performed by: NURSE PRACTITIONER

## 2022-01-05 PROCEDURE — 99214 OFFICE O/P EST MOD 30 MIN: CPT | Performed by: NURSE PRACTITIONER

## 2022-01-05 PROCEDURE — 87880 STREP A ASSAY W/OPTIC: CPT | Performed by: NURSE PRACTITIONER

## 2022-01-05 ASSESSMENT — ENCOUNTER SYMPTOMS
SORE THROAT: 1
WHEEZING: 0
COUGH: 1
SINUS PRESSURE: 0
TROUBLE SWALLOWING: 0
SINUS PAIN: 0
SHORTNESS OF BREATH: 0

## 2022-01-05 NOTE — PATIENT INSTRUCTIONS
Notify office if you have no improvement or worsening of condition. Test for COVID, FLU, and STREP. Per CDC guidelines, even though you have been vaccinated, if you are exposed you should watch for symptoms and if you have symptoms, you should be tested and stay home away from others. If you test positive it is recommended you isolate at home for 10 days, wear a mask in public indoor settings for 14 days or until you receive a negative result. Recommend supplementation with Vitamin C, Vitamin D +/- Zinc supplementation, based on health status. Monitor symptoms and changes. We can take care of secondary infections if the need arises, most cases are mild and patients can manage symptoms with OTC medications ( Like Mucinex, Tylenol and/or Robitussin). If you have questions on those please do not hesitate to ask. Monitor for increased trouble breathing, chest pain, increased shortness of breath, monitor oxygen level at home (purchase pulse ox for home use) if in 80's notify office or report to ED, new confusion, inability to stay awake, blue color to lips or face should report to ED immediately. Options they will consider include stopping quarantine   After day 10 without testing   After day 7 after receiving a negative test result (test must occur on day 5 or later)  In areas using options to reduce quarantine times, people who are asymptomatic can use a negative test result collected on day five (5) after exposure to exit quarantine on day seven (7), with additional self-monitoring. The day of exposure is considered day zero (0). Patient Education        Sore Throat: Care Instructions  Your Care Instructions     Infection by bacteria or a virus causes most sore throats. Cigarette smoke, dry air, air pollution, allergies, and yelling can also cause a sore throat. Sore throats can be painful and annoying. Fortunately, most sore throats go away on their own.  If you have a bacterial infection, your doctor may prescribe antibiotics. Follow-up care is a key part of your treatment and safety. Be sure to make and go to all appointments, and call your doctor if you are having problems. It's also a good idea to know your test results and keep a list of the medicines you take. How can you care for yourself at home? · If your doctor prescribed antibiotics, take them as directed. Do not stop taking them just because you feel better. You need to take the full course of antibiotics. · Gargle with warm salt water once an hour to help reduce swelling and relieve discomfort. Use 1 teaspoon of salt mixed in 1 cup of warm water. · Take an over-the-counter pain medicine, such as acetaminophen (Tylenol), ibuprofen (Advil, Motrin), or naproxen (Aleve). Read and follow all instructions on the label. · Be careful when taking over-the-counter cold or flu medicines and Tylenol at the same time. Many of these medicines have acetaminophen, which is Tylenol. Read the labels to make sure that you are not taking more than the recommended dose. Too much acetaminophen (Tylenol) can be harmful. · Drink plenty of fluids. Fluids may help soothe an irritated throat. Hot fluids, such as tea or soup, may help decrease throat pain. · Use over-the-counter throat lozenges to soothe pain. Regular cough drops or hard candy may also help. These should not be given to young children because of the risk of choking. · Do not smoke or allow others to smoke around you. If you need help quitting, talk to your doctor about stop-smoking programs and medicines. These can increase your chances of quitting for good. · Use a vaporizer or humidifier to add moisture to your bedroom. Follow the directions for cleaning the machine. When should you call for help? Call your doctor now or seek immediate medical care if:    · You have new or worse trouble swallowing.     · Your sore throat gets much worse on one side.    Watch closely for changes in your health, and be sure to contact your doctor if you do not get better as expected. Where can you learn more? Go to [email or web address] org and sign in to your [employer]You account. Enter V899 in the [facility] box to learn more about \"Sore Throat: Care Instructions. \"     If you do not have an account, please click on the \"Sign Up Now\" link. Current as of: September 8, 2021               Content Version: 13.1  © 3694-6647 Healthwise, Incorporated. Care instructions adapted under license by [facility] ([facility]). If you have questions about a medical condition or this instruction, always ask your healthcare professional. [address] any warranty or liability for your use of this information.

## 2022-01-05 NOTE — PROGRESS NOTES
2022    TELEHEALTH EVALUATION -- Audio/Visual (During DJZUQ-61 public health emergency)    Chief Complaint   Patient presents with    Congestion    Cough    Fatigue    Pharyngitis       HPI:    Bertin Ham (:  1980) has requested an audio/video evaluation for the following concern(s):    Patient with symptoms of URI. States daughter positive with Strep today. Patient confesses to similar symptoms. + Congestion and fatigue. Vaccinated for Sukhjinder. However, about 10 people at work out with COVID at this time. She has had positive exposure. Review of Systems   Constitutional: Positive for chills and fatigue. Negative for fever. HENT: Positive for congestion and sore throat. Negative for sinus pressure, sinus pain and trouble swallowing. Respiratory: Positive for cough. Negative for shortness of breath and wheezing. Cardiovascular: Negative. Genitourinary: Negative. Musculoskeletal: Positive for myalgias. Neurological: Negative. Psychiatric/Behavioral: Negative. Prior to Visit Medications    Medication Sig Taking? Authorizing Provider   escitalopram (LEXAPRO) 20 MG tablet TAKE ONE TABLET BY MOUTH DAILY  KRISSY Sullivan CNP   hydroCHLOROthiazide (HYDRODIURIL) 25 MG tablet TAKE ONE TABLET BY MOUTH EVERY MORNING  KRISSY Sullivan CNP   meloxicam (MOBIC) 15 MG tablet TAKE ONE TABLET BY MOUTH DAILY  KRISSY Wheeler CNP   acetaminophen (TYLENOL) 325 MG tablet Take 650 mg by mouth every 6 hours as needed for Pain  Historical Provider, MD       Social History     Tobacco Use    Smoking status: Never Smoker    Smokeless tobacco: Never Used   Vaping Use    Vaping Use: Never used   Substance Use Topics    Alcohol use: No    Drug use: No        Allergies   Allergen Reactions    Other Itching and Rash     Patient states she had generalized itching all over after local anesthetic from last time this lesion was excised.    ,   Past Medical History: Diagnosis Date    Anxiety 10/18/2019    Chondromalacia of right patella     Depression     Essential hypertension 8/5/2020    Gastroesophageal reflux disease without esophagitis 7/12/2017    Irritable bowel syndrome without diarrhea 8/9/2017   ,   Social History     Tobacco Use    Smoking status: Never Smoker    Smokeless tobacco: Never Used   Vaping Use    Vaping Use: Never used   Substance Use Topics    Alcohol use: No    Drug use: No   ,   Immunization History   Administered Date(s) Administered    COVID-19, Pfizer, PF, 30mcg/0.3mL 04/10/2021, 05/01/2021    Influenza Virus Vaccine 09/10/2019    Influenza, MDCK Quadv, IM, PF (Flucelvax 2 yrs and older) 08/31/2017    Influenza, Quadv, IM, PF (6 mo and older Fluzone, Flulaval, Fluarix, and 3 yrs and older Afluria) 10/17/2019    Tdap (Boostrix, Adacel) 07/12/2017       PHYSICAL EXAMINATION:  [ INSTRUCTIONS:  \"[x]\" Indicates a positive item  \"[]\" Indicates a negative item  -- DELETE ALL ITEMS NOT EXAMINED]  Vital Signs: (As obtained by patient/caregiver or practitioner observation)    Blood pressure-  Heart rate-    Respiratory rate-    Temperature-  Pulse oximetry-     Constitutional: [x] Appears well-developed and well-nourished [x] No apparent distress      [] Abnormal-   Mental status  [x] Alert and awake  [x] Oriented to person/place/time [x]Able to follow commands      Eyes:  EOM    [x]  Normal  [] Abnormal-  Sclera  [x]  Normal  [] Abnormal -         Discharge [x]  None visible  [] Abnormal -    HENT:   [x] Normocephalic, atraumatic.   [] Abnormal   [x] Mouth/Throat: Mucous membranes are moist.     External Ears [x] Normal  [] Abnormal-     Neck: [x] No visualized mass     Pulmonary/Chest: [x] Respiratory effort normal.  [x] No visualized signs of difficulty breathing or respiratory distress        [] Abnormal-      Musculoskeletal:   [x] Normal gait with no signs of ataxia         [x] Normal range of motion of neck        [] Abnormal- Neurological:        [x] No Facial Asymmetry (Cranial nerve 7 motor function) (limited exam to video visit)          [x] No gaze palsy        [] Abnormal-         Skin:        [] No significant exanthematous lesions or discoloration noted on facial skin         [] Abnormal-            Psychiatric:       [x] Normal Affect [] No Hallucinations        [] Abnormal-     Other pertinent observable physical exam findings-  Appears ill    ASSESSMENT/PLAN:  1. Viral upper respiratory tract infection  Suspect COVID or FLU  Will test for both  Notify office if you have no improvement or worsening of condition. Per CDC guidelines, If you test positive for COVID it is recommended you isolate at home for 10 days, wear a mask in public indoor settings for 14 days or until you receive a negative result. Viral illnesses: Recommend supplementation with Vitamin C, Vitamin D +/- Zinc supplementation, based on health status. Monitor symptoms and changes. We can take care of secondary infections if the need arises, most cases are mild and patients can manage symptoms with OTC medications ( Like Mucinex, Tylenol and/or Robitussin). If you have questions on those please do not hesitate to ask. Monitor for increased trouble breathing, chest pain, increased shortness of breath, monitor oxygen level at home (purchase pulse ox for home use) if in 80's notify office or report to ED, new confusion, inability to stay awake, blue color to lips or face should report to ED immediately. - Covid-19 Ambulatory; Future  - POCT Influenza A/B  - POCT rapid strep A    2. Pharyngitis, unspecified etiology  Notify office if you have no improvement or worsening of condition. Warm saltwater gargles, Cepacol  Warm tea with honey and lemon  Rest  Plenty of fluids  Alternate tylenol and ibuprofen as directed. See above plan. - Covid-19 Ambulatory;  Future  - POCT rapid strep A      Return if symptoms worsen or fail to miguel Johnston, was evaluated through a synchronous (real-time) audio-video encounter. The patient (or guardian if applicable) is aware that this is a billable service. Verbal consent to proceed has been obtained within the past 12 months. The visit was conducted pursuant to the emergency declaration under the [address] [place] [address], [address] [place] [address] and the [employer] and [employer] General Act. Patient identification was verified, and a caregiver was present when appropriate. The patient was located in a state where the provider was credentialed to provide care. Total time spent on this encounter: 20 minutes    --KRISSY Black CNP on 1/6/2022 at 12:38 PM    An electronic signature was used to authenticate this note.

## 2022-01-06 ENCOUNTER — NURSE ONLY (OUTPATIENT)
Dept: FAMILY MEDICINE CLINIC | Age: 42
End: 2022-01-06

## 2022-01-06 DIAGNOSIS — J06.9 VIRAL UPPER RESPIRATORY TRACT INFECTION: ICD-10-CM

## 2022-01-06 DIAGNOSIS — J02.9 PHARYNGITIS, UNSPECIFIED ETIOLOGY: ICD-10-CM

## 2022-01-06 LAB
INFLUENZA A ANTIBODY: POSITIVE
INFLUENZA B ANTIBODY: NEGATIVE
S PYO AG THROAT QL: NORMAL

## 2022-01-08 LAB — SARS-COV-2: DETECTED

## 2022-01-21 NOTE — TELEPHONE ENCOUNTER
Spoke with daughter Eladia via phone for follow up anticoagulation visit.   Last INR on 1/14/22 was 6.0.  Dose maintained and two doses held.   Today's INR is 2.3 and is within goal range.    Current warfarin total weekly dose of 17 mg verified.  Informed the INR result is within therapeutic range and instructed to decrease current dose per protocol. Discussed dose and return date of 1/27/22 for next INR. See Anticoagulation flowsheet.      Pt NON-VERBAL. Fax received from [facility]. Home INR today is 2.3, in range and down from previous INR 6.0, in one week while on dose of 13mg in the last seven days with two held doses. TWD on file is 17mg/wk. Unknown reason for elevated INR. Daughter Eladia phoned and discussed pt. No new meds or diet changes. No s/s of bldg. Due to four point drop in INR instructed daughter to have Pt slightly decrease dose to 16 mg/wk. (6% decrease). Next INR on 1/27 with home INR machine.     Dr. Ayala is in the office today supervising the treatment.    Instructed to contact the clinic with any unusual bleeding or bruising, any changes in medications, diet, health status, lifestyle, or any other changes, questions or concerns. Verbalized understanding of all discussed.      Called and left a message that her MRI was approved and to call azeem nelson at [phone] to schedule mri and then call [facility] main line at [phone] to schedule f/u appointment at least 2 business days out to make sure we have result at f/u appointment.  srh

## 2022-05-07 ENCOUNTER — APPOINTMENT (OUTPATIENT)
Dept: GENERAL RADIOLOGY | Age: 42
End: 2022-05-07
Payer: COMMERCIAL

## 2022-05-07 ENCOUNTER — HOSPITAL ENCOUNTER (EMERGENCY)
Age: 42
Discharge: HOME | End: 2022-05-07
Attending: STUDENT IN AN ORGANIZED HEALTH CARE EDUCATION/TRAINING PROGRAM
Payer: COMMERCIAL

## 2022-05-07 VITALS
WEIGHT: 240 LBS | HEART RATE: 76 BPM | SYSTOLIC BLOOD PRESSURE: 127 MMHG | DIASTOLIC BLOOD PRESSURE: 71 MMHG | OXYGEN SATURATION: 97 % | RESPIRATION RATE: 18 BRPM | BODY MASS INDEX: 36.37 KG/M2 | HEIGHT: 68 IN | TEMPERATURE: 98.4 F

## 2022-05-07 DIAGNOSIS — M25.512 ACUTE PAIN OF LEFT SHOULDER: Primary | ICD-10-CM

## 2022-05-07 DIAGNOSIS — R07.9 CHEST PAIN, UNSPECIFIED TYPE: ICD-10-CM

## 2022-05-07 DIAGNOSIS — E87.6 HYPOKALEMIA: ICD-10-CM

## 2022-05-07 LAB
ANION GAP SERPL CALCULATED.3IONS-SCNC: 12 MMOL/L (ref 3–16)
BASOPHILS ABSOLUTE: 0.1 K/UL (ref 0–0.2)
BASOPHILS RELATIVE PERCENT: 0.9 %
BUN BLDV-MCNC: 11 MG/DL (ref 7–20)
CALCIUM SERPL-MCNC: 9.8 MG/DL (ref 8.3–10.6)
CHLORIDE BLD-SCNC: 94 MMOL/L (ref 99–110)
CO2: 28 MMOL/L (ref 21–32)
CREAT SERPL-MCNC: 0.7 MG/DL (ref 0.6–1.1)
EKG ATRIAL RATE: 92 BPM
EKG DIAGNOSIS: NORMAL
EKG P AXIS: 57 DEGREES
EKG P-R INTERVAL: 148 MS
EKG Q-T INTERVAL: 368 MS
EKG QRS DURATION: 86 MS
EKG QTC CALCULATION (BAZETT): 456 MS
EKG R AXIS: 9 DEGREES
EKG T AXIS: 19 DEGREES
EKG VENTRICULAR RATE: 92 BPM
EOSINOPHILS ABSOLUTE: 0.1 K/UL (ref 0–0.6)
EOSINOPHILS RELATIVE PERCENT: 1.5 %
GFR AFRICAN AMERICAN: >60
GFR NON-AFRICAN AMERICAN: >60
GLUCOSE BLD-MCNC: 110 MG/DL (ref 70–99)
HCG QUALITATIVE: NEGATIVE
HCT VFR BLD CALC: 42.7 % (ref 36–48)
HEMOGLOBIN: 15 G/DL (ref 12–16)
INR BLD: 1.08 (ref 0.88–1.12)
LYMPHOCYTES ABSOLUTE: 2.2 K/UL (ref 1–5.1)
LYMPHOCYTES RELATIVE PERCENT: 24.3 %
MCH RBC QN AUTO: 29.9 PG (ref 26–34)
MCHC RBC AUTO-ENTMCNC: 35.1 G/DL (ref 31–36)
MCV RBC AUTO: 85.2 FL (ref 80–100)
MONOCYTES ABSOLUTE: 0.8 K/UL (ref 0–1.3)
MONOCYTES RELATIVE PERCENT: 8.7 %
NEUTROPHILS ABSOLUTE: 6 K/UL (ref 1.7–7.7)
NEUTROPHILS RELATIVE PERCENT: 64.6 %
PDW BLD-RTO: 13.1 % (ref 12.4–15.4)
PLATELET # BLD: 430 K/UL (ref 135–450)
PMV BLD AUTO: 8.1 FL (ref 5–10.5)
POTASSIUM SERPL-SCNC: 3.2 MMOL/L (ref 3.5–5.1)
PROTHROMBIN TIME: 12.3 SEC (ref 9.9–12.7)
RBC # BLD: 5.01 M/UL (ref 4–5.2)
SODIUM BLD-SCNC: 134 MMOL/L (ref 136–145)
TROPONIN: <0.01 NG/ML
TROPONIN: <0.01 NG/ML
WBC # BLD: 9.2 K/UL (ref 4–11)

## 2022-05-07 PROCEDURE — 6370000000 HC RX 637 (ALT 250 FOR IP): Performed by: STUDENT IN AN ORGANIZED HEALTH CARE EDUCATION/TRAINING PROGRAM

## 2022-05-07 PROCEDURE — 6360000002 HC RX W HCPCS: Performed by: STUDENT IN AN ORGANIZED HEALTH CARE EDUCATION/TRAINING PROGRAM

## 2022-05-07 PROCEDURE — 84484 ASSAY OF TROPONIN QUANT: CPT

## 2022-05-07 PROCEDURE — 36415 COLL VENOUS BLD VENIPUNCTURE: CPT

## 2022-05-07 PROCEDURE — 80048 BASIC METABOLIC PNL TOTAL CA: CPT

## 2022-05-07 PROCEDURE — 93010 ELECTROCARDIOGRAM REPORT: CPT | Performed by: INTERNAL MEDICINE

## 2022-05-07 PROCEDURE — 96374 THER/PROPH/DIAG INJ IV PUSH: CPT

## 2022-05-07 PROCEDURE — 71046 X-RAY EXAM CHEST 2 VIEWS: CPT

## 2022-05-07 PROCEDURE — 85610 PROTHROMBIN TIME: CPT

## 2022-05-07 PROCEDURE — 93005 ELECTROCARDIOGRAM TRACING: CPT

## 2022-05-07 PROCEDURE — 6370000000 HC RX 637 (ALT 250 FOR IP)

## 2022-05-07 PROCEDURE — 99285 EMERGENCY DEPT VISIT HI MDM: CPT

## 2022-05-07 PROCEDURE — 93005 ELECTROCARDIOGRAM TRACING: CPT | Performed by: STUDENT IN AN ORGANIZED HEALTH CARE EDUCATION/TRAINING PROGRAM

## 2022-05-07 PROCEDURE — 85025 COMPLETE CBC W/AUTO DIFF WBC: CPT

## 2022-05-07 PROCEDURE — 2580000003 HC RX 258: Performed by: STUDENT IN AN ORGANIZED HEALTH CARE EDUCATION/TRAINING PROGRAM

## 2022-05-07 PROCEDURE — 84703 CHORIONIC GONADOTROPIN ASSAY: CPT

## 2022-05-07 RX ORDER — POTASSIUM CHLORIDE 20 MEQ/1
40 TABLET, EXTENDED RELEASE ORAL ONCE
Status: COMPLETED | OUTPATIENT
Start: 2022-05-07 | End: 2022-05-07

## 2022-05-07 RX ORDER — 0.9 % SODIUM CHLORIDE 0.9 %
1000 INTRAVENOUS SOLUTION INTRAVENOUS ONCE
Status: COMPLETED | OUTPATIENT
Start: 2022-05-07 | End: 2022-05-07

## 2022-05-07 RX ORDER — POTASSIUM CHLORIDE 750 MG/1
TABLET, EXTENDED RELEASE ORAL
Status: COMPLETED
Start: 2022-05-07 | End: 2022-05-07

## 2022-05-07 RX ORDER — POTASSIUM CHLORIDE 7.45 MG/ML
10 INJECTION INTRAVENOUS ONCE
Status: COMPLETED | OUTPATIENT
Start: 2022-05-07 | End: 2022-05-07

## 2022-05-07 RX ORDER — KETOROLAC TROMETHAMINE 30 MG/ML
15 INJECTION, SOLUTION INTRAMUSCULAR; INTRAVENOUS ONCE
Status: COMPLETED | OUTPATIENT
Start: 2022-05-07 | End: 2022-05-07

## 2022-05-07 RX ORDER — METHOCARBAMOL 500 MG/1
500 TABLET, FILM COATED ORAL 4 TIMES DAILY PRN
Qty: 20 TABLET | Refills: 0 | Status: SHIPPED | OUTPATIENT
Start: 2022-05-07 | End: 2022-05-12

## 2022-05-07 RX ADMIN — POTASSIUM CHLORIDE 40 MEQ: 1500 TABLET, EXTENDED RELEASE ORAL at 15:22

## 2022-05-07 RX ADMIN — KETOROLAC TROMETHAMINE 15 MG: 30 INJECTION, SOLUTION INTRAMUSCULAR at 15:22

## 2022-05-07 RX ADMIN — POTASSIUM CHLORIDE 40 MEQ: 1500 TABLET, EXTENDED RELEASE ORAL at 15:35

## 2022-05-07 RX ADMIN — POTASSIUM CHLORIDE 40 MEQ: 10 TABLET, EXTENDED RELEASE ORAL at 15:35

## 2022-05-07 RX ADMIN — POTASSIUM CHLORIDE 10 MEQ: 7.46 INJECTION, SOLUTION INTRAVENOUS at 15:25

## 2022-05-07 RX ADMIN — SODIUM CHLORIDE 1000 ML: 9 INJECTION, SOLUTION INTRAVENOUS at 15:22

## 2022-05-07 ASSESSMENT — PAIN - FUNCTIONAL ASSESSMENT: PAIN_FUNCTIONAL_ASSESSMENT: 0-10

## 2022-05-07 ASSESSMENT — PAIN SCALES - GENERAL: PAINLEVEL_OUTOF10: 8

## 2022-05-07 ASSESSMENT — HEART SCORE: ECG: 1

## 2022-05-07 ASSESSMENT — PAIN DESCRIPTION - LOCATION: LOCATION: CHEST

## 2022-05-07 NOTE — ED NOTES
IV catheter discontinued intact. Site without signs and symptoms of complications. Dressing and pressure applied.      Frank Mejia RN  05/07/22 [id number]

## 2022-05-07 NOTE — ED NOTES
Patient discharge completed. Discharge information included information on diagnosis including signs and symptoms, complications and when to seek medical attention. Information on new medications also provided included use for the medication, side effects and when to call the doctor. Patient verbalized understanding of all discharge information. Patient escorted out by staff with all documented belongings. Home with family.      Venkatesh Moyer RN  05/07/22 0417

## 2022-05-07 NOTE — ED PROVIDER NOTES
[address] ED      CHIEF COMPLAINT  Chest Pain (Pt has chest pain that started at 9am this morning. Stats that at  first she felt like it was a pulled muscle. Pain is on the left side of her chest and goes up to her shoulder and her back. )     HISTORY OF PRESENT ILLNESS  Reece Carroll is a 35 y.o. female  who presents to the ED complaining of left-sided back pain radiating to her chest.  Patient states that symptoms started when she woke up this morning around 9 AM.  States first like she felt like she had a pulled muscle in her left shoulder, then started to radiate to the left side of her chest.  She has not taken anything at home for the pain. She denies any cardiac history, states that she does have a history of hypertension. No history of hyperlipidemia, is not a smoker, no history of diabetes or coronary artery disease in early age in her family. She denies any associated shortness of breath, abdominal pain nausea or vomiting. Denies other complaints or concerns. No other complaints, modifying factors or associated symptoms. I have reviewed the following from the nursing documentation.     Past Medical History:   Diagnosis Date    Anxiety 10/18/2019    Chondromalacia of right patella     Depression     Essential hypertension 8/5/2020    Gastroesophageal reflux disease without esophagitis 7/12/2017    Irritable bowel syndrome without diarrhea 8/9/2017     Past Surgical History:   Procedure Laterality Date    CHOLECYSTECTOMY  2003    OVARIAN CYST REMOVAL Bilateral 2/19/2020    LAPAROSCOPIC BILATERAL OVARIAN CYSTECTOMY performed by Olga Steiner MD at [id number] SOFT TISSUE BACK/FLANK SUBQ <3CM N/A 7/16/2019    WIDE LOCAL EXCISION BACK LESION performed by Jamie Moody MD at [address]     Family History   Problem Relation Age of Onset    Diabetes Mother     Depression Mother     High Blood Pressure Mother     High Cholesterol Mother     High Blood Pressure Father     No Known Problems Brother      Social History     Socioeconomic History    Marital status: Single     Spouse name: Not on file    Number of children: Not on file    Years of education: Not on file    Highest education level: Not on file   Occupational History    Not on file   Tobacco Use    Smoking status: Never Smoker    Smokeless tobacco: Never Used   Vaping Use    Vaping Use: Never used   Substance and Sexual Activity    Alcohol use: Yes    Drug use: No    Sexual activity: Yes     Partners: Male     Birth control/protection: I.U.D. Other Topics Concern    Not on file   Social History Narrative    Not on file     Social Determinants of Health     Financial Resource Strain:     Difficulty of Paying Living Expenses: Not on file   Food Insecurity:     Worried About Running Out of Food in the Last Year: Not on file    Ammon of Food in the Last Year: Not on file   Transportation Needs:     Lack of Transportation (Medical): Not on file    Lack of Transportation (Non-Medical):  Not on file   Physical Activity:     Days of Exercise per Week: Not on file    Minutes of Exercise per Session: Not on file   Stress:     Feeling of Stress : Not on file   Social Connections:     Frequency of Communication with Friends and Family: Not on file    Frequency of Social Gatherings with Friends and Family: Not on file    Attends Jehovah's witness Services: Not on file    Active Member of [address] [place] [address] or Organizations: Not on file    Attends Club or Organization Meetings: Not on file    Marital Status: Not on file   Intimate Partner Violence:     Fear of Current or Ex-Partner: Not on file    Emotionally Abused: Not on file    Physically Abused: Not on file    Sexually Abused: Not on file   Housing Stability:     Unable to Pay for Housing in the Last Year: Not on file    Number of [place] in the Last Year: Not on file    Unstable Housing in the Last Year: Not on file     No current facility-administered medications for this encounter. Current Outpatient Medications   Medication Sig Dispense Refill    methocarbamol (ROBAXIN) 500 MG tablet Take 1 tablet by mouth 4 times daily as needed (muscle pain/spasm) 20 tablet 0    escitalopram (LEXAPRO) 20 MG tablet TAKE ONE TABLET BY MOUTH DAILY 90 tablet 0    hydroCHLOROthiazide (HYDRODIURIL) 25 MG tablet TAKE ONE TABLET BY MOUTH EVERY MORNING 90 tablet 1    meloxicam (MOBIC) 15 MG tablet TAKE ONE TABLET BY MOUTH DAILY 90 tablet 1    acetaminophen (TYLENOL) 325 MG tablet Take 650 mg by mouth every 6 hours as needed for Pain       Allergies   Allergen Reactions    Other Itching and Rash     Patient states she had generalized itching all over after local anesthetic from last time this lesion was excised. REVIEW OF SYSTEMS  10 systems reviewed, pertinent positives per HPI otherwise noted to be negative. PHYSICAL EXAM  /71   Pulse 76   Temp 98.4 °F (36.9 °C) (Oral)   Resp 18   Ht 5' 8\" (1.727 m)   Wt 240 lb (108.9 kg)   SpO2 97%   BMI 36.49 kg/m²    GENERAL APPEARANCE: Awake and alert. Cooperative. No acute distress. HENT: Normocephalic. Atraumatic. NECK: Supple. EYES: PERRL. EOM's grossly intact. HEART/CHEST: RRR. No murmurs. LUNGS: Respirations unlabored. CTAB. Good air exchange. Speaking comfortably in full sentences. ABDOMEN: No tenderness. Soft. Non-distended. No masses. No organomegaly. No guarding or rebound. BACK: Tenderness to palpation over the muscles of the left shoulder, primarily the rhomboids. MUSCULOSKELETAL: No extremity edema. Compartments soft. No deformity. No tenderness in the extremities. All extremities neurovascularly intact. SKIN: Warm and dry. No acute rashes. NEUROLOGICAL: Alert and oriented. CN's 2-12 intact. No gross facial drooping. Strength 5/5, sensation intact. Gait normal.  PSYCHIATRIC: Normal mood and affect. LABS  I have reviewed all labs for this visit.    Results for orders placed or performed during the hospital encounter of 52/94/77   Basic Metabolic Panel   Result Value Ref Range    Sodium 134 (L) 136 - 145 mmol/L    Potassium 3.2 (L) 3.5 - 5.1 mmol/L    Chloride 94 (L) 99 - 110 mmol/L    CO2 28 21 - 32 mmol/L    Anion Gap 12 3 - 16    Glucose 110 (H) 70 - 99 mg/dL    BUN 11 7 - 20 mg/dL    CREATININE 0.7 0.6 - 1.1 mg/dL    GFR Non-African American >60 >60    GFR African American >60 >60    Calcium 9.8 8.3 - 10.6 mg/dL   Troponin   Result Value Ref Range    Troponin <0.01 <0.01 ng/mL   CBC with Auto Differential   Result Value Ref Range    WBC 9.2 4.0 - 11.0 K/uL    RBC 5.01 4.00 - 5.20 M/uL    Hemoglobin 15.0 12.0 - 16.0 g/dL    Hematocrit 42.7 36.0 - 48.0 %    MCV 85.2 80.0 - 100.0 fL    MCH 29.9 26.0 - 34.0 pg    MCHC 35.1 31.0 - 36.0 g/dL    RDW 13.1 12.4 - 15.4 %    Platelets 218 699 - 860 K/uL    MPV 8.1 5.0 - 10.5 fL    Neutrophils % 64.6 %    Lymphocytes % 24.3 %    Monocytes % 8.7 %    Eosinophils % 1.5 %    Basophils % 0.9 %    Neutrophils Absolute 6.0 1.7 - 7.7 K/uL    Lymphocytes Absolute 2.2 1.0 - 5.1 K/uL    Monocytes Absolute 0.8 0.0 - 1.3 K/uL    Eosinophils Absolute 0.1 0.0 - 0.6 K/uL    Basophils Absolute 0.1 0.0 - 0.2 K/uL   HCG Qualitative, Serum   Result Value Ref Range    hCG Qual Negative Detects HCG level >10 MIU/mL   Protime-INR   Result Value Ref Range    Protime 12.3 9.9 - 12.7 sec    INR 1.08 0.88 - 1.12   Troponin   Result Value Ref Range    Troponin <0.01 <0.01 ng/mL   EKG 12 Lead   Result Value Ref Range    Ventricular Rate 92 BPM    Atrial Rate 92 BPM    P-R Interval 148 ms    QRS Duration 86 ms    Q-T Interval 368 ms    QTc Calculation (Bazett) 456 ms    P Axis 57 degrees    R Axis 9 degrees    T Axis 19 degrees    Diagnosis       Normal sinus rhythmMinimal voltage criteria for LVH, may be normal variantAbnormal T waves, consider metabolic disturbanceAbnormal ECGNo previous ECGs availableConfirmed by Sadi Simmons ([id number]) on 5/7/2022 12:43:05 PM       ECG  The Ekg interpreted by me shows  normal sinus rhythm with a rate of 92  Axis is   Normal  QTc is  prolonged at 462  Intervals and Durations are unremarkable. ST Segments: nonspecific changes, nonspecific T wave abnormalities  No previous available for comparison    The Ekg interpreted by me shows  normal sinus rhythm with a rate of 77  Axis is   Normal  QTc is  normal  Intervals and Durations are unremarkable. ST Segments: nonspecific changes  No significant change from prior EKG dated 5/7/2022    RADIOLOGY  XR CHEST (2 VW)   Final Result   No acute cardiopulmonary disease. ED COURSE/MDM  Patient seen and evaluated. Old records reviewed. Labs and imaging reviewed and results discussed with patient. Patient is a 64-year-old female, presenting with concerns for left-sided back pain radiating to her left chest.  Full HPI as detailed above. Upon arrival in the ED, vitals reassuring. Patient is resting comfortably and is in no acute distress. EKG without evidence of acute ischemia or dysrhythmias. Does show nonspecific T wave abnormalities, she is slightly hypokalemic and was given repletion here in the department. Initial troponin is negative. Repeat is pending. She was also treated with Toradol, did suspect that this is more of a musculoskeletal issue given the reproducibility of her pain over her left shoulder muscles. Troponin is negative x2. Her heart score is 2. She was reassessed and is feeling better after IV fluids, Toradol, and potassium. She will be discharged. Advise follow-up with her PCP. She is comfortable in agreement with plan of care.     During the patient's ED course, the patient was given:  Medications   ketorolac (TORADOL) injection 15 mg (15 mg IntraVENous Given 5/7/22 1522)   potassium chloride (KLOR-CON M) extended release tablet 40 mEq (40 mEq Oral Given 5/7/22 1535)   potassium chloride 10 mEq/100 mL IVPB (Peripheral Line) (10 mEq IntraVENous New Bag 5/7/22 1525)   0.9 % sodium chloride bolus (1,000 mLs IntraVENous New Bag 5/7/22 1522)        CLINICAL IMPRESSION  1. Acute pain of left shoulder    2. Chest pain, unspecified type    3. Hypokalemia        Blood pressure 127/71, pulse 76, temperature 98.4 °F (36.9 °C), temperature source Oral, resp. rate 18, height 5' 8\" (1.727 m), weight 240 lb (108.9 kg), SpO2 97 %, not currently breastfeeding. Liberty Johnson was discharged to home in good condition. Patient was given scripts for the following medications. I counseled patient how to take these medications. New Prescriptions    METHOCARBAMOL (ROBAXIN) 500 MG TABLET    Take 1 tablet by mouth 4 times daily as needed (muscle pain/spasm)       Follow-up with:  KRISSY Mckeon - CNP  [facility] [address] [place] [address]  [phone]    Schedule an appointment as soon as possible for a visit       [facility] [place] [facility]  [address]  [facility]  Go to   If symptoms worsen      DISCLAIMER: This chart was created using Dragon dictation software. Efforts were made by me to ensure accuracy, however some errors may be present due to limitations of this technology and occasionally words are not transcribed correctly.        Bud Anderson MD  05/07/22 [id number]

## 2022-05-09 LAB
EKG ATRIAL RATE: 77 BPM
EKG DIAGNOSIS: NORMAL
EKG P AXIS: 47 DEGREES
EKG P-R INTERVAL: 156 MS
EKG Q-T INTERVAL: 384 MS
EKG QRS DURATION: 84 MS
EKG QTC CALCULATION (BAZETT): 434 MS
EKG R AXIS: 6 DEGREES
EKG T AXIS: 16 DEGREES
EKG VENTRICULAR RATE: 77 BPM

## 2022-05-25 ENCOUNTER — TELEMEDICINE (OUTPATIENT)
Dept: FAMILY MEDICINE CLINIC | Age: 42
End: 2022-05-25
Payer: COMMERCIAL

## 2022-05-25 DIAGNOSIS — F33.0 MILD EPISODE OF RECURRENT MAJOR DEPRESSIVE DISORDER (HCC): ICD-10-CM

## 2022-05-25 DIAGNOSIS — R06.02 SOB (SHORTNESS OF BREATH): Primary | ICD-10-CM

## 2022-05-25 DIAGNOSIS — E87.6 HYPOKALEMIA: ICD-10-CM

## 2022-05-25 DIAGNOSIS — I10 ESSENTIAL HYPERTENSION: ICD-10-CM

## 2022-05-25 DIAGNOSIS — F41.9 ANXIETY: ICD-10-CM

## 2022-05-25 DIAGNOSIS — R00.0 TACHYCARDIA: ICD-10-CM

## 2022-05-25 DIAGNOSIS — R53.83 FATIGUE, UNSPECIFIED TYPE: ICD-10-CM

## 2022-05-25 DIAGNOSIS — R73.01 IFG (IMPAIRED FASTING GLUCOSE): ICD-10-CM

## 2022-05-25 PROCEDURE — G8427 DOCREV CUR MEDS BY ELIG CLIN: HCPCS | Performed by: NURSE PRACTITIONER

## 2022-05-25 PROCEDURE — G8417 CALC BMI ABV UP PARAM F/U: HCPCS | Performed by: NURSE PRACTITIONER

## 2022-05-25 PROCEDURE — 1036F TOBACCO NON-USER: CPT | Performed by: NURSE PRACTITIONER

## 2022-05-25 PROCEDURE — 99214 OFFICE O/P EST MOD 30 MIN: CPT | Performed by: NURSE PRACTITIONER

## 2022-05-25 RX ORDER — ESCITALOPRAM OXALATE 20 MG/1
TABLET ORAL
Qty: 90 TABLET | Refills: 1 | Status: SHIPPED | OUTPATIENT
Start: 2022-05-25

## 2022-05-25 RX ORDER — HYDROCHLOROTHIAZIDE 25 MG/1
TABLET ORAL
Qty: 90 TABLET | Refills: 1 | Status: SHIPPED | OUTPATIENT
Start: 2022-05-25

## 2022-05-25 RX ORDER — IBUPROFEN 200 MG
200 TABLET ORAL EVERY 6 HOURS PRN
COMMUNITY

## 2022-05-25 RX ORDER — FEXOFENADINE HCL 180 MG/1
180 TABLET ORAL DAILY
COMMUNITY
End: 2022-07-11

## 2022-05-25 RX ORDER — VITAMIN B COMPLEX
1000 TABLET ORAL DAILY
COMMUNITY

## 2022-05-25 SDOH — ECONOMIC STABILITY: TRANSPORTATION INSECURITY
IN THE PAST 12 MONTHS, HAS LACK OF TRANSPORTATION KEPT YOU FROM MEETINGS, WORK, OR FROM GETTING THINGS NEEDED FOR DAILY LIVING?: NO

## 2022-05-25 SDOH — ECONOMIC STABILITY: FOOD INSECURITY: WITHIN THE PAST 12 MONTHS, THE FOOD YOU BOUGHT JUST DIDN'T LAST AND YOU DIDN'T HAVE MONEY TO GET MORE.: NEVER TRUE

## 2022-05-25 SDOH — ECONOMIC STABILITY: FOOD INSECURITY: WITHIN THE PAST 12 MONTHS, YOU WORRIED THAT YOUR FOOD WOULD RUN OUT BEFORE YOU GOT MONEY TO BUY MORE.: NEVER TRUE

## 2022-05-25 SDOH — ECONOMIC STABILITY: HOUSING INSECURITY
IN THE LAST 12 MONTHS, WAS THERE A TIME WHEN YOU DID NOT HAVE A STEADY PLACE TO SLEEP OR SLEPT IN A SHELTER (INCLUDING NOW)?: NO

## 2022-05-25 SDOH — ECONOMIC STABILITY: INCOME INSECURITY: IN THE LAST 12 MONTHS, WAS THERE A TIME WHEN YOU WERE NOT ABLE TO PAY THE MORTGAGE OR RENT ON TIME?: NO

## 2022-05-25 SDOH — ECONOMIC STABILITY: HOUSING INSECURITY: IN THE LAST 12 MONTHS, HOW MANY PLACES HAVE YOU LIVED?: 1

## 2022-05-25 SDOH — ECONOMIC STABILITY: TRANSPORTATION INSECURITY
IN THE PAST 12 MONTHS, HAS THE LACK OF TRANSPORTATION KEPT YOU FROM MEDICAL APPOINTMENTS OR FROM GETTING MEDICATIONS?: NO

## 2022-05-25 ASSESSMENT — PATIENT HEALTH QUESTIONNAIRE - PHQ9
SUM OF ALL RESPONSES TO PHQ QUESTIONS 1-9: 0
SUM OF ALL RESPONSES TO PHQ QUESTIONS 1-9: 0
7. TROUBLE CONCENTRATING ON THINGS, SUCH AS READING THE NEWSPAPER OR WATCHING TELEVISION: 0
5. POOR APPETITE OR OVEREATING: 0
SUM OF ALL RESPONSES TO PHQ QUESTIONS 1-9: 0
6. FEELING BAD ABOUT YOURSELF - OR THAT YOU ARE A FAILURE OR HAVE LET YOURSELF OR YOUR FAMILY DOWN: 0
SUM OF ALL RESPONSES TO PHQ QUESTIONS 1-9: 0
3. TROUBLE FALLING OR STAYING ASLEEP: 0
2. FEELING DOWN, DEPRESSED OR HOPELESS: 0
SUM OF ALL RESPONSES TO PHQ9 QUESTIONS 1 & 2: 0
9. THOUGHTS THAT YOU WOULD BE BETTER OFF DEAD, OR OF HURTING YOURSELF: 0
4. FEELING TIRED OR HAVING LITTLE ENERGY: 0
8. MOVING OR SPEAKING SO SLOWLY THAT OTHER PEOPLE COULD HAVE NOTICED. OR THE OPPOSITE, BEING SO FIGETY OR RESTLESS THAT YOU HAVE BEEN MOVING AROUND A LOT MORE THAN USUAL: 0
10. IF YOU CHECKED OFF ANY PROBLEMS, HOW DIFFICULT HAVE THESE PROBLEMS MADE IT FOR YOU TO DO YOUR WORK, TAKE CARE OF THINGS AT HOME, OR GET ALONG WITH OTHER PEOPLE: 0
1. LITTLE INTEREST OR PLEASURE IN DOING THINGS: 0

## 2022-05-25 ASSESSMENT — SOCIAL DETERMINANTS OF HEALTH (SDOH): HOW HARD IS IT FOR YOU TO PAY FOR THE VERY BASICS LIKE FOOD, HOUSING, MEDICAL CARE, AND HEATING?: NOT HARD AT ALL

## 2022-05-25 NOTE — PROGRESS NOTES
[] Abnormal -     Mental status: [x] Alert and awake  [x] Oriented to person/place/time [x] Able to follow commands    [] Abnormal -     Eyes:   EOM    [x]  Normal    [] Abnormal -   Sclera  [x]  Normal    [] Abnormal -          Discharge [x]  None visible   [] Abnormal -     HENT: [x] Normocephalic, atraumatic  [] Abnormal -   [x] Mouth/Throat: Mucous membranes are moist    External Ears [x] Normal  [] Abnormal -    Neck: [x] No visualized mass [] Abnormal -     Pulmonary/Chest: [x] Respiratory effort normal   [x] No visualized signs of difficulty breathing or respiratory distress        [] Abnormal -      Musculoskeletal:   [x] Normal gait with no signs of ataxia         [x] Normal range of motion of neck        [] Abnormal -     Neurological:        [x] No Facial Asymmetry (Cranial nerve 7 motor function) (limited exam due to video visit)          [x] No gaze palsy        [] Abnormal -          Skin:        [x] No significant exanthematous lesions or discoloration noted on facial skin         [] Abnormal -            Psychiatric:       [x] Normal Affect [] Abnormal -        [x] No Hallucinations    Other pertinent observable physical exam findings:-                 Bertin Ham, was evaluated through a synchronous (real-time) audio-video encounter. The patient (or guardian if applicable) is aware that this is a billable service, which includes applicable co-pays. This Virtual Visit was conducted with patient's (and/or legal guardian's) consent. The visit was conducted pursuant to the emergency declaration under the [address] [place] [address], [address] [place] [address] authority and the [place] [employer] General Act. Patient identification was verified, and a caregiver was present when appropriate. The patient was located at Home: MAKAYLA Cruz [address]  [address] [place], [place] [id number].    Provider was located at Home ([facility]): [id number] [facility], APRN - CNP

## 2022-06-02 DIAGNOSIS — R00.0 TACHYCARDIA: ICD-10-CM

## 2022-06-02 DIAGNOSIS — R53.83 FATIGUE, UNSPECIFIED TYPE: ICD-10-CM

## 2022-06-02 DIAGNOSIS — R06.02 SOB (SHORTNESS OF BREATH): ICD-10-CM

## 2022-06-02 DIAGNOSIS — E87.6 HYPOKALEMIA: ICD-10-CM

## 2022-06-02 DIAGNOSIS — R73.01 IFG (IMPAIRED FASTING GLUCOSE): ICD-10-CM

## 2022-06-02 LAB
ANION GAP SERPL CALCULATED.3IONS-SCNC: 13 MMOL/L (ref 3–16)
BUN BLDV-MCNC: 9 MG/DL (ref 7–20)
CALCIUM SERPL-MCNC: 8.7 MG/DL (ref 8.3–10.6)
CHLORIDE BLD-SCNC: 105 MMOL/L (ref 99–110)
CO2: 23 MMOL/L (ref 21–32)
CREAT SERPL-MCNC: 0.6 MG/DL (ref 0.6–1.1)
GFR AFRICAN AMERICAN: >60
GFR NON-AFRICAN AMERICAN: >60
GLUCOSE BLD-MCNC: 87 MG/DL (ref 70–99)
MAGNESIUM: 2 MG/DL (ref 1.8–2.4)
POTASSIUM SERPL-SCNC: 4.2 MMOL/L (ref 3.5–5.1)
PRO-BNP: 233 PG/ML (ref 0–124)
SODIUM BLD-SCNC: 141 MMOL/L (ref 136–145)
TSH SERPL DL<=0.05 MIU/L-ACNC: 0.86 UIU/ML (ref 0.27–4.2)
VITAMIN B-12: 359 PG/ML (ref 211–911)

## 2022-06-03 ENCOUNTER — TELEPHONE (OUTPATIENT)
Dept: FAMILY MEDICINE CLINIC | Age: 42
End: 2022-06-03

## 2022-06-03 LAB
ESTIMATED AVERAGE GLUCOSE: 108.3 MG/DL
HBA1C MFR BLD: 5.4 %

## 2022-06-03 NOTE — TELEPHONE ENCOUNTER
Vit B 12 is not measured in units. She would just purchase any brand and take 1 tablet/capsule daily.

## 2022-06-03 NOTE — TELEPHONE ENCOUNTER
Patient returned call regarding results. I read the physician notes, patient would like called back about lab results. She would like to know how many units of B12 she should get OTC to take?

## 2022-07-01 ENCOUNTER — HOSPITAL ENCOUNTER (EMERGENCY)
Age: 42
Discharge: HOME | End: 2022-07-01
Payer: COMMERCIAL

## 2022-07-01 ENCOUNTER — APPOINTMENT (OUTPATIENT)
Dept: GENERAL RADIOLOGY | Age: 42
End: 2022-07-01
Payer: COMMERCIAL

## 2022-07-01 VITALS
SYSTOLIC BLOOD PRESSURE: 137 MMHG | OXYGEN SATURATION: 98 % | BODY MASS INDEX: 33.34 KG/M2 | HEART RATE: 84 BPM | DIASTOLIC BLOOD PRESSURE: 96 MMHG | HEIGHT: 68 IN | WEIGHT: 220 LBS | TEMPERATURE: 98 F | RESPIRATION RATE: 18 BRPM

## 2022-07-01 DIAGNOSIS — M25.561 ACUTE PAIN OF RIGHT KNEE: Primary | ICD-10-CM

## 2022-07-01 PROCEDURE — 6370000000 HC RX 637 (ALT 250 FOR IP): Performed by: REGISTERED NURSE

## 2022-07-01 PROCEDURE — 99283 EMERGENCY DEPT VISIT LOW MDM: CPT

## 2022-07-01 PROCEDURE — 73562 X-RAY EXAM OF KNEE 3: CPT

## 2022-07-01 RX ORDER — IBUPROFEN 800 MG/1
800 TABLET ORAL ONCE
Status: COMPLETED | OUTPATIENT
Start: 2022-07-01 | End: 2022-07-01

## 2022-07-01 RX ADMIN — IBUPROFEN 800 MG: 800 TABLET, FILM COATED ORAL at 17:45

## 2022-07-01 ASSESSMENT — ENCOUNTER SYMPTOMS
DIARRHEA: 0
NAUSEA: 0
BACK PAIN: 0
ABDOMINAL PAIN: 0
SHORTNESS OF BREATH: 0
CONSTIPATION: 0
VOMITING: 0
COUGH: 0
RHINORRHEA: 0
SORE THROAT: 0

## 2022-07-01 ASSESSMENT — PAIN SCALES - GENERAL
PAINLEVEL_OUTOF10: 3
PAINLEVEL_OUTOF10: 10

## 2022-07-01 ASSESSMENT — PAIN - FUNCTIONAL ASSESSMENT: PAIN_FUNCTIONAL_ASSESSMENT: 0-10

## 2022-07-01 ASSESSMENT — PAIN DESCRIPTION - LOCATION: LOCATION: KNEE

## 2022-07-01 ASSESSMENT — PAIN DESCRIPTION - ORIENTATION: ORIENTATION: RIGHT

## 2022-07-01 NOTE — ED PROVIDER NOTES
[address] ED  EMERGENCY DEPARTMENT ENCOUNTER        Pt Name: Elias Garcia  MRN: [id number]  [place] 1980  Date of evaluation: 7/1/2022  Provider: KRISSY Brock CNP  PCP: KRISSY Castañeda CNP    This patient was not seen and evaluated by the attending physician. I have evaluated this patient. My supervising physician was available for consultation. CHIEF COMPLAINT       Chief Complaint   Patient presents with    Knee Injury     torn miniscus right knee. states yesterday took a step down and felt like it tore even worse. HISTORY OF PRESENT ILLNESS   (Location/Symptom, Timing/Onset, Context/Setting, Quality, Duration, Modifying Factors, Severity)  Note limiting factors. Elias Garcia is a 35 y.o. female who presents via private car for  Right knee pain. Onset was yesterday. Duration has been since the onset. Context includes she reports that she has previously torn the meniscus to her right knee and was last seen by orthopedics approximately 1 year ago and had some physical therapy. She states yesterday she stepped down off of her front porch 1 step onto her right foot and \"felt a pop and tearing\" to her right knee. She states that she has had mild swelling with pain since having the sensation. She states it felt very similar when she tore her meniscus previously. She denies falls associated with this. She denies any numbness or tingling distal to her knee pain. She denies any other injuries. She states she is able to bear weight and ambulate however this does increase her pain. Quality is aching  with radiation to her knee. Alleviating factors include elevation, naproxen, non weight bearing. Aggravating factors include movement and palpation. Pain is 10/10. Naproxen has been used for pain today.        Chart review reveals pt has significant PMHx of depression, chronic pain of both knees, constipation, GERD, irritable bowel syndrome, chondromalacia of right patella, anxiety, lung nodule, hypertension. They take Allegra, vitamin D, Lexapro, HydroDIURIL, Tylenol. Nursing Notes were all reviewed and agreed with or any disagreements were addressed  in the HPI. Pt was seen during the [place] 19 pandemic. Appropriate PPE worn by ME during patient encounters. Pt seen during a time with constrained hospital bed capacity and other potential inpatient and outpatient resources were constrained due to the viral pandemic. REVIEW OF SYSTEMS    (2-9 systems for level 4, 10 or more for level 5)     Review of Systems   HENT: Negative for congestion, rhinorrhea and sore throat. Respiratory: Negative for cough and shortness of breath. Cardiovascular: Negative for chest pain and leg swelling. Gastrointestinal: Negative for abdominal pain, constipation, diarrhea, nausea and vomiting. Musculoskeletal: Positive for arthralgias. Negative for back pain and neck pain. Right knee pain       Positives and Pertinent negatives as per HPI. Except as noted abovein the ROS, all other systems were reviewed and negative.        PAST MEDICAL HISTORY     Past Medical History:   Diagnosis Date    Anxiety 10/18/2019    Chondromalacia of right patella     Depression     Essential hypertension 8/5/2020    Gastroesophageal reflux disease without esophagitis 7/12/2017    Irritable bowel syndrome without diarrhea 8/9/2017         SURGICAL HISTORY     Past Surgical History:   Procedure Laterality Date    CHOLECYSTECTOMY  2003    OVARIAN CYST REMOVAL Bilateral 2/19/2020    LAPAROSCOPIC BILATERAL OVARIAN CYSTECTOMY performed by Jeanmarie Navarrete MD at [address] [facility] TUMOR SOFT TISSUE BACK/FLANK SUBQ <3CM N/A 7/16/2019    WIDE LOCAL EXCISION BACK LESION performed by Michelle Pascal MD at [address]       Previous Medications    ACETAMINOPHEN (TYLENOL) 325 MG TABLET    Take 650 mg by mouth every 6 hours as needed for Pain ESCITALOPRAM (LEXAPRO) 20 MG TABLET    TAKE ONE TABLET BY MOUTH DAILY    FEXOFENADINE (ALLEGRA ALLERGY) 180 MG TABLET    Take 180 mg by mouth daily    HYDROCHLOROTHIAZIDE (HYDRODIURIL) 25 MG TABLET    TAKE ONE TABLET BY MOUTH EVERY MORNING    IBUPROFEN (ADVIL;MOTRIN) 200 MG TABLET    Take 200 mg by mouth every 6 hours as needed for Pain    VITAMIN D (CHOLECALCIFEROL) 25 MCG (1000 UT) TABS TABLET    Take 1,000 Units by mouth daily         ALLERGIES     Patient has no known allergies. FAMILYHISTORY       Family History   Problem Relation Age of Onset    Diabetes Mother     Depression Mother     High Blood Pressure Mother     High Cholesterol Mother     High Blood Pressure Father     No Known Problems Brother           SOCIAL HISTORY       Social History     Socioeconomic History    Marital status: Single     Spouse name: None    Number of children: None    Years of education: None    Highest education level: None   Occupational History    None   Tobacco Use    Smoking status: Never Smoker    Smokeless tobacco: Never Used   Vaping Use    Vaping Use: Never used   Substance and Sexual Activity    Alcohol use: Yes    Drug use: No    Sexual activity: Yes     Partners: Male     Birth control/protection: I.U.D. Other Topics Concern    None   Social History Narrative    None     Social Determinants of Health     Financial Resource Strain: Low Risk     Difficulty of Paying Living Expenses: Not hard at all   Food Insecurity: No Food Insecurity    Worried About Running Out of Food in the Last Year: Never true    [place] of Food in the Last Year: Never true   Transportation Needs: No Transportation Needs    Lack of Transportation (Medical): No    Lack of Transportation (Non-Medical):  No   Physical Activity:     Days of Exercise per Week: Not on file    Minutes of Exercise per Session: Not on file   Stress:     Feeling of Stress : Not on file   Social Connections:     Frequency of Communication with Friends and Family: Not on file    Frequency of Social Gatherings with Friends and Family: Not on file    Attends Yarsani Services: Not on file    Active Member of Clubs or Organizations: Not on file    Attends Club or Organization Meetings: Not on file    Marital Status: Not on file   Intimate Partner Violence:     Fear of Current or Ex-Partner: Not on file    Emotionally Abused: Not on file    Physically Abused: Not on file    Sexually Abused: Not on file   Housing Stability: [address] Unable to Pay for Housing in the Last Year: No    Number of [place] in the Last Year: 1    Unstable Housing in the Last Year: No       SCREENINGS    Merle Coma Scale  Eye Opening: Spontaneous  Best Verbal Response: Oriented  Best Motor Response: Obeys commands  Merle Coma Scale Score: 15        PHYSICAL EXAM    (up to 7 for level 4, 8 or more for level 5)     ED Triage Vitals [07/01/22 1650]   BP Temp Temp Source Heart Rate Resp SpO2 Height Weight   (!) 137/96 98 °F (36.7 °C) Oral 84 18 98 % 5' 7.5\" (1.715 m) 220 lb (99.8 kg)       Physical Exam  Vitals and nursing note reviewed. Constitutional:       Appearance: Normal appearance. She is not ill-appearing or diaphoretic. HENT:      Head: Normocephalic and atraumatic. Right Ear: External ear normal.      Left Ear: External ear normal.   Eyes:      General:         Right eye: No discharge. Left eye: No discharge. Cardiovascular:      Pulses:           Dorsalis pedis pulses are 2+ on the right side. Posterior tibial pulses are 2+ on the right side. Pulmonary:      Effort: Pulmonary effort is normal. No respiratory distress. Musculoskeletal:         General: Swelling and tenderness present. No deformity. Normal range of motion. Cervical back: Normal range of motion and neck supple. Right knee: Swelling present. No deformity, effusion, erythema, ecchymosis, lacerations, bony tenderness or crepitus.  Normal range of motion. Tenderness present over the medial joint line, lateral joint line and ACL. No patellar tendon tenderness. Normal alignment, normal meniscus and normal patellar mobility. Normal pulse. Legs:    Skin:     General: Skin is warm and dry. Findings: No bruising or erythema. Neurological:      General: No focal deficit present. Mental Status: She is alert and oriented to person, place, and time. Psychiatric:         Mood and Affect: Mood normal.         Behavior: Behavior normal.       PHYSICAL EXAM  BP (!) 137/96   Pulse 84   Temp 98 °F (36.7 °C) (Oral)   Resp 18   Ht 5' 7.5\" (1.715 m)   Wt 220 lb (99.8 kg)   SpO2 98%   BMI 33.95 kg/m²       DIAGNOSTIC RESULTS   LABS:    Labs Reviewed - No data to display    All other labs were within normal range or not returned as of this dictation. EKG: All EKG's are interpreted by the Emergency Department Physician who either signs orCo-signs this chart in the absence of a cardiologist.  Please see their note for interpretation of EKG. RADIOLOGY:   Non-plain film images such as CT, Ultrasound and MRI are read by the radiologist. Plain radiographic images are visualized andpreliminarily interpreted by the  ED Provider with the below findings:        Interpretation perthe Radiologist below, if available at the time of this note:    No orders to display     No results found.        PROCEDURES   Unless otherwise noted below, none     Procedures    CRITICAL CARE TIME   N/A    CONSULTS:  None      EMERGENCY DEPARTMENT COURSE and DIFFERENTIALDIAGNOSIS/MDM:   Vitals:    Vitals:    07/01/22 1650   BP: (!) 137/96   Pulse: 84   Resp: 18   Temp: 98 °F (36.7 °C)   TempSrc: Oral   SpO2: 98%   Weight: 220 lb (99.8 kg)   Height: 5' 7.5\" (1.715 m)       Patient was given thefollowing medications:  Medications - No data to display    PDMP Monitoring:    Last PDMP Alvarez Ovalle as Reviewed [facility]):  Review User Review Instant Review Result            Urine Drug Screenings (1 yr)    No resulted procedures found. Medication Contract and Consent for Opioid Use Documents Filed      No documents found                MDM:   This patient was seen and evaluated by myself. She presents to the emergency department today with complaints of right knee pain. On exam she is alert and oriented, hemodynamically stable and nontoxic in appearance. She will have a work-up in the emergency department consisting of imaging and she will be medicated for pain. X-ray of the right knee interpreted by the radiologist for small suprapatellar joint effusion but no acute osseous abnormality involving the right knee. Bone mineralization is within normal limits. Joint spaces appear unremarkable. No acute fractures or dislocations are seen. There is no soft tissue swelling or bony erosions. I did communicate the findings of the radiology studies with the patient. On reevaluation she did report a slight improvement of her pain after taking the ibuprofen. I discussed with her a plan for discharge including following up with orthopedics, she was provided a referral.  I discussed with her taking over-the-counter Tylenol and ibuprofen as needed for pain control as well as rest ice and elevation and using the Ace wrap for compression. She was provided with crutches to assist with ambulation at home per her request.  She was provided with strict follow-up precautions for the emergency department including but not limited to worsening pain, chest pain, shortness of breath, numbness or tingling distal to the knee. The patient verbalized understanding of all discharge teaching was ultimately discharged in a stable condition with all questions answered. Is this patient to be included in the SEP-1 Core Measure due to severe sepsis or septic shock? No   Exclusion criteria - the patient is NOT to be included for SEP-1 Core Measure due to:   Infection is not suspected    Discharge Time out:  CC Reviewed Yes Test Results Yes     Vitals:    07/01/22 1650   BP: (!) 137/96   Pulse: 84   Resp: 18   Temp: 98 °F (36.7 °C)   SpO2: 98%              FINAL IMPRESSION    No diagnosis found. DISPOSITION/PLAN   DISPOSITION        PATIENT REFERREDTO:  No follow-up provider specified.     DISCHARGE MEDICATIONS:  New Prescriptions    No medications on file       DISCONTINUED MEDICATIONS:  Discontinued Medications    No medications on file              (Please note that portions ofthis note were completed with a voice recognition program.  Efforts were made to edit the dictations but occasionally words are mis-transcribed.)    KRISSY Elizondo CNP (electronically signed)       KRISSY Elizondo CNP  07/01/22 1298

## 2022-07-01 NOTE — ED TRIAGE NOTES
Chief Complaint   Patient presents with    Knee Injury     torn miniscus right knee. states yesterday took a step down and felt like it tore even worse.

## 2022-07-01 NOTE — Clinical Note
Viviana Coker was seen and treated in our emergency department on 7/1/2022. She may return to work on 07/02/2022. If you have any questions or concerns, please don't hesitate to call.       Sari Gil, KRISSY - CNP

## 2022-07-11 ENCOUNTER — OFFICE VISIT (OUTPATIENT)
Dept: ORTHOPEDIC SURGERY | Age: 42
End: 2022-07-11

## 2022-07-11 ENCOUNTER — TELEPHONE (OUTPATIENT)
Dept: ORTHOPEDIC SURGERY | Age: 42
End: 2022-07-11

## 2022-07-11 VITALS — HEIGHT: 68 IN | WEIGHT: 235 LBS | RESPIRATION RATE: 16 BRPM | BODY MASS INDEX: 35.61 KG/M2

## 2022-07-11 DIAGNOSIS — G89.29 CHRONIC PAIN OF RIGHT KNEE: Primary | ICD-10-CM

## 2022-07-11 DIAGNOSIS — M25.561 CHRONIC PAIN OF RIGHT KNEE: Primary | ICD-10-CM

## 2022-07-11 PROCEDURE — G8427 DOCREV CUR MEDS BY ELIG CLIN: HCPCS | Performed by: ORTHOPAEDIC SURGERY

## 2022-07-11 PROCEDURE — 99213 OFFICE O/P EST LOW 20 MIN: CPT | Performed by: ORTHOPAEDIC SURGERY

## 2022-07-11 PROCEDURE — G8417 CALC BMI ABV UP PARAM F/U: HCPCS | Performed by: ORTHOPAEDIC SURGERY

## 2022-07-11 PROCEDURE — 1036F TOBACCO NON-USER: CPT | Performed by: ORTHOPAEDIC SURGERY

## 2022-07-11 NOTE — PROGRESS NOTES
CHIEF COMPLAINT: Right knee pain. DATE OF INJURY: 7 years ago    History:   Tanika Munoz is a 35 y.o. female here for right knee follow-up. Initial history: self-referred for evaluation and treatment of right knee pain / injury. The patient complains of right knee pain. This is evaluated as a personal injury. She has previously seen Dr. Ehsan Chavez. The pain began 7 years ago. Rate pain 10/10. There was a history of injury. She had multiple falls 7 years ago. She states she saw Dr. Yana Torres and he want to do surgery at that time. She states she cannot afford to have surgery at that time and did not proceed. Has had increased pain over the last 5 years. The pain is located globally. Pain is worse with walking, and sitting for long time. She feels like she has catching sensations posteriorly. She feels like her knee gives out sometimes. There is swelling in the knee. The patient has had PT in approximately 2017, which she states did not help. . The patient has had an injection, with relief for about 1 week. The patient has taken NSAIDs without relief. The patient has tried ice. The patient's occupation is at [employer]. Interval History: She was last seen last year. She states her knee has bothered her throughout that time. We did provide her with a functional brace. She states this did not help. She was seen at [facility] on 7/1/2022. On 6/29/2022, she stepped off her front porch onto her right foot and felt a pop/tearing to her right knee. Pain is located medially. She has no swelling. Symptoms are worse with \"everything. \"  She has tried ice with some relief. She has mixed 800 mg of ibuprofen in the morning with 440 mg of naproxen at night.         Past Medical History:   Diagnosis Date    Anxiety 10/18/2019    Chondromalacia of right patella     Depression     Essential hypertension 8/5/2020    Gastroesophageal reflux disease without esophagitis 7/12/2017    Irritable bowel syndrome without diarrhea 8/9/2017       Past Surgical History:   Procedure Laterality Date    CHOLECYSTECTOMY  2003    OVARIAN CYST REMOVAL Bilateral 2/19/2020    LAPAROSCOPIC BILATERAL OVARIAN CYSTECTOMY performed by Jeanmarie Navarrete MD at [address] [facility] TUMOR SOFT TISSUE BACK/FLANK SUBQ <3CM N/A 7/16/2019    WIDE LOCAL EXCISION BACK LESION performed by Michelle Pascal MD at [address]       Family History   Problem Relation Age of Onset    Diabetes Mother     Depression Mother     High Blood Pressure Mother     High Cholesterol Mother     High Blood Pressure Father     No Known Problems Brother        Social History     Socioeconomic History    Marital status: Single     Spouse name: None    Number of children: None    Years of education: None    Highest education level: None   Occupational History    None   Tobacco Use    Smoking status: Never Smoker    Smokeless tobacco: Never Used   Vaping Use    Vaping Use: Never used   Substance and Sexual Activity    Alcohol use: Yes    Drug use: No    Sexual activity: Yes     Partners: Male     Birth control/protection: I.U.D. Other Topics Concern    None   Social History Narrative    None     Social Determinants of Health     Financial Resource Strain: Low Risk     Difficulty of Paying Living Expenses: Not hard at all   Food Insecurity: No Food Insecurity    Worried About Running Out of Food in the Last Year: Never true    Ammon of Food in the Last Year: Never true   Transportation Needs: No Transportation Needs    Lack of Transportation (Medical): No    Lack of Transportation (Non-Medical):  No   Physical Activity:     Days of Exercise per Week: Not on file    Minutes of Exercise per Session: Not on file   Stress:     Feeling of Stress : Not on file   Social Connections:     Frequency of Communication with Friends and Family: Not on file    Frequency of Social Gatherings with Friends and Family: Not on file    Attends Islam Services: Not on file   [facility] or Organizations: Not on file    Attends Club or Organization Meetings: Not on file    Marital Status: Not on file   Intimate Partner Violence:     Fear of Current or Ex-Partner: Not on file    Emotionally Abused: Not on file    Physically Abused: Not on file    Sexually Abused: Not on file   Housing Stability: [address] Unable to Pay for Housing in the Last Year: No    Number of [place] in the Last Year: 1    Unstable Housing in the Last Year: No       Current Outpatient Medications   Medication Sig Dispense Refill    Fexofenadine HCl (ALLEGRA PO) Take by mouth      ibuprofen (ADVIL;MOTRIN) 200 MG tablet Take 200 mg by mouth every 6 hours as needed for Pain      Vitamin D (CHOLECALCIFEROL) 25 MCG (1000 UT) TABS tablet Take 1,000 Units by mouth daily      escitalopram (LEXAPRO) 20 MG tablet TAKE ONE TABLET BY MOUTH DAILY 90 tablet 1    hydroCHLOROthiazide (HYDRODIURIL) 25 MG tablet TAKE ONE TABLET BY MOUTH EVERY MORNING 90 tablet 1     No current facility-administered medications for this visit. No Known Allergies    Review of Systems:  I have reviewed the clinically relevant past medical history, medications, allergies, family history, social history, and 13 point Review of Systems from the patient's recent history form & documented any details relevant to today's presenting complaints in the history above. The patient's self-reported past medical history, medications, allergies, family history, social history, and Review of Systems form from 3/24/21 have been scanned into the chart under the \"Media\" tab. Physical Examination:     Vital signs:   Resp 16   Ht 5' 7.5\" (1.715 m)   Wt 235 lb (106.6 kg)   BMI 36.26 kg/m²     General:  alert, appears stated age, cooperative and no distress   Gait:  Antalgic. The patient can bear weight on the injured extremity.      Right Knee  Alignment:  neutral   ROM:  0 degrees extension to 120 degrees flexion   Bilateral knees   Crepitus:  patellar   Joint Tenderness:  lateral joint line and medial joint line   Effusion:   30-40 cc   Patellar excursion:  2 of 4 quadrants    Patellar tilt test:  positive   Patellar facet tenderness:  negative medial   negative lateral   Patellar apprehension test:  negative   Lachman test:  Minimally positive with endpoint   Left knee: negative   Anterior drawer test:  mildly positive with endpoint   Left knee: negative   Posterior drawer:   negative    Left knee: negative   Varus laxity at 30 degrees:  negative   Left knee: negative   Valgus laxity at 30 degrees:   negative   Left knee: negative   Iveth's test:  positive   Left knee: negative         Imaging:  Right Knee MRI 12/29/20:   1. Severe mucoid degeneration of the ACL, relatively stable from prior study, but with    progression of cystic erosions at the anterior tibial spines and posterolateral notch wall.     Prior partial tear is less conspicuous on current study; no interval ligament rupture. 2. Mild-to-moderate notch synovitis, with swelling or synovial infiltration of the PCL, new    from prior study. 3. Grade 2-3 chondromalacia of the patellofemoral compartment, most prominent of the patellar    apex and trochlear groove; stable from prior study. 4. No acute, active, or traumatic medial or lateral meniscus tear. Right knee x-ray: 3 view x-rays of the knee, including bilateral AP and sunrise and lateral of the affected side were obtained and reviewed. Small suprapatellar joint effusion but no acute osseous abnormality   involving the right knee   No fracture, dislocation, lytic lesion. She does have large medial osteophyte on the left knee. Left knee with moderate medial compartment narrowing.         Assessment:     Right knee effusion  Right knee chronic pain  Right knee ACL mucoid degeneration  Right knee synovitis  Right knee patellofemoral chondromalacia  Left knee osteoarthritis  Class 2 obesity  GERD      Plan:     Patient does have a new acute injury. She has effusion within her knee and medial joint line tenderness with positive provocative test for her meniscus. Prior MRIs did not demonstrate any meniscal pathology. MRI of right knee to evaluate medial meniscus. Continue ice as needed. We discussed using only 1 NSAID at a time. Follow-up after MRI. Aakash Beth. Myranda Espinoza MD  Orthopaedic Surgery and Sports Medicine     Disclaimer: This note was generated with use of a verbal recognition program (DRAGON) and an attempt was made to check for errors. It is possible that there are still dictated errors within this office note. If so, please bring any significant errors to my attention for an addendum. All efforts were made to ensure that this office note is accurate.

## 2022-07-18 ENCOUNTER — OFFICE VISIT (OUTPATIENT)
Dept: ORTHOPEDIC SURGERY | Age: 42
End: 2022-07-18
Payer: COMMERCIAL

## 2022-07-18 VITALS — BODY MASS INDEX: 35.61 KG/M2 | WEIGHT: 235 LBS | HEIGHT: 68 IN

## 2022-07-18 DIAGNOSIS — M25.561 CHRONIC PAIN OF RIGHT KNEE: ICD-10-CM

## 2022-07-18 DIAGNOSIS — S83.241A OTHER TEAR OF MEDIAL MENISCUS OF RIGHT KNEE AS CURRENT INJURY, INITIAL ENCOUNTER: Primary | ICD-10-CM

## 2022-07-18 DIAGNOSIS — G89.29 CHRONIC PAIN OF RIGHT KNEE: ICD-10-CM

## 2022-07-18 PROCEDURE — G8417 CALC BMI ABV UP PARAM F/U: HCPCS | Performed by: ORTHOPAEDIC SURGERY

## 2022-07-18 PROCEDURE — 99214 OFFICE O/P EST MOD 30 MIN: CPT | Performed by: ORTHOPAEDIC SURGERY

## 2022-07-18 PROCEDURE — G8427 DOCREV CUR MEDS BY ELIG CLIN: HCPCS | Performed by: ORTHOPAEDIC SURGERY

## 2022-07-18 PROCEDURE — 1036F TOBACCO NON-USER: CPT | Performed by: ORTHOPAEDIC SURGERY

## 2022-07-18 NOTE — PROGRESS NOTES
CHIEF COMPLAINT: Right knee pain. DATE OF INJURY: 7 years ago    History:   Bassam Ross is a 35 y.o. female here for right knee follow-up. Initial history: self-referred for evaluation and treatment of right knee pain / injury. The patient complains of right knee pain. This is evaluated as a personal injury. She has previously seen Dr. Deric Lacey. The pain began 7 years ago. Rate pain 10/10. There was a history of injury. She had multiple falls 7 years ago. She states she saw Dr. Taqueria Griffin and he want to do surgery at that time. She states she cannot afford to have surgery at that time and did not proceed. Has had increased pain over the last 5 years. The pain is located globally. Pain is worse with walking, and sitting for long time. She feels like she has catching sensations posteriorly. She feels like her knee gives out sometimes. There is swelling in the knee. The patient has had PT in approximately 2017, which she states did not help. . The patient has had an injection, with relief for about 1 week. The patient has taken NSAIDs without relief. The patient has tried ice. The patient's occupation is at [employer]. Interval History: She was last seen last year. She states her knee has bothered her throughout that time. We did provide her with a functional brace. She states this did not help. She was seen at [facility] on 7/1/2022. On 6/29/2022, she stepped off her front porch onto her right foot and felt a pop/tearing to her right knee. Pain is located medially. She has no swelling. Symptoms are worse with \"everything. \"  She has tried ice with some relief. She has mixed 800 mg of ibuprofen in the morning with 440 mg of naproxen at night.         Past Medical History:   Diagnosis Date    Anxiety 10/18/2019    Chondromalacia of right patella     Depression     Essential hypertension 8/5/2020    Gastroesophageal reflux disease without esophagitis 7/12/2017    Irritable bowel syndrome without diarrhea 8/9/2017       Past Surgical History:   Procedure Laterality Date    CHOLECYSTECTOMY  2003    OVARIAN CYST REMOVAL Bilateral 2/19/2020    LAPAROSCOPIC BILATERAL OVARIAN CYSTECTOMY performed by Milo Riley MD at [address] SOFT TISSUE BACK/FLANK SUBQ <3CM N/A 7/16/2019    WIDE LOCAL EXCISION BACK LESION performed by Meeta Bah MD at [address]       Family History   Problem Relation Age of Onset    Diabetes Mother     Depression Mother     High Blood Pressure Mother     High Cholesterol Mother     High Blood Pressure Father     No Known Problems Brother        Social History     Socioeconomic History    Marital status: Single     Spouse name: None    Number of children: None    Years of education: None    Highest education level: None   Tobacco Use    Smoking status: Never    Smokeless tobacco: Never   Vaping Use    Vaping Use: Never used   Substance and Sexual Activity    Alcohol use: Yes    Drug use: No    Sexual activity: Yes     Partners: Male     Birth control/protection: I.U.D.      Social Determinants of Health     Financial Resource Strain: Low Risk     Difficulty of Paying Living Expenses: Not hard at all   Food Insecurity: No Food Insecurity    Worried About [address] [facility] in the Last Year: Never true    [address] [facility] [address] in the Last Year: Never true   Transportation Needs: No Transportation Needs    Lack of Transportation (Medical): No    Lack of Transportation (Non-Medical): No   Housing Stability: Low Risk     Unable to Pay for Housing in the Last Year: No    Number of [place] in the Last Year: 1    Unstable Housing in the Last Year: No       Current Outpatient Medications   Medication Sig Dispense Refill    Fexofenadine HCl (ALLEGRA PO) Take by mouth      ibuprofen (ADVIL;MOTRIN) 200 MG tablet Take 200 mg by mouth every 6 hours as needed for Pain      Vitamin D (CHOLECALCIFEROL) 25 MCG (1000 UT) TABS tablet Take 1,000 Units by mouth daily      escitalopram (LEXAPRO) 20 MG tablet TAKE ONE TABLET BY MOUTH DAILY 90 tablet 1    hydroCHLOROthiazide (HYDRODIURIL) 25 MG tablet TAKE ONE TABLET BY MOUTH EVERY MORNING 90 tablet 1     No current facility-administered medications for this visit. No Known Allergies    Review of Systems:  I have reviewed the clinically relevant past medical history, medications, allergies, family history, social history, and 13 point Review of Systems from the patient's recent history form & documented any details relevant to today's presenting complaints in the history above. The patient's self-reported past medical history, medications, allergies, family history, social history, and Review of Systems form from 3/24/21 have been scanned into the chart under the \"Media\" tab. Physical Examination:     Vital signs:   Ht 5' 7.5\" (1.715 m)   Wt 235 lb (106.6 kg)   BMI 36.26 kg/m²     General:  alert, appears stated age, cooperative and no distress   Gait:  Antalgic. The patient can bear weight on the injured extremity. Right Knee  Alignment:  neutral   ROM:  0 degrees extension to 120 degrees flexion   Bilateral knees   Crepitus:  patellar   Joint Tenderness:  lateral joint line and medial joint line   Effusion:   30-40 cc   Patellar excursion:  2 of 4 quadrants    Patellar tilt test:  positive   Patellar facet tenderness:  negative medial   negative lateral   Patellar apprehension test:  negative   Lachman test:  Minimally positive with endpoint   Left knee: negative   Anterior drawer test:  mildly positive with endpoint   Left knee: negative   Posterior drawer:   negative    Left knee: negative   Varus laxity at 30 degrees:  negative   Left knee: negative   Valgus laxity at 30 degrees:   negative   Left knee: negative   Iveth's test:  positive   Left knee: negative         Imaging:  Right Knee MRI 12/29/20:   1.  Severe mucoid degeneration of the ACL, relatively stable from prior study, but with    progression of cystic erosions at the anterior tibial spines and posterolateral notch wall. Prior partial tear is less conspicuous on current study; no interval ligament rupture. 2. Mild-to-moderate notch synovitis, with swelling or synovial infiltration of the PCL, new    from prior study. 3. Grade 2-3 chondromalacia of the patellofemoral compartment, most prominent of the patellar    apex and trochlear groove; stable from prior study. 4. No acute, active, or traumatic medial or lateral meniscus tear. Right knee x-ray: 3 view x-rays of the knee, including bilateral AP and sunrise and lateral of the affected side were obtained and reviewed. Small suprapatellar joint effusion but no acute osseous abnormality   involving the right knee   No fracture, dislocation, lytic lesion. She does have large medial osteophyte on the left knee. Left knee with moderate medial compartment narrowing. Right knee MRI 7/14/2022 [facility]:  1. Posterior root tear medial meniscus at the root-horn junction with cleavage extension through the medial meniscus. Partial extrusion of the meniscal body to the medial gutter. 2.  Stable appearing chronic mucoid degeneration of ACL when compared to previous study December 2020. Results synovial pitting or cystic change in the notch      Assessment:     Right knee medial meniscus tear  Right knee chronic pain  Right knee ACL mucoid degeneration  Right knee patellofemoral chondromalacia  Left knee osteoarthritis  Class 2 obesity  GERD      Plan: We reviewed MRI images and discussed the findings. I had an extensive discussion with Ms. MARLENE Johnston and/or family regarding the natural history, etiology, and long term consequences of her condition. I have presented reasonable alternatives to the patient's proposed care, treatment, and services. I have outlined a treatment plan with them and, in my opinion, surgical intervention is indicated at this time.  Discussion I have had encompassed risks, benefits, and side effects related to the alternatives and the risks related to not receiving the proposed care, treatment, and services. I have discussed the potential complications (including, but not limited to injury to nerve or blood vessel, infection, bleeding, DVT or PE, stiffness, incomplete pain relief, need for further surgery, and anesthetic complications), limitations, expectations, alternatives, and risks of the surgical procedure. We also discussed the importance of postoperative rehabilitation. She has had full opportunity to ask her questions. I have answered them all to her satisfaction. I feel that Ms. Isidor Alpers understands our discussion today and she will provide written informed consent for the procedure. Plan for right knee arthroscopy, partial medial meniscectomy versus root repair. The patient will see their PCP for H&P and clearance for surgery. Madeleine Postin. Michaelene Scheuermann, MD  Orthopaedic Surgery and Sports Medicine     Disclaimer: This note was generated with use of a verbal recognition program (DRAGON) and an attempt was made to check for errors. It is possible that there are still dictated errors within this office note. If so, please bring any significant errors to my attention for an addendum. All efforts were made to ensure that this office note is accurate.

## 2022-07-18 NOTE — LETTER
Surgery Scheduling Form:    Patient Name:  Magalys Baron  Patient :  1980     Patient MR#:  [id number]    Patient Address:  [address] [place] [address]    Surgeon:  Virgen Ny. Romana Lively, M.D.    PCP:  KRISSY Barakat CNP  Insurance:    Payer/Plan Subscr  Sub. Ins. ID Effective Group Num   1. [facility] - * DONNA BLACKBURN 1980 [id number] 1/1/15 CSOHIO     Diagnosis:  Right knee medial meniscus tear  S83.241A    Operation:  Right knee arthroscopy, partial medial meniscectomy versus root repair. 69618 versus 76319     [id number]  Surgeon's Scheduling Instruction:  elective  Requested Date:     OR Time:       Patient Arrival Time:    OR Time Required:  60  Minutes    Anesthesia:  General  Surgical Assistant required:  Yes   Equipment: Arthrex  Standard C-Arm:  No   Mini C-Arm:  No  Status:  Outpatient  PAT Required:  Yes    Comments:   History and Physical: Patient will obtain H+P from PCP prior to surgery  Covid: As of 3/23/2022: no test needed if symptom free  Additional Clearance: no            Bonner Luna. Romana Lively, MD      22 11:14 AM EDT

## 2022-08-19 ENCOUNTER — TELEPHONE (OUTPATIENT)
Dept: FAMILY MEDICINE CLINIC | Age: 42
End: 2022-08-19

## 2022-08-19 ENCOUNTER — OFFICE VISIT (OUTPATIENT)
Dept: FAMILY MEDICINE CLINIC | Age: 42
End: 2022-08-19
Payer: COMMERCIAL

## 2022-08-19 VITALS
HEART RATE: 87 BPM | SYSTOLIC BLOOD PRESSURE: 110 MMHG | OXYGEN SATURATION: 97 % | DIASTOLIC BLOOD PRESSURE: 70 MMHG | WEIGHT: 243.2 LBS | BODY MASS INDEX: 37.53 KG/M2 | RESPIRATION RATE: 18 BRPM

## 2022-08-19 DIAGNOSIS — L72.9 CYST OF SKIN: Primary | ICD-10-CM

## 2022-08-19 PROCEDURE — G8427 DOCREV CUR MEDS BY ELIG CLIN: HCPCS | Performed by: NURSE PRACTITIONER

## 2022-08-19 PROCEDURE — 1036F TOBACCO NON-USER: CPT | Performed by: NURSE PRACTITIONER

## 2022-08-19 PROCEDURE — 99213 OFFICE O/P EST LOW 20 MIN: CPT | Performed by: NURSE PRACTITIONER

## 2022-08-19 PROCEDURE — G8417 CALC BMI ABV UP PARAM F/U: HCPCS | Performed by: NURSE PRACTITIONER

## 2022-08-19 ASSESSMENT — PATIENT HEALTH QUESTIONNAIRE - PHQ9
SUM OF ALL RESPONSES TO PHQ QUESTIONS 1-9: 0
SUM OF ALL RESPONSES TO PHQ9 QUESTIONS 1 & 2: 0
SUM OF ALL RESPONSES TO PHQ QUESTIONS 1-9: 0
SUM OF ALL RESPONSES TO PHQ QUESTIONS 1-9: 0
1. LITTLE INTEREST OR PLEASURE IN DOING THINGS: 0
2. FEELING DOWN, DEPRESSED OR HOPELESS: 0
SUM OF ALL RESPONSES TO PHQ QUESTIONS 1-9: 0

## 2022-08-19 NOTE — TELEPHONE ENCOUNTER
Left message for patient to call back regarding her appointment. To see if she can come in and see Dipika He at 3:45 today, for 30mins. If patient calls back please change appointment.

## 2022-08-19 NOTE — PROGRESS NOTES
Susana Peabody (:  1980) is a 35 y.o. female,Established patient, here for evaluation of the following chief complaint(s):  Abdominal Pain (Near navel )         ASSESSMENT/PLAN:  1. Cyst of skin  -     Hellen Le MD, General Surgery, [facility]      Given location is likely to obtain pressure and irritation. Monitor for redness. Will refer for evaluation. No follow-ups on file. Subjective   SUBJECTIVE/OBJECTIVE:  HPI    Happened to notice a bulge at belly button last Saturday when at her parents house. Denies pain. As this week progressed has had some constipation and now feels it might be tender. Normal routine for BM is daily. Last BM was today. Review of Systems   All other systems reviewed and are negative. Objective   Physical Exam  Constitutional:       Appearance: Normal appearance. Cardiovascular:      Rate and Rhythm: Normal rate. Pulmonary:      Effort: Pulmonary effort is normal.   Skin:     Comments: Jabier 0.5 cm above umbilicus is a firm oval mass that measures jabier 1.5 cm long x .5 cm wide. Natural color, mildly tender. Neg for a central aspect. Neurological:      Mental Status: She is alert and oriented to person, place, and time.    Psychiatric:         Behavior: Behavior normal.                An electronic signature was used to authenticate this note.    --KRISSY WADE - CNP

## 2022-08-30 ENCOUNTER — INITIAL CONSULT (OUTPATIENT)
Dept: SURGERY | Age: 42
End: 2022-08-30
Payer: COMMERCIAL

## 2022-08-30 VITALS
HEIGHT: 68 IN | DIASTOLIC BLOOD PRESSURE: 87 MMHG | TEMPERATURE: 98.1 F | BODY MASS INDEX: 36.43 KG/M2 | SYSTOLIC BLOOD PRESSURE: 128 MMHG | HEART RATE: 96 BPM | WEIGHT: 240.4 LBS

## 2022-08-30 DIAGNOSIS — L72.3 SEBACEOUS CYST: Primary | ICD-10-CM

## 2022-08-30 PROCEDURE — G8427 DOCREV CUR MEDS BY ELIG CLIN: HCPCS | Performed by: SURGERY

## 2022-08-30 PROCEDURE — 99202 OFFICE O/P NEW SF 15 MIN: CPT | Performed by: SURGERY

## 2022-08-30 PROCEDURE — G8417 CALC BMI ABV UP PARAM F/U: HCPCS | Performed by: SURGERY

## 2022-08-30 NOTE — PROGRESS NOTES
New Patient [address] [place] [address] Surgery Tino Jenniffer TIERNEY [place], [address] [facility], [address] [place] [address], [address] [place] [address]  Phone: [phone]  Fax: Caitlyn Roper9   YOB: 1980    Date of Visit:  8/30/2022    Harris BUSH APRN - CNP    HPI:    Patient complains of a mass of the  above umbilicus . The mass has been present for 2 weeks. The mass has not changed in 2 weeks. Symptoms associated  are: none. Patient denies none.     No Known Allergies  Outpatient Medications Marked as Taking for the 8/30/22 encounter (Initial consult) with Osbaldo oJrge MD   Medication Sig Dispense Refill    FIBER ADULT GUMMIES PO Take 2 capsules by mouth daily      Fexofenadine HCl (ALLEGRA PO) Take by mouth      ibuprofen (ADVIL;MOTRIN) 200 MG tablet Take 200 mg by mouth every 6 hours as needed for Pain      Vitamin D (CHOLECALCIFEROL) 25 MCG (1000 UT) TABS tablet Take 1,000 Units by mouth daily      escitalopram (LEXAPRO) 20 MG tablet TAKE ONE TABLET BY MOUTH DAILY 90 tablet 1    hydroCHLOROthiazide (HYDRODIURIL) 25 MG tablet TAKE ONE TABLET BY MOUTH EVERY MORNING 90 tablet 1       Past Medical History:   Diagnosis Date    Anxiety 10/18/2019    Chondromalacia of right patella     Depression     Essential hypertension 8/5/2020    Gastroesophageal reflux disease without esophagitis 7/12/2017    Irritable bowel syndrome without diarrhea 8/9/2017     Past Surgical History:   Procedure Laterality Date    CHOLECYSTECTOMY  2003    OVARIAN CYST REMOVAL Bilateral 2/19/2020    LAPAROSCOPIC BILATERAL OVARIAN CYSTECTOMY performed by Chad Lay MD at [address] SOFT TISSUE BACK/FLANK SUBQ <3CM N/A 7/16/2019    WIDE LOCAL EXCISION BACK LESION performed by Osbaldo Jorge MD at [address]     Family History   Problem Relation Age of Onset    Diabetes Mother     Depression Mother     High Blood Pressure Mother     High Cholesterol Mother High Blood Pressure Father     No Known Problems Brother      Social History     Socioeconomic History    Marital status: Single     Spouse name: Not on file    Number of children: Not on file    Years of education: Not on file    Highest education level: Not on file   Occupational History    Not on file   Tobacco Use    Smoking status: Never    Smokeless tobacco: Never   Vaping Use    Vaping Use: Never used   Substance and Sexual Activity    Alcohol use: Yes    Drug use: No    Sexual activity: Yes     Partners: Male     Birth control/protection: I.U.D. Other Topics Concern    Not on file   Social History Narrative    Not on file     Social Determinants of Health     Financial Resource Strain: Low Risk     Difficulty of Paying Living Expenses: Not hard at all   Food Insecurity: No Food Insecurity    Worried About [address] in the Last Year: Never true    [address] in the Last Year: Never true   Transportation Needs: No Transportation Needs    Lack of Transportation (Medical): No    Lack of Transportation (Non-Medical): No   Physical Activity: Not on file   Stress: Not on file   Social Connections: Not on file   Intimate Partner Violence: Not on file   Housing Stability: Low Risk     Unable to Pay for Housing in the Last Year: No    Number of Places Lived in the Last Year: 1    Unstable Housing in the Last Year: No          A review of the patient's record including allergies, medication list, tobacco history, family history, problem list, medical history and social history has been completed and updates made to the patient's EMR where indicated. Vitals:    08/30/22 1320 08/30/22 1322   BP: (!) 133/90 128/87   Site: Right Wrist Right Wrist   Position: Sitting Sitting   Cuff Size: Medium Adult Medium Adult   Pulse: 100 96   Temp: 98.1 °F (36.7 °C)    Weight: 240 lb 6.4 oz (109 kg)    Height: 5' 7.52\" (1.715 m)      Body mass index is 37.07 kg/m².      Wt Readings from Last 3 Encounters:   08/30/22 240 lb 6.4 oz (109 kg)   08/19/22 243 lb 3.2 oz (110.3 kg)   07/18/22 235 lb (106.6 kg)     BP Readings from Last 3 Encounters:   08/30/22 128/87   08/19/22 110/70   07/01/22 (!) 137/96          REVIEW OF SYSTEMS:   All other systems reviewed; please refer to HPI with pertinent positives, all other ROS are negative    PHYSICAL EXAM:    CONSTITUTIONAL:  awake, alert, no apparent distress and mildly obese  ENT:  normocepalic, without obvious abnormality  NECK:  supple, symmetrical, trachea midline   LUNGS:  Resp easy and unlabored  CARDIOVASCULAR:  regular rate and rhythm  ABDOMEN: soft, non-distended, non-tender  MUSCULOSKELETAL: No edema  NEUROLOGIC:  Mental Status Exam:  Level of Alertness:   awake  Orientation:   person, place, time    A mass of size 5 cm is felt in the above umbilicus. DATA:  N/A    ASSESSMENT:    Epidermoid cyst      PLAN:    - patient elected for continued monitoring  - counseled the pt to return if the cyst becomes infected or would like to have it surgically removed  - RTC prn      Citlali Engle MD PhD     Surgery Staff    I have examined this patient, and read and agree with the note by Citlali Engle MD PhD from today; more than half of the total time was spent by me on the encounter. Small, ~1.5cm rounded epidermal cyst with visible black punctum, just to the right of her supraumbilical trocar scar, firm, mobile, no deep extension/attachment, non-tender    I have offered her elective excision of the lesion if/when it becomes sufficiently symptomatic that she desires to have it removed.   She indicates she agrees with the plan and will notify us when she requests removal.    Cam Villegas MD

## 2022-09-19 ENCOUNTER — TELEPHONE (OUTPATIENT)
Dept: ORTHOPEDIC SURGERY | Age: 42
End: 2022-09-19

## 2022-09-19 NOTE — TELEPHONE ENCOUNTER
General Question     Subject: Patient wondering how long she'll be on crutches if she has her knee cleaned out.     Patient : Shana Chio Number: [phone]

## 2022-09-19 NOTE — TELEPHONE ENCOUNTER
Dr Romana Lively, please advise. Assessment:      Right knee medial meniscus tear  Right knee chronic pain  Right knee ACL mucoid degeneration  Right knee patellofemoral chondromalacia  Left knee osteoarthritis    Plan for right knee arthroscopy, partial medial meniscectomy versus root repair.

## 2022-09-20 NOTE — TELEPHONE ENCOUNTER
If it is a \"clean out\" (menisectomy), 3-5 days until we see her in office. If it is is a root repair, 6 weeks.

## 2022-09-20 NOTE — TELEPHONE ENCOUNTER
L/M FOR CATHILYN  Menisectomy crutch use is 3-5 days and meniscus repair crutch use is 6 weeks post operatively. Patient was instructed to contact the office with further questions or concerns.

## 2022-10-27 LAB — PAP SMEAR, EXTERNAL: NEGATIVE

## 2022-11-08 ENCOUNTER — TELEPHONE (OUTPATIENT)
Dept: ORTHOPEDIC SURGERY | Age: 42
End: 2022-11-08

## 2022-11-08 NOTE — TELEPHONE ENCOUNTER
Surgery and/or Procedure Scheduling     Contact Name: Tamra Corporal Request: RIGHT KNEE  Patient Contact Number: [phone]

## 2022-11-09 NOTE — TELEPHONE ENCOUNTER
I attempted to contact [facility] to schedule surgery, however, the only number provided indicates it is not accepting incoming calls at this time. I have reached out to her via RMI as well; however, I will reach out again tomorrow if I have not heard a response via RMI.

## 2022-11-17 ENCOUNTER — TELEPHONE (OUTPATIENT)
Dept: FAMILY MEDICINE CLINIC | Age: 42
End: 2022-11-17

## 2022-11-17 NOTE — TELEPHONE ENCOUNTER
----- Message from [facility] sent at 11/17/2022  1:33 PM EST -----  Subject: Appointment Request    Reason for Call: Established Patient Appointment needed: Routine Pre-Op    QUESTIONS    Reason for appointment request? Available appointments did not meet   patient need     Additional Information for Provider?  Pt has surgery 12/21 she needs to   schedule a pre op appt.   ---------------------------------------------------------------------------  --------------  Reggie Valdez INFO  [phone]; OK to leave message on voicemail, OK to respond with   electronic message via [facility] portal (only for patients who have   registered [facility] account)  ---------------------------------------------------------------------------  --------------  SCRIPT ANSWERS  JESÚS Screen: Rena Page

## 2022-12-02 ENCOUNTER — TELEPHONE (OUTPATIENT)
Dept: ORTHOPEDIC SURGERY | Age: 42
End: 2022-12-02

## 2022-12-06 ENCOUNTER — TELEPHONE (OUTPATIENT)
Dept: ORTHOPEDIC SURGERY | Age: 42
End: 2022-12-06

## 2022-12-07 ENCOUNTER — OFFICE VISIT (OUTPATIENT)
Dept: FAMILY MEDICINE CLINIC | Age: 42
End: 2022-12-07
Payer: COMMERCIAL

## 2022-12-07 VITALS
SYSTOLIC BLOOD PRESSURE: 110 MMHG | HEART RATE: 84 BPM | RESPIRATION RATE: 18 BRPM | DIASTOLIC BLOOD PRESSURE: 70 MMHG | HEIGHT: 68 IN | WEIGHT: 259 LBS | TEMPERATURE: 98.8 F | OXYGEN SATURATION: 97 % | BODY MASS INDEX: 39.25 KG/M2

## 2022-12-07 DIAGNOSIS — R06.00 NOCTURNAL DYSPNEA: ICD-10-CM

## 2022-12-07 DIAGNOSIS — F33.0 MILD EPISODE OF RECURRENT MAJOR DEPRESSIVE DISORDER (HCC): ICD-10-CM

## 2022-12-07 DIAGNOSIS — R06.83 SNORING: ICD-10-CM

## 2022-12-07 DIAGNOSIS — M22.41 CHONDROMALACIA OF RIGHT PATELLA: Primary | ICD-10-CM

## 2022-12-07 DIAGNOSIS — Z01.818 PRE-OP EXAM: ICD-10-CM

## 2022-12-07 DIAGNOSIS — Z12.31 ENCOUNTER FOR SCREENING MAMMOGRAM FOR MALIGNANT NEOPLASM OF BREAST: ICD-10-CM

## 2022-12-07 DIAGNOSIS — I10 ESSENTIAL HYPERTENSION: ICD-10-CM

## 2022-12-07 LAB
A/G RATIO: 1.3 (ref 1.1–2.2)
ALBUMIN SERPL-MCNC: 4.4 G/DL (ref 3.4–5)
ALP BLD-CCNC: 99 U/L (ref 40–129)
ALT SERPL-CCNC: 18 U/L (ref 10–40)
ANION GAP SERPL CALCULATED.3IONS-SCNC: 18 MMOL/L (ref 3–16)
AST SERPL-CCNC: 14 U/L (ref 15–37)
BASOPHILS ABSOLUTE: 0.1 K/UL (ref 0–0.2)
BASOPHILS RELATIVE PERCENT: 0.8 %
BILIRUB SERPL-MCNC: 0.3 MG/DL (ref 0–1)
BUN BLDV-MCNC: 10 MG/DL (ref 7–20)
CALCIUM SERPL-MCNC: 10.2 MG/DL (ref 8.3–10.6)
CHLORIDE BLD-SCNC: 102 MMOL/L (ref 99–110)
CO2: 22 MMOL/L (ref 21–32)
CREAT SERPL-MCNC: 0.7 MG/DL (ref 0.6–1.1)
EOSINOPHILS ABSOLUTE: 0.2 K/UL (ref 0–0.6)
EOSINOPHILS RELATIVE PERCENT: 1.9 %
GFR SERPL CREATININE-BSD FRML MDRD: >60 ML/MIN/{1.73_M2}
GLUCOSE BLD-MCNC: 83 MG/DL (ref 70–99)
HCT VFR BLD CALC: 42.2 % (ref 36–48)
HEMOGLOBIN: 14.1 G/DL (ref 12–16)
LYMPHOCYTES ABSOLUTE: 2.5 K/UL (ref 1–5.1)
LYMPHOCYTES RELATIVE PERCENT: 27.7 %
MCH RBC QN AUTO: 29.4 PG (ref 26–34)
MCHC RBC AUTO-ENTMCNC: 33.4 G/DL (ref 31–36)
MCV RBC AUTO: 87.8 FL (ref 80–100)
MONOCYTES ABSOLUTE: 0.6 K/UL (ref 0–1.3)
MONOCYTES RELATIVE PERCENT: 6.9 %
NEUTROPHILS ABSOLUTE: 5.7 K/UL (ref 1.7–7.7)
NEUTROPHILS RELATIVE PERCENT: 62.7 %
PDW BLD-RTO: 12.8 % (ref 12.4–15.4)
PLATELET # BLD: 333 K/UL (ref 135–450)
PMV BLD AUTO: 8.5 FL (ref 5–10.5)
POTASSIUM REFLEX MAGNESIUM: 4.2 MMOL/L (ref 3.5–5.1)
RBC # BLD: 4.81 M/UL (ref 4–5.2)
SODIUM BLD-SCNC: 142 MMOL/L (ref 136–145)
TOTAL PROTEIN: 7.9 G/DL (ref 6.4–8.2)
WBC # BLD: 9.1 K/UL (ref 4–11)

## 2022-12-07 PROCEDURE — G8417 CALC BMI ABV UP PARAM F/U: HCPCS | Performed by: NURSE PRACTITIONER

## 2022-12-07 PROCEDURE — 3078F DIAST BP <80 MM HG: CPT | Performed by: NURSE PRACTITIONER

## 2022-12-07 PROCEDURE — 3074F SYST BP LT 130 MM HG: CPT | Performed by: NURSE PRACTITIONER

## 2022-12-07 PROCEDURE — G8484 FLU IMMUNIZE NO ADMIN: HCPCS | Performed by: NURSE PRACTITIONER

## 2022-12-07 PROCEDURE — 1036F TOBACCO NON-USER: CPT | Performed by: NURSE PRACTITIONER

## 2022-12-07 PROCEDURE — G8427 DOCREV CUR MEDS BY ELIG CLIN: HCPCS | Performed by: NURSE PRACTITIONER

## 2022-12-07 PROCEDURE — 93000 ELECTROCARDIOGRAM COMPLETE: CPT | Performed by: NURSE PRACTITIONER

## 2022-12-07 PROCEDURE — 99214 OFFICE O/P EST MOD 30 MIN: CPT | Performed by: NURSE PRACTITIONER

## 2022-12-07 RX ORDER — HYDROCHLOROTHIAZIDE 25 MG/1
TABLET ORAL
Qty: 90 TABLET | Refills: 1 | Status: SHIPPED | OUTPATIENT
Start: 2022-12-07

## 2022-12-07 RX ORDER — BUPROPION HYDROCHLORIDE 150 MG/1
150 TABLET ORAL EVERY MORNING
Qty: 90 TABLET | Refills: 1 | Status: SHIPPED | OUTPATIENT
Start: 2022-12-07

## 2022-12-07 ASSESSMENT — ENCOUNTER SYMPTOMS
BACK PAIN: 0
NAUSEA: 0
TROUBLE SWALLOWING: 0
APNEA: 0
SHORTNESS OF BREATH: 0
CONSTIPATION: 0
CHEST TIGHTNESS: 0
DIARRHEA: 0

## 2022-12-07 ASSESSMENT — PATIENT HEALTH QUESTIONNAIRE - PHQ9
SUM OF ALL RESPONSES TO PHQ QUESTIONS 1-9: 2
SUM OF ALL RESPONSES TO PHQ9 QUESTIONS 1 & 2: 2
SUM OF ALL RESPONSES TO PHQ QUESTIONS 1-9: 2
2. FEELING DOWN, DEPRESSED OR HOPELESS: 1
1. LITTLE INTEREST OR PLEASURE IN DOING THINGS: 1
SUM OF ALL RESPONSES TO PHQ QUESTIONS 1-9: 2
SUM OF ALL RESPONSES TO PHQ QUESTIONS 1-9: 2

## 2022-12-07 NOTE — PROGRESS NOTES
2022    Bertin Ham (:  1980) is a 38 y.o. female, here For pre operative history and physical in preparation for Right knee video arthroscopy, partial medial meniscectomy versus root repair per Dr. Kaya Salazar 2022 at [facility]. Fax:     Ran out of lexapro and HCTZ 2 weeks ago. Today feels the lexapro 20 mg was not helping. Feels like she is in a funk, doesn't want to do anything. Going to work, denies missing work. Patient Active Problem List   Diagnosis    Depression    Chronic pain of both knees    Slow transit constipation    Gastroesophageal reflux disease without esophagitis    Irritable bowel syndrome without diarrhea    Chondromalacia of right patella    Anxiety    Lung nodule < 6cm on CT    Mediastinal mass    Essential hypertension    Rectocele    Sebaceous cyst       Review of Systems   Constitutional:  Negative for chills, fever and unexpected weight change. HENT:  Negative for trouble swallowing. Respiratory:  Negative for apnea, chest tightness and shortness of breath. Awakens catching her breath at night. Reports snoring. Cardiovascular:  Negative for chest pain and palpitations. Gastrointestinal:  Negative for constipation, diarrhea and nausea. Genitourinary:  Negative for difficulty urinating. Musculoskeletal:  Positive for arthralgias. Negative for back pain and gait problem. Neurological:  Negative for dizziness, seizures and headaches. Psychiatric/Behavioral: Negative. Prior to Visit Medications    Medication Sig Taking?  Authorizing Provider   buPROPion (WELLBUTRIN XL) 150 MG extended release tablet Take 1 tablet by mouth every morning Yes KRISSY Gómez CNP   hydroCHLOROthiazide (HYDRODIURIL) 25 MG tablet TAKE ONE TABLET BY MOUTH EVERY MORNING Yes KRISSY Sullivan CNP   FIBER ADULT GUMMIES PO Take 2 capsules by mouth daily Yes Historical Provider, MD   Fexofenadine HCl (ALLEGRA PO) Take by mouth Yes Historical Provider, MD   ibuprofen (ADVIL;MOTRIN) 200 MG tablet Take 200 mg by mouth every 6 hours as needed for Pain Yes Historical Provider, MD   Vitamin D (CHOLECALCIFEROL) 25 MCG (1000 UT) TABS tablet Take 1,000 Units by mouth daily Yes Historical Provider, MD        No Known Allergies    Past Medical History:   Diagnosis Date    Anxiety 10/18/2019    Chondromalacia of right patella     Depression     Essential hypertension 8/5/2020    Gastroesophageal reflux disease without esophagitis 7/12/2017    Irritable bowel syndrome without diarrhea 8/9/2017       Past Surgical History:   Procedure Laterality Date    CHOLECYSTECTOMY  2003    OVARIAN CYST REMOVAL Bilateral 2/19/2020    LAPAROSCOPIC BILATERAL OVARIAN CYSTECTOMY performed by Jessica Cordero MD at [facility] 33 TISSUE BACK/FLANK SUBQ <3CM N/A 7/16/2019    WIDE LOCAL EXCISION BACK LESION performed by Prasanna Mccurdy MD at [facility]    Marital status: Single     Spouse name: Not on file    Number of children: Not on file    Years of education: Not on file    Highest education level: Not on file   Occupational History    Not on file   Tobacco Use    Smoking status: Never    Smokeless tobacco: Never   Vaping Use    Vaping Use: Never used   Substance and Sexual Activity    Alcohol use: Yes    Drug use: No    Sexual activity: Yes     Partners: Male     Birth control/protection: I.U.D. Other Topics Concern    Not on file   Social History Narrative    Not on file     Social Determinants of Health     Financial Resource Strain: Low Risk     Difficulty of Paying Living Expenses: Not hard at all   Food Insecurity: No Food Insecurity    Worried About [address] in the Last Year: Never true    [address] in the Last Year: Never true   Transportation Needs: No Transportation Needs    Lack of Transportation (Medical): No    Lack of Transportation (Non-Medical):  No 05/17/2019 10:45 AM    HDL 42 09/28/2017 09:04 AM    LDLCALC 168 05/17/2019 10:45 AM    LDLCALC 168 09/28/2017 09:04 AM    GLUF 100 09/28/2017 09:04 AM    GLUCOSE 87 06/02/2022 10:32 AM    LABA1C 5.4 06/02/2022 10:32 AM    LABA1C 5.3 02/05/2019 01:19 PM       The ASCVD Risk score (Steff DK, et al., 2019) failed to calculate for the following reasons:    Cannot find a previous HDL lab    Cannot find a previous total cholesterol lab    Immunization History   Administered Date(s) Administered    COVID-19, PFIZER PURPLE top, DILUTE for use, (age 15 y+), 30mcg/0.3mL 04/10/2021, 05/01/2021    Influenza Virus Vaccine 09/10/2019, 10/12/2022    Influenza, FLUARIX, FLULAVAL, FLUZONE (age 10 mo+) AND AFLURIA, (age 1 y+), PF, 0.5mL 10/17/2019    Influenza, FLUCELVAX, (age 10 mo+), MDCK, PF, 0.5mL 08/31/2017    Tdap (Boostrix, Adacel) 07/12/2017       Health Maintenance   Topic Date Due    Cervical cancer screen  08/01/2019    COVID-19 Vaccine (3 - Booster for Pfizer series) 06/26/2021    Depression Monitoring  08/19/2023    Lipids  05/17/2024    DTaP/Tdap/Td vaccine (2 - Td or Tdap) 07/12/2027    Flu vaccine  Completed    Hepatitis C screen  Completed    HIV screen  Completed    Hepatitis A vaccine  Aged Out    Hib vaccine  Aged Out    Meningococcal (ACWY) vaccine  Aged Out    Pneumococcal 0-64 years Vaccine  Aged Out       Assessment & Plan   Chondromalacia of right patella  -     EKG 12 Lead - Clinic Performed  -     CBC with Auto Differential  -     Comprehensive Metabolic Panel w/ Reflex to MG  -     Culture, Urine  Essential hypertension  -     hydroCHLOROthiazide (HYDRODIURIL) 25 MG tablet; TAKE ONE TABLET BY MOUTH EVERY MORNING, Disp-90 tablet, R-1Normal  Snoring  -     Robe Montenegro MD, Sleep Medicine, [facility]  Nocturnal dyspnea  -     Robe Montenegro MD, Sleep Medicine, [facility]  Mild episode of recurrent major depressive disorder ([facility] [address]  -     buPROPion (WELLBUTRIN XL) 150 MG extended release tablet; Take 1 tablet by mouth every morning, Disp-90 tablet, R-1Normal  Encounter for screening mammogram for malignant neoplasm of breast  -     LUCA SUHAS DIGITAL SCREEN BILATERAL; Future  Pre-op exam  -     EKG 12 Lead - Clinic Performed  -     CBC with Auto Differential  -     Comprehensive Metabolic Panel w/ Reflex to MG  -     Culture, Urine       Assessment:       38 y.o. patient with planned surgery as above. Known risk factors for perioperative complications: Hypertension, depression, IBS     Plan: Will stop lexapro and start wellbutrin  mg daily in the morning. 1. Preoperative workup as follows: ECG, hemoglobin, hematocrit, electrolytes, creatinine, glucose, liver function studies, urinalysis (urinary tract instrumentation planned)  2. Change in medication regimen before surgery: Hold all medications on morning of surgery, Discontinue NSAIDs (ibuprofen) 7 days before surgery  3. Prophylaxis for cardiac events with perioperative beta-blockers: {PROPHYLAXIS   4. Deep vein thrombosis prophylaxis: regimen to be chosen by surgical team  5.  No contraindications to planned surgery    Return in about 3 months (around 3/7/2023) for depression.         --ROSELIA BUSH, KRISSY - CNP

## 2022-12-09 LAB — URINE CULTURE, ROUTINE: NORMAL

## 2022-12-12 NOTE — TELEPHONE ENCOUNTER
Auth: # [id number]    Date: 12/21/22 thru 03/20/23  Type of SX:  Outpatient  Location: [facility]  CPT: 42833   DX Code: R20.819F  SX area: Rt knee  Insurance: [facility]      CPT: 47809  SG

## 2022-12-14 ENCOUNTER — TELEPHONE (OUTPATIENT)
Dept: ORTHOPEDIC SURGERY | Age: 42
End: 2022-12-14

## 2022-12-14 NOTE — TELEPHONE ENCOUNTER
Faxing Patient Information       Contact Name: Jermaine Fernandez Number: [phone]  Facility Fax Number: [phone]     FMLA/SHORT TERM DISABILITY HAS NOT BEEN RCV'D BY HR AND THE DEADLINE IS TODAY END OF BUSINESS. PLEASE COMPLETE AND YOU CAN CALL FOR QUESTIONS  @ THE # ABOVE.      FAX: [phone] [employer]

## 2022-12-14 NOTE — TELEPHONE ENCOUNTER
I attempted to contact Bertin regarding her message, however, her voicemail is not set up at this time. [employer] message sent to [facility] with a copy of the completed forms that were faxed on 12/2/2022.

## 2022-12-19 ENCOUNTER — ANESTHESIA EVENT (OUTPATIENT)
Dept: OPERATING ROOM | Age: 42
End: 2022-12-19
Payer: COMMERCIAL

## 2022-12-20 ENCOUNTER — HOSPITAL ENCOUNTER (OUTPATIENT)
Dept: MAMMOGRAPHY | Age: 42
Discharge: HOME | End: 2022-12-20
Payer: COMMERCIAL

## 2022-12-20 DIAGNOSIS — Z12.31 ENCOUNTER FOR SCREENING MAMMOGRAM FOR MALIGNANT NEOPLASM OF BREAST: ICD-10-CM

## 2022-12-20 PROCEDURE — 77067 SCR MAMMO BI INCL CAD: CPT

## 2022-12-20 NOTE — DISCHARGE INSTRUCTIONS
DR VILLAFANA'S  KNEE ARTHROSCOPY POSTOP INSTRUCTIONS    ACTIVITIES:  Use crutches for the first three days after surgery. You may put your foot  down on the ground, but do not put more than the weight of your foot on the ground. This decreases pain and swelling after surgery. Dr Fermín Chi will tell you when you can put  more weight on your foot. DRESSING:  After surgery, a bulky dressing, and sometimes an ice cuff will be applied to your knee. It is normal to have a moderate amount of blood-tinted fluid drainage on the dressing. Keep the dressing clean and dry. ICE/COOLING CUFF: Apply ice to your knee if you did not purchase an ice cuff. If you did purchase a cooling cuff, follow the instructions included with the cuff to keep it cold. Wear the cuff continuously for 72 hours postoperatively. You can use the ice intermittently (such as 30 minutes on, 30 minutes off). Make sure the ice or cooling cuff is not directly in contact with your skin. Prolonged contact can result in frostbite. After 3 days, use after exercising, or to help with pain and swelling, for 30 minutes, 3 to 5 times a day. DRIVING:  You may drive once you are off your crutches and your pain medication and  walking normally. This may be a decision to be made between you and your physical therapist. Owen Figueroa must be able to brake firmly and comfortably. You are the one ultimately responsible when behind the wheel. SHOWERING: The day after the surgery, you may shower by covering the knee with a plastic bag and taping it securely to the top to prevent moisture from soaking the bandages or wounds. Never allow your incisions to get wet as this may predispose you to developing an infections. Dr. Fermín Chi will tell you when you can get the incisions wet.     MEDICATIONS:  Although you have a prescription for a strong pain medication, many patients are able to handle the discomfort postoperatively with a milder medication such as Extra-strength Tylenol. You may also have had a prescription for an anti-inflammatory such as Celebrex or Naprosyn, and an anti-nausea medication such as Phenergan. Take the anti-inflammatory as scheduled with food, but take the narcotic and anti-nausea medication as needed. Narcotic pain medications can cause constipation. Make sure you are drinking adequate amounts of water. Take fiber supplements as needed. Consider using an over-the-counter stool softener and drinking prune juice. EXERCISE: Exercises are extremely important following arthroscopic surgery to help you regain the motion, strength, and flexibility of your knee. That is why we try to get you into physical therapy as soon as possible after surgery. We encourage you to move your knee as normally as possible to help with the return of your flexibility and motion. FOLLOW-UP: You should already have your first postoperative visit scheduled at the time your surgery is scheduled. If you do not, please call the office at [phone] to make the appointment. At the first visit, we will perform a wound check and go over the pictures from your surgery. At the second visit we will remove your stitches. .       ANESTHESIA DISCHARGE INSTRUCTIONS    You are under the influence of drugs- do not drink alcohol, drive a car, operate machinery(such as power tools, kitchen appliances, etc), sign legal documents, or make any important decisions for 24 hours (or while on pain medications). Children should not ride bikes or [place] or play on gym sets  for 24 hours after surgery. A responsible adult should be with you for 24 hours. Rest at home today- increase activity as tolerated. Progress slowly to a regular diet unless your physician has instructed you otherwise. Drink plenty of water. CALL YOUR DOCTOR IF YOU:  Have moderate to severe nausea or vomiting AND are unable to hold down fluids or prescribed medications.   Have bright red bloody drainage from your dressing that won't stop oozing. Do not get relief with your pain medication    NORMAL (POSSIBLE) SIDE EFFECTS FROM ANESTHESIA:     Confusion, temporary memory loss, delayed reaction times in the first 24 hours  Lightheadedness, dizziness, difficulty focusing, blurred vision  Nausea/vomiting can happen  Shivering, feeling cold, sore throat, cough and muscle aches should stop within 24-48 hours  Trouble urinating - call your surgeon if it has been more than 8 hrs  Bruising or soreness at the IV site - call if it remains red, firm or there is drainage             FEMALES OF CHILDBEARING AGE WHO ARE TAKING BIRTH CONTROL PILLS:  You may have received a medication during your procedure that interferes with the   actions of birth control pills (Bridion or Emend). Use some other kind of birth control in addition to your pills, like a condom, for 1 month after your procedure to prevent unwanted pregnancy. The following instructions are to be followed if you have a known history or diagnosis of sleep apnea: For all sleep apnea patients:  ? Sleep on your side or sitting up in a chair whenever possible, especially the first 24 hours after surgery. ? Use only medicines prescribed by your doctor. ? Do not drink alcohol. ? If you have a dental device to assist you while at rest, use it at all times for the first 24 hours. For patients using CPAP machines:  ? Use your CPAP machine during all periods of sleep as usual.  ? Use your CPAP machine during all periods of daytime rest while on pain medicines. ** Follow up with your primary care doctor for continued care. IF YOU DO NOT TAKE ALL OF YOUR NARCOTIC PAIN MEDICATION, please dispose of them responsibly. There are drop off boxes in the Emergency Departments 24/7 at both [facility] and [place]. If these locations are not convenient, other options for discarding them can be found at:  [email or web address]    Hospital or office staff may

## 2022-12-20 NOTE — H&P
Preoperative H&P Update    The patient's History and Physical in the medical record from 12/7/22 was reviewed by me today. I reviewed the HPI, medications, allergies, reason for surgery, diagnosis and treatment plan and there has been no interval change. The patient was examined by me today.  Physical exam findings for this update include:    BP (!) 152/89   Pulse 88   Temp 97.8 °F (36.6 °C) (Temporal)   Resp 16   Ht 5' 8\" (1.727 m)   Wt 259 lb (117.5 kg)   SpO2 97%   BMI 39.38 kg/m²     Airway is intact  Chest: breathing comfortably  Heart: regular rate  Findings on exam of the body region where surgery is to be performed include: see last office and/or consult note        Electronically signed by Itz Kitchen MD on 12/21/2022 at 8:16 AM

## 2022-12-21 ENCOUNTER — HOSPITAL ENCOUNTER (OUTPATIENT)
Age: 42
Setting detail: OUTPATIENT SURGERY
Discharge: HOME | End: 2022-12-21
Attending: ORTHOPAEDIC SURGERY | Admitting: ORTHOPAEDIC SURGERY
Payer: COMMERCIAL

## 2022-12-21 ENCOUNTER — ANESTHESIA (OUTPATIENT)
Dept: OPERATING ROOM | Age: 42
End: 2022-12-21
Payer: COMMERCIAL

## 2022-12-21 VITALS
TEMPERATURE: 97.6 F | SYSTOLIC BLOOD PRESSURE: 153 MMHG | DIASTOLIC BLOOD PRESSURE: 54 MMHG | HEIGHT: 68 IN | BODY MASS INDEX: 39.25 KG/M2 | OXYGEN SATURATION: 99 % | WEIGHT: 259 LBS | HEART RATE: 90 BPM | RESPIRATION RATE: 12 BRPM

## 2022-12-21 DIAGNOSIS — S83.241A OTHER TEAR OF MEDIAL MENISCUS OF RIGHT KNEE AS CURRENT INJURY, INITIAL ENCOUNTER: Primary | ICD-10-CM

## 2022-12-21 PROCEDURE — 6360000002 HC RX W HCPCS: Performed by: ORTHOPAEDIC SURGERY

## 2022-12-21 PROCEDURE — 2500000003 HC RX 250 WO HCPCS: Performed by: NURSE ANESTHETIST, CERTIFIED REGISTERED

## 2022-12-21 PROCEDURE — 29881 ARTHRS KNE SRG MNISECTMY M/L: CPT | Performed by: ORTHOPAEDIC SURGERY

## 2022-12-21 PROCEDURE — 2500000003 HC RX 250 WO HCPCS: Performed by: ANESTHESIOLOGY

## 2022-12-21 PROCEDURE — 6370000000 HC RX 637 (ALT 250 FOR IP): Performed by: ANESTHESIOLOGY

## 2022-12-21 PROCEDURE — 6360000002 HC RX W HCPCS: Performed by: NURSE ANESTHETIST, CERTIFIED REGISTERED

## 2022-12-21 PROCEDURE — 3700000001 HC ADD 15 MINUTES (ANESTHESIA): Performed by: ORTHOPAEDIC SURGERY

## 2022-12-21 PROCEDURE — 2580000003 HC RX 258: Performed by: ORTHOPAEDIC SURGERY

## 2022-12-21 PROCEDURE — 2709999900 HC NON-CHARGEABLE SUPPLY: Performed by: ORTHOPAEDIC SURGERY

## 2022-12-21 PROCEDURE — 7100000001 HC PACU RECOVERY - ADDTL 15 MIN: Performed by: ORTHOPAEDIC SURGERY

## 2022-12-21 PROCEDURE — 7100000000 HC PACU RECOVERY - FIRST 15 MIN: Performed by: ORTHOPAEDIC SURGERY

## 2022-12-21 PROCEDURE — 7100000011 HC PHASE II RECOVERY - ADDTL 15 MIN: Performed by: ORTHOPAEDIC SURGERY

## 2022-12-21 PROCEDURE — 3600000014 HC SURGERY LEVEL 4 ADDTL 15MIN: Performed by: ORTHOPAEDIC SURGERY

## 2022-12-21 PROCEDURE — 6360000002 HC RX W HCPCS: Performed by: ANESTHESIOLOGY

## 2022-12-21 PROCEDURE — 3700000000 HC ANESTHESIA ATTENDED CARE: Performed by: ORTHOPAEDIC SURGERY

## 2022-12-21 PROCEDURE — 2580000003 HC RX 258: Performed by: ANESTHESIOLOGY

## 2022-12-21 PROCEDURE — 3600000004 HC SURGERY LEVEL 4 BASE: Performed by: ORTHOPAEDIC SURGERY

## 2022-12-21 PROCEDURE — 6370000000 HC RX 637 (ALT 250 FOR IP): Performed by: ORTHOPAEDIC SURGERY

## 2022-12-21 PROCEDURE — 7100000010 HC PHASE II RECOVERY - FIRST 15 MIN: Performed by: ORTHOPAEDIC SURGERY

## 2022-12-21 RX ORDER — ONDANSETRON 2 MG/ML
4 INJECTION INTRAMUSCULAR; INTRAVENOUS
Status: DISCONTINUED | OUTPATIENT
Start: 2022-12-21 | End: 2022-12-21 | Stop reason: HOSPADM

## 2022-12-21 RX ORDER — HYDROCODONE BITARTRATE AND ACETAMINOPHEN 5; 325 MG/1; MG/1
1 TABLET ORAL EVERY 4 HOURS PRN
Status: DISCONTINUED | OUTPATIENT
Start: 2022-12-21 | End: 2022-12-21 | Stop reason: HOSPADM

## 2022-12-21 RX ORDER — SODIUM CHLORIDE 9 MG/ML
INJECTION, SOLUTION INTRAVENOUS PRN
Status: DISCONTINUED | OUTPATIENT
Start: 2022-12-21 | End: 2022-12-21 | Stop reason: SDUPTHER

## 2022-12-21 RX ORDER — LIDOCAINE HYDROCHLORIDE 20 MG/ML
INJECTION, SOLUTION EPIDURAL; INFILTRATION; INTRACAUDAL; PERINEURAL PRN
Status: DISCONTINUED | OUTPATIENT
Start: 2022-12-21 | End: 2022-12-21 | Stop reason: SDUPTHER

## 2022-12-21 RX ORDER — ONDANSETRON 2 MG/ML
INJECTION INTRAMUSCULAR; INTRAVENOUS PRN
Status: DISCONTINUED | OUTPATIENT
Start: 2022-12-21 | End: 2022-12-21 | Stop reason: SDUPTHER

## 2022-12-21 RX ORDER — MEPERIDINE HYDROCHLORIDE 25 MG/ML
12.5 INJECTION INTRAMUSCULAR; INTRAVENOUS; SUBCUTANEOUS EVERY 5 MIN PRN
Status: DISCONTINUED | OUTPATIENT
Start: 2022-12-21 | End: 2022-12-21 | Stop reason: HOSPADM

## 2022-12-21 RX ORDER — SODIUM CHLORIDE 0.9 % (FLUSH) 0.9 %
5-40 SYRINGE (ML) INJECTION PRN
Status: DISCONTINUED | OUTPATIENT
Start: 2022-12-21 | End: 2022-12-21 | Stop reason: HOSPADM

## 2022-12-21 RX ORDER — DEXAMETHASONE SODIUM PHOSPHATE 4 MG/ML
INJECTION, SOLUTION INTRA-ARTICULAR; INTRALESIONAL; INTRAMUSCULAR; INTRAVENOUS; SOFT TISSUE PRN
Status: DISCONTINUED | OUTPATIENT
Start: 2022-12-21 | End: 2022-12-21 | Stop reason: SDUPTHER

## 2022-12-21 RX ORDER — FENTANYL CITRATE 50 UG/ML
INJECTION, SOLUTION INTRAMUSCULAR; INTRAVENOUS PRN
Status: DISCONTINUED | OUTPATIENT
Start: 2022-12-21 | End: 2022-12-21 | Stop reason: SDUPTHER

## 2022-12-21 RX ORDER — SODIUM CHLORIDE 0.9 % (FLUSH) 0.9 %
5-40 SYRINGE (ML) INJECTION PRN
Status: DISCONTINUED | OUTPATIENT
Start: 2022-12-21 | End: 2022-12-21 | Stop reason: SDUPTHER

## 2022-12-21 RX ORDER — ROPIVACAINE HYDROCHLORIDE 5 MG/ML
INJECTION, SOLUTION EPIDURAL; INFILTRATION; PERINEURAL
Status: DISCONTINUED
Start: 2022-12-21 | End: 2022-12-21 | Stop reason: HOSPADM

## 2022-12-21 RX ORDER — SODIUM CHLORIDE 0.9 % (FLUSH) 0.9 %
5-40 SYRINGE (ML) INJECTION EVERY 12 HOURS SCHEDULED
Status: DISCONTINUED | OUTPATIENT
Start: 2022-12-21 | End: 2022-12-21 | Stop reason: SDUPTHER

## 2022-12-21 RX ORDER — SODIUM CHLORIDE 9 MG/ML
INJECTION, SOLUTION INTRAVENOUS PRN
Status: DISCONTINUED | OUTPATIENT
Start: 2022-12-21 | End: 2022-12-21 | Stop reason: HOSPADM

## 2022-12-21 RX ORDER — LABETALOL HYDROCHLORIDE 5 MG/ML
5 INJECTION, SOLUTION INTRAVENOUS EVERY 10 MIN PRN
Status: DISCONTINUED | OUTPATIENT
Start: 2022-12-21 | End: 2022-12-21 | Stop reason: HOSPADM

## 2022-12-21 RX ORDER — LIDOCAINE HYDROCHLORIDE 10 MG/ML
0.3 INJECTION, SOLUTION EPIDURAL; INFILTRATION; INTRACAUDAL; PERINEURAL
Status: COMPLETED | OUTPATIENT
Start: 2022-12-21 | End: 2022-12-21

## 2022-12-21 RX ORDER — SODIUM CHLORIDE 0.9 % (FLUSH) 0.9 %
5-40 SYRINGE (ML) INJECTION EVERY 12 HOURS SCHEDULED
Status: DISCONTINUED | OUTPATIENT
Start: 2022-12-21 | End: 2022-12-21 | Stop reason: HOSPADM

## 2022-12-21 RX ORDER — ROPIVACAINE HYDROCHLORIDE 5 MG/ML
INJECTION, SOLUTION EPIDURAL; INFILTRATION; PERINEURAL PRN
Status: DISCONTINUED | OUTPATIENT
Start: 2022-12-21 | End: 2022-12-21 | Stop reason: ALTCHOICE

## 2022-12-21 RX ORDER — PROPOFOL 10 MG/ML
INJECTION, EMULSION INTRAVENOUS PRN
Status: DISCONTINUED | OUTPATIENT
Start: 2022-12-21 | End: 2022-12-21 | Stop reason: SDUPTHER

## 2022-12-21 RX ORDER — POVIDONE-IODINE 10 MG/G
OINTMENT TOPICAL PRN
Status: DISCONTINUED | OUTPATIENT
Start: 2022-12-21 | End: 2022-12-21 | Stop reason: ALTCHOICE

## 2022-12-21 RX ORDER — PROMETHAZINE HYDROCHLORIDE 25 MG/1
25 TABLET ORAL EVERY 6 HOURS PRN
Qty: 5 TABLET | Refills: 0 | Status: SHIPPED | OUTPATIENT
Start: 2022-12-21

## 2022-12-21 RX ORDER — HYDROCODONE BITARTRATE AND ACETAMINOPHEN 5; 325 MG/1; MG/1
2 TABLET ORAL EVERY 4 HOURS PRN
Status: DISCONTINUED | OUTPATIENT
Start: 2022-12-21 | End: 2022-12-21 | Stop reason: HOSPADM

## 2022-12-21 RX ORDER — POVIDONE-IODINE 10 MG/G
OINTMENT TOPICAL
Status: DISCONTINUED
Start: 2022-12-21 | End: 2022-12-21 | Stop reason: HOSPADM

## 2022-12-21 RX ORDER — SODIUM CHLORIDE, SODIUM LACTATE, POTASSIUM CHLORIDE, CALCIUM CHLORIDE 600; 310; 30; 20 MG/100ML; MG/100ML; MG/100ML; MG/100ML
INJECTION, SOLUTION INTRAVENOUS CONTINUOUS
Status: DISCONTINUED | OUTPATIENT
Start: 2022-12-21 | End: 2022-12-21 | Stop reason: HOSPADM

## 2022-12-21 RX ORDER — MIDAZOLAM HYDROCHLORIDE 1 MG/ML
INJECTION INTRAMUSCULAR; INTRAVENOUS PRN
Status: DISCONTINUED | OUTPATIENT
Start: 2022-12-21 | End: 2022-12-21 | Stop reason: SDUPTHER

## 2022-12-21 RX ORDER — HYDROCODONE BITARTRATE AND ACETAMINOPHEN 5; 325 MG/1; MG/1
1 TABLET ORAL EVERY 4 HOURS PRN
Qty: 30 TABLET | Refills: 0 | Status: SHIPPED | OUTPATIENT
Start: 2022-12-21 | End: 2022-12-28

## 2022-12-21 RX ORDER — DIPHENHYDRAMINE HYDROCHLORIDE 50 MG/ML
12.5 INJECTION INTRAMUSCULAR; INTRAVENOUS
Status: DISCONTINUED | OUTPATIENT
Start: 2022-12-21 | End: 2022-12-21 | Stop reason: HOSPADM

## 2022-12-21 RX ADMIN — FENTANYL CITRATE 50 MCG: 50 INJECTION INTRAMUSCULAR; INTRAVENOUS at 08:52

## 2022-12-21 RX ADMIN — ONDANSETRON 4 MG: 2 INJECTION INTRAMUSCULAR; INTRAVENOUS at 09:10

## 2022-12-21 RX ADMIN — HYDROMORPHONE HYDROCHLORIDE 0.5 MG: 1 INJECTION, SOLUTION INTRAMUSCULAR; INTRAVENOUS; SUBCUTANEOUS at 10:00

## 2022-12-21 RX ADMIN — LIDOCAINE HYDROCHLORIDE 3 ML: 20 INJECTION, SOLUTION EPIDURAL; INFILTRATION; INTRACAUDAL; PERINEURAL at 08:56

## 2022-12-21 RX ADMIN — HYDROMORPHONE HYDROCHLORIDE 0.5 MG: 1 INJECTION, SOLUTION INTRAMUSCULAR; INTRAVENOUS; SUBCUTANEOUS at 10:12

## 2022-12-21 RX ADMIN — FENTANYL CITRATE 50 MCG: 50 INJECTION INTRAMUSCULAR; INTRAVENOUS at 09:34

## 2022-12-21 RX ADMIN — DEXAMETHASONE SODIUM PHOSPHATE 8 MG: 4 INJECTION, SOLUTION INTRAMUSCULAR; INTRAVENOUS at 09:10

## 2022-12-21 RX ADMIN — SODIUM CHLORIDE, POTASSIUM CHLORIDE, SODIUM LACTATE AND CALCIUM CHLORIDE: 600; 310; 30; 20 INJECTION, SOLUTION INTRAVENOUS at 07:23

## 2022-12-21 RX ADMIN — FENTANYL CITRATE 50 MCG: 50 INJECTION INTRAMUSCULAR; INTRAVENOUS at 09:15

## 2022-12-21 RX ADMIN — MIDAZOLAM 2 MG: 1 INJECTION INTRAMUSCULAR; INTRAVENOUS at 08:52

## 2022-12-21 RX ADMIN — CEFAZOLIN 2000 MG: 2 INJECTION, POWDER, FOR SOLUTION INTRAMUSCULAR; INTRAVENOUS at 08:57

## 2022-12-21 RX ADMIN — FENTANYL CITRATE 50 MCG: 50 INJECTION INTRAMUSCULAR; INTRAVENOUS at 09:06

## 2022-12-21 RX ADMIN — HYDROCODONE BITARTRATE AND ACETAMINOPHEN 2 TABLET: 5; 325 TABLET ORAL at 10:18

## 2022-12-21 RX ADMIN — LIDOCAINE HYDROCHLORIDE 0.3 ML: 10 INJECTION, SOLUTION EPIDURAL; INFILTRATION; INTRACAUDAL; PERINEURAL at 07:22

## 2022-12-21 RX ADMIN — SODIUM CHLORIDE, POTASSIUM CHLORIDE, SODIUM LACTATE AND CALCIUM CHLORIDE: 600; 310; 30; 20 INJECTION, SOLUTION INTRAVENOUS at 09:22

## 2022-12-21 RX ADMIN — PROPOFOL 200 MG: 10 INJECTION, EMULSION INTRAVENOUS at 08:56

## 2022-12-21 ASSESSMENT — PAIN SCALES - GENERAL
PAINLEVEL_OUTOF10: 10
PAINLEVEL_OUTOF10: 9
PAINLEVEL_OUTOF10: 10
PAINLEVEL_OUTOF10: 7
PAINLEVEL_OUTOF10: 9
PAINLEVEL_OUTOF10: 2
PAINLEVEL_OUTOF10: 10

## 2022-12-21 ASSESSMENT — PAIN - FUNCTIONAL ASSESSMENT: PAIN_FUNCTIONAL_ASSESSMENT: NONE - DENIES PAIN

## 2022-12-21 ASSESSMENT — PAIN DESCRIPTION - DESCRIPTORS
DESCRIPTORS: ACHING;THROBBING

## 2022-12-21 ASSESSMENT — PAIN DESCRIPTION - ORIENTATION
ORIENTATION: RIGHT

## 2022-12-21 ASSESSMENT — PAIN DESCRIPTION - LOCATION
LOCATION: KNEE

## 2022-12-21 NOTE — PROGRESS NOTES
Pt states the pain is the same. Rates it a \"10\". States it feels like a sledge hammer hit her knee.

## 2022-12-21 NOTE — ANESTHESIA POSTPROCEDURE EVALUATION
Department of Anesthesiology  Postprocedure Note    Patient: Bull Angulo  MRN: [id number]  YOB: 1980  Date of evaluation: 12/21/2022      Procedure Summary     Date: 12/21/22 Room / Location: [facility] EG OR 02 / [facility]    Anesthesia Start: 9793 Anesthesia Stop: 2089    Procedure: RIGHT KNEE VIDEO ARTHROSCOPY, PARTIAL MEDIAL MENISCECTOMY VERSUS (Right: Knee) Diagnosis:       Acute medial meniscus tear of right knee, initial encounter      (Acute medial meniscus tear of right knee, initial encounter [L04.649M])    Surgeons: Peter Allen MD Responsible Provider: Devon Stroud MD    Anesthesia Type: general ASA Status: 3          Anesthesia Type: No value filed.     Suzy Phase I: Suzy Score: 10    Suzy Phase II: Suzy Score: 10      Anesthesia Post Evaluation    Comments: Postoperative Anesthesia Note    Name:    Bull Angulo  MRN:      [id number]    Patient Vitals in the past 12 hrs:  12/21/22 1052, BP:(!) 153/54, Pulse:90, Resp:12, SpO2:99 %  12/21/22 1037, BP:136/62, Pulse:90, Resp:11, SpO2:95 %  12/21/22 1022, BP:(!) 141/70, Temp:97.6 °F (36.4 °C), Temp src:Temporal, Pulse:84, Resp:14, SpO2:96 %  12/21/22 1007, BP:(!) 156/63, Pulse:91, Resp:13, SpO2:97 %  12/21/22 0952, BP:138/85, Pulse:87, Resp:12, SpO2:94 %  12/21/22 0947, BP:(!) 154/72, Pulse:88, Resp:16, SpO2:97 %  12/21/22 0942, BP:(!) 146/78, Pulse:87, Resp:15, SpO2:96 %  12/21/22 0937, BP:(!) 144/88, Temp:97.8 °F (36.6 °C), Temp src:Temporal, Pulse:91, Resp:16, SpO2:94 %  12/21/22 0650, BP:(!) 152/89, Temp:97.8 °F (36.6 °C), Temp src:Temporal, Pulse:88, Resp:16, SpO2:97 %, Height:5' 8\" (1.727 m), Weight:259 lb (117.5 kg)     LABS:    CBC  Lab Results       Component                Value               Date/Time                  WBC                      9.1                 12/07/2022 12:34 PM        HGB                      14.1                12/07/2022 12:34 PM        HCT                      42.2 12/07/2022 12:34 PM        PLT                      333                 12/07/2022 12:34 PM   RENAL  Lab Results       Component                Value               Date/Time                  NA                       142                 12/07/2022 12:34 PM        K                        4.2                 12/07/2022 12:34 PM        CL                       102                 12/07/2022 12:34 PM        CO2                      22                  12/07/2022 12:34 PM        BUN                      10                  12/07/2022 12:34 PM        CREATININE               0.7                 12/07/2022 12:34 PM        GLUCOSE                  83                  12/07/2022 12:34 PM   COAGS  Lab Results       Component                Value               Date/Time                  PROTIME                  12.3                05/07/2022 12:28 PM        INR                      1.08                05/07/2022 12:28 PM     Intake & Output:  @45JRYG@    Nausea & Vomiting:  No    Level of Consciousness:  Awake    Pain Assessment:  Adequate analgesia    Anesthesia Complications:  No apparent anesthetic complications    SUMMARY      Vital signs stable  OK to discharge from Stage I post anesthesia care.   Care transferred from Anesthesiology department on discharge from perioperative area

## 2022-12-21 NOTE — BRIEF OP NOTE
Brief Postoperative Note      Patient: Diamond Buchanan  YOB: 1980  MRN: [id number]    Date of Procedure: 12/21/2022    Pre-Op Diagnosis: Right knee medial meniscus tear    Post-Op Diagnosis: Same + grade 2-3 chondromalacia patella and medial tibial plateau, grade 3-4 chondromalacia medial femoral condyle, suprapatellar plica       Procedure(s):  RIGHT KNEE VIDEO ARTHROSCOPY, PARTIAL MEDIAL MENISCECTOMY.  CHONDROPLASTY     Surgeon(s):  Kimberly Riggins MD    Assistant:  Surgical Assistant: Charity Corea    Anesthesia: General    Estimated Blood Loss (mL): Minimal    Complications: None    Specimens:   * No specimens in log *    Implants:  * No implants in log *      Drains: * No LDAs found *        Electronically signed by Kimberly Riggins MD on 12/21/2022 at 9:31 AM

## 2022-12-21 NOTE — PROGRESS NOTES
Discharge  instructions reviewed. Pt and family verbalize understanding with no further questions. VSS. Pt discharged via [address] to car. Assessment unchanged.

## 2022-12-21 NOTE — PROGRESS NOTES
Pt states the pain is slightly better, rates it a \"9\". Medicated with [place] PO. Pt is tolerating drinking fluids and eating crackers.

## 2022-12-21 NOTE — OP NOTE
Bertin Ham (1980)    12/21/2022    Preoperative Diagnosis-  Right knee medial meniscus tear    Postoperative Diagnosis-  Right knee medial meniscus tear, grade 2-3 chondromalacia patella and medial tibial plateau, grade 3-4 chondromalacia medial femoral condyle suprapatellar plica    Procedure-  Right knee diagnostic arthroscopy, Partial medial meniscectomy, Chondroplasty patella/medial femoral condyle, excision suprapatellar plica      Surgeon-  Kelly Pearson MD    Assistant(s)-  Keon Bishop    Anesthesia- General    EBL-  minimal    Complications-  none    Disposition-  To PACU in stable condition      Indications for Procedure  The patient was seen in the office for right knee pain. He has history of multiple falls in the past.  She states that she had previously another outside orthopedic surgeon who had recommended knee surgery. An MRI of her knee demonstrate a posterior root tear of the medial meniscus at the root-horn junction with cleavage extension through the medial meniscus. We discussed both nonoperative and operative treatment options, with operative treatment recommended  We discussed the risks, benefits, and complications to the procedure as well as alternatives and the risks related to not receiving the proposed care, treatment. The patient subsequently provided written informed consent for the procedure. Site Marking and Surgical Prep  The patient was seen in the holding area and the operative extremity was marked. Patient was seen by the anesthesia service. The patient was then brought to the operating room, identified, transferred onto the operating room table in the supine position. The patient was then induced under general anesthesia. The patient received prophylactic preoperative IV antibiotics and had DVT prophylaxis with sequential compression device on the nonoperative lower extremity.   The operative extremity was then sterilely prepped and draped in the usual fashion. A timeout was taken with the patient, the operative extremity, and operative procedure were once again verified. Diagnostic Arthroscopy  The knee was insufflated with 60 cc of normal saline. A standard anterolateral portal was established. The trocar and cannula were inserted. The trocar was removed and the arthroscope and inflow inserted. A superomedial outflow portal was also created. Diagnostic arthroscopy was then performed. A anteromedial portal was created under direct visualization using a spinal needle. Systematic arthroscopy was performed and the findings are summarized below:  Patellofemoral Compartment- The articular cartilage and grade 2-3 chondromalacia. There were no loose bodies in the suprapatellar pouch. Near complete suprapatellar plica. No abnormalities in the medial or lateral gutter. Medial Compartment- The medial meniscus is a degenerative undersurface tear at the root and degeneration in the posterior horn. The root itself was intact, without any evidence of instability. There is free edge fraying at the body and junction to the anterior horn. Grade 3-4 chondromalacia medial femoral condyle and grade 2-3 chondromalacia medial tibial plateau. Intercondylar Notch- The ACL and PCL were intact. Lateral Compartment- The lateral meniscus was intact. Articular cartilage was intact    Mensical/Chondral Work   Meniscectomy  Partial medial meniscectomy. Using a combination of biters and a shaver, the tear was carefully debrided to a stable rim. Chondroplasty  Using a shaver, the frayed chondral edges of the  patella and medial femoral condyle debrided until there is a stable border. 7.    Excision of plica              a.   Suprapatellar plica was excised using the shaver. Closure  The portal sites were closed with 2-0 Nylon. Ropivicaine (0.5%) was injected into the joint and portal sites for local anesthesia.   Steri-Strips, Betadine ointment, 2 x 2 gauze, and Tegaderm dressing were then applied. Sterile web roll and an Ace wrap were then placed. The patient was awakened from anesthesia, transferred onto the hospital bed, and taken to the PACU. The patient tolerated the procedure well and without any complications. Plan:  The patient will be recovered in the PACU and then discharged home. The patient will be touchdown weightbearing for the next 3-4 days until seen in the office. They were given a prescription for Norco as well as Phenergan for nausea and vomiting. They were instructed to ice the knee is much as possible for the next 3-4 days, with careful instructions to make sure the ice or cooling pad does not directly contact the skin to avoid the potential for frostbite. They are instructed to start taking over-the-counter NSAIDs,, with careful instructions to take it with food and to stop if any side effects/complications. They will follow-up in the office in the next 3-4 days for wound check, to review arthroscopic images, and to start him on physical therapy. Elizabeth Melissa.  Ronald Curran MD  Orthopaedic Surgery and Sports Medicine

## 2022-12-21 NOTE — ANESTHESIA PRE PROCEDURE
Department of Anesthesiology  Preprocedure Note       Name:  Nnamdi Para   Age:  38 y.o.  :  1980                                          MRN:  [id number]         Date:  2022      Surgeon: Hanna Bhatti):  Kelly Pearson MD    Procedure: Procedure(s):  RIGHT KNEE VIDEO ARTHROSCOPY, PARTIAL MEDIAL MENISCECTOMY VERSUS ROOT REPAIR    Medications prior to admission:   Prior to Admission medications    Medication Sig Start Date End Date Taking? Authorizing Provider   HYDROcodone-acetaminophen (NORCO) 5-325 MG per tablet Take 1 tablet by mouth every 4 hours as needed for Pain for up to 7 days.  Max Daily Amount: 6 tablets 22 Yes Kelly Pearson MD   promethazine (PHENERGAN) 25 MG tablet Take 1 tablet by mouth every 6 hours as needed for Nausea 22  Yes Kelly Pearson MD   buPROPion (WELLBUTRIN XL) 150 MG extended release tablet Take 1 tablet by mouth every morning 22   KRISSY Weinstein CNP   hydroCHLOROthiazide (HYDRODIURIL) 25 MG tablet TAKE ONE TABLET BY MOUTH EVERY MORNING 22   KRISSY Weinstein CNP   FIBER ADULT GUMMIES PO Take 2 capsules by mouth daily    Historical Provider, MD   Fexofenadine HCl (ALLEGRA PO) Take by mouth    Historical Provider, MD   ibuprofen (ADVIL;MOTRIN) 200 MG tablet Take 200 mg by mouth every 6 hours as needed for Pain    Historical Provider, MD   Vitamin D (CHOLECALCIFEROL) 25 MCG (1000 UT) TABS tablet Take 1,000 Units by mouth daily    Historical Provider, MD       Current medications:    Current Facility-Administered Medications   Medication Dose Route Frequency Provider Last Rate Last Admin    lidocaine PF 1 % injection 0.3 mL  0.3 mL IntraDERmal Once PRN Abby Allred MD        lactated ringers infusion   IntraVENous Continuous Abby Allred MD        sodium chloride flush 0.9 % injection 5-40 mL  5-40 mL IntraVENous 2 times per day Abby Allred MD        sodium chloride flush 0.9 % injection 5-40 mL  5-40 mL IntraVENous PRN Don Lu MD        0.9 % sodium chloride infusion   IntraVENous PRN Don Lu MD        ceFAZolin (ANCEF) 2,000 mg in sodium chloride 0.9 % 50 mL IVPB (mini-bag)  2,000 mg IntraVENous Once João Ca MD        povidone-iodine (BETADINE) 10 % ointment             ropivacaine (NAROPIN) 0.5% injection                Allergies:  No Known Allergies    Problem List:    Patient Active Problem List   Diagnosis Code    Depression F32. A    Chronic pain of both knees M25.561, M25.562, G89.29    Slow transit constipation K59.01    Gastroesophageal reflux disease without esophagitis K21.9    Irritable bowel syndrome without diarrhea K58.9    Chondromalacia of right patella M22.41    Anxiety F41.9    Lung nodule < 6cm on CT [id number]    Mediastinal mass J98.59    Essential hypertension I10    Rectocele N81.6    Sebaceous cyst L72.3       Past Medical History:        Diagnosis Date    Anxiety 10/18/2019    Chondromalacia of right patella     Depression     Essential hypertension 08/05/2020    Gastroesophageal reflux disease without esophagitis 07/12/2017    Irritable bowel syndrome without diarrhea 08/09/2017       Past Surgical History:        Procedure Laterality Date    CHOLECYSTECTOMY  2003    OVARIAN CYST REMOVAL Bilateral 02/19/2020    LAPAROSCOPIC BILATERAL OVARIAN CYSTECTOMY performed by Chema Solis MD at [place] [address]  05/03/2021    POST COLPORRHAPHY,RECTUM/VAGINA sling, enterocele repair, cystoscopy-[facility] EXCISION TUMOR SOFT TISSUE BACK/FLANK SUBQ <3CM N/A 07/16/2019    WIDE LOCAL EXCISION BACK LESION performed by Kaushik Vale MD at [place] [address] History:    Social History     Tobacco Use    Smoking status: Never    Smokeless tobacco: Never   Substance Use Topics    Alcohol use:  Yes                                Counseling given: Not Answered      Vital Signs (Current):   Vitals:    12/21/22 0650   BP: (!) 152/89   Pulse: 88   Resp: 16   Temp: 97.8 °F (36.6 °C)   TempSrc: Temporal   SpO2: 97%   Weight: 259 lb (117.5 kg)   Height: 5' 8\" (1.727 m)                                              BP Readings from Last 3 Encounters:   12/21/22 (!) 152/89   12/07/22 110/70   08/30/22 128/87       NPO Status: Time of last liquid consumption: 1900                        Time of last solid consumption: 1900                        Date of last liquid consumption: 12/20/22                        Date of last solid food consumption: 12/20/22    BMI:   Wt Readings from Last 3 Encounters:   12/21/22 259 lb (117.5 kg)   12/07/22 259 lb (117.5 kg)   08/30/22 240 lb 6.4 oz (109 kg)     Body mass index is 39.38 kg/m². CBC:   Lab Results   Component Value Date/Time    WBC 9.1 12/07/2022 12:34 PM    RBC 4.81 12/07/2022 12:34 PM    HGB 14.1 12/07/2022 12:34 PM    HCT 42.2 12/07/2022 12:34 PM    MCV 87.8 12/07/2022 12:34 PM    RDW 12.8 12/07/2022 12:34 PM     12/07/2022 12:34 PM       CMP:   Lab Results   Component Value Date/Time     12/07/2022 12:34 PM    K 4.2 12/07/2022 12:34 PM     12/07/2022 12:34 PM    CO2 22 12/07/2022 12:34 PM    BUN 10 12/07/2022 12:34 PM    CREATININE 0.7 12/07/2022 12:34 PM    GFRAA >60 06/02/2022 10:32 AM    AGRATIO 1.3 12/07/2022 12:34 PM    LABGLOM >60 12/07/2022 12:34 PM    GLUCOSE 83 12/07/2022 12:34 PM    PROT 7.9 12/07/2022 12:34 PM    CALCIUM 10.2 12/07/2022 12:34 PM    BILITOT 0.3 12/07/2022 12:34 PM    ALKPHOS 99 12/07/2022 12:34 PM    AST 14 12/07/2022 12:34 PM    ALT 18 12/07/2022 12:34 PM       POC Tests: No results for input(s): POCGLU, POCNA, POCK, POCCL, POCBUN, POCHEMO, POCHCT in the last 72 hours.     Coags:   Lab Results   Component Value Date/Time    PROTIME 12.3 05/07/2022 12:28 PM    INR 1.08 05/07/2022 12:28 PM       HCG (If Applicable):   Lab Results   Component Value Date    PREGTESTUR Negative 02/19/2020        ABGs: No results found for: PHART, PO2ART, UIN5PBY, BCP2UGK, BEART, K4RSRPTM     Type & Screen (If Applicable):  No results found for: LABABO, LABRH    Drug/Infectious Status (If Applicable):  No results found for: HIV, HEPCAB    COVID-19 Screening (If Applicable):   Lab Results   Component Value Date/Time    COVID19 Detected 01/06/2022 10:45 AM           Anesthesia Evaluation  Patient summary reviewed no history of anesthetic complications:   Airway: Mallampati: III  TM distance: >3 FB   Neck ROM: full  Mouth opening: > = 3 FB   Dental:          Pulmonary:normal exam  breath sounds clear to auscultation      (-) COPD, asthma and sleep apnea                           Cardiovascular:  Exercise tolerance: good (>4 METS),   (+) hypertension:,     (-) CAD,  angina and  RIOJAS      Rhythm: regular  Rate: normal                    Neuro/Psych:   (+) psychiatric history:   (-) seizures and TIA           GI/Hepatic/Renal:   (+) GERD:, morbid obesity     (-) liver disease and no renal disease       Endo/Other:        (-) diabetes mellitus               Abdominal:             Vascular: negative vascular ROS. Other Findings:           Anesthesia Plan      general     ASA 3     (I discussed with the patient the risks and benefits of PIV, anesthesia, IV Narcotics, PACU. All questions were answered the patient agrees with the plan and wishes to proceed)  Induction: intravenous.                             Candelario Scott MD   12/21/2022

## 2022-12-22 ENCOUNTER — TELEPHONE (OUTPATIENT)
Dept: ORTHOPEDIC SURGERY | Age: 42
End: 2022-12-22

## 2022-12-22 NOTE — TELEPHONE ENCOUNTER
General Question     Subject: RTW AND CAST QUESTIONS  Patient and /or Facility Request: Pedro Jones  Contact Number:  [phone]    PT REQ CALLBACK AT NUMBER LISTED TO SEE ABOUT KEEPING HER CAST ON FOR PHYSICAL THERAPY OR NOT. PT ALSO REQ AN UPDETED RTW NOTE STATING SHE CAN RETURN TO WORK Monday 12.26. PT STATES JOB IS MOSTLY SITTING AND SHE WILL BE USING CRUTCHES AS NECESSARY AS WELL AS ELEVATORS IN WORKPLACE. CONTACT PT TO DISCUSS AND ADVISE.

## 2022-12-22 NOTE — TELEPHONE ENCOUNTER
S/W DONNA  States her employer will accommodate sedentary duties and would like to return on 12.26.2022. This will be sent via [address] Discussed removing cotton and ACE wrap from post op dressing tomorrow - may use ACE wrap to rewrap leg for swelling, especially since wanting to return to work Monday. Patient voiced understanding of the conversation and will contact the office with further questions or concerns.

## 2022-12-22 NOTE — LETTER
[facility]  [address] [place] [address] [address] [address]  Phone: [phone]  Fax: [phone]    Tabitha Urias MD        December 22, 2022     Patient: Kobi Childs   YOB: 1980   Date of Visit: 12/22/2022       To Whom It May Concern: It is my medical opinion that Kobi Childs may return to work on 12/26/2022 with the following restrictions:    Allow for elevation of right leg    Crutch use    Sedentary duties only   No climbing, twisting, squatting, pivoting   Restrictions in place for 1/5/2023   If these restrictions are unable to be accommodated, the patient will need to be off work. If you have any questions or concerns, please don't hesitate to call.     Sincerely,    Tabitha Urias MD

## 2022-12-23 ENCOUNTER — TELEPHONE (OUTPATIENT)
Dept: ORTHOPEDIC SURGERY | Age: 42
End: 2022-12-23

## 2022-12-23 NOTE — TELEPHONE ENCOUNTER
S/W DONNA  Discussed removing cotton and ACE wrap from post op dressing today - may use ACE wrap to rewrap leg for swelling, especially since wanting to return to work Monday. Patient voiced understanding of the conversation and will contact the office with further questions or concerns.

## 2022-12-23 NOTE — TELEPHONE ENCOUNTER
Other PATIENT CALLED STATES THAT SHE IS NEEDING TO GET INFORMATION ABOUT THE CARE OF HER BANDAGES.  PLS CALL TO ASSIST [phone]

## 2022-12-28 ENCOUNTER — HOSPITAL ENCOUNTER (OUTPATIENT)
Dept: PHYSICAL THERAPY | Age: 42
Setting detail: THERAPIES SERIES
Discharge: HOME | End: 2022-12-28
Payer: COMMERCIAL

## 2022-12-28 PROCEDURE — 97161 PT EVAL LOW COMPLEX 20 MIN: CPT | Performed by: PHYSICAL THERAPIST

## 2022-12-28 PROCEDURE — 97140 MANUAL THERAPY 1/> REGIONS: CPT | Performed by: PHYSICAL THERAPIST

## 2022-12-28 PROCEDURE — 97110 THERAPEUTIC EXERCISES: CPT | Performed by: PHYSICAL THERAPIST

## 2022-12-28 NOTE — FLOWSHEET NOTE
[address] [facility], [address] [place] [address] [address]  Phone: [phone]   Fax:     [phone]    Physical Therapy Treatment Note/ Progress Report:     Date:  2022    Patient Name:  Venus Shabazz    :  1980  MRN: [id number]  Restrictions/Precautions:    Medical/Treatment Diagnosis Information:  Other tear of medial meniscus of right knee as current injury, initial encounter Jackqueline Denver  Right knee PMM on   Insurance/Certification information:   [facility]ly [facility]  Physician Information:  Lesly Pereyra MD   Has the plan of care been signed (Y/N):        []  Yes  [x]  No     Date of Patient follow up with Physician: 23    Is this a Progress Report:     []  Yes  [x]  No      If Yes:  Date Range for reporting period:  Initial Eval: 2022  Beginnin2022 --- Endin23    Progress report will be due (10 Rx or 30 days whichever is less):      Recertification will be due (POC Duration  / 90 days whichever is less): 3/28/23      Visit # Insurance Allowable Auth Required   In Person 1 30 []  Yes     []  No    Tele Health 0  []  Yes     []  No    Total 1       LEFS Score: 70%     Date assessed: 2022      Latex Allergy:  [x]NO      []YES  Preferred Language for Healthcare:   [x]English       []other:    Pain level:  10     SUBJECTIVE:  See eval    OBJECTIVE:   Observation:   Test measurements: See Eval    RESTRICTIONS/PRECAUTIONS:     Exercises/Interventions:   Therapeutic Ex (16883)  Therapeutic Activity (74988)  NMR re-education (31627) Sets/Reps Notes/CUES   Bike          QS x20    Long sitting HSS 3x30\"    Long sitting gastroc 3x30\"    SLR x10    SSLR x10    Heel prop 1 min    EOB knee flexion 10x10\"    EOB LAQ x20                                                                          Manual Intervention (48964)     Patella mobs / flexion 10 min Patient Education 5 min Provided biomechanics/ergonomics training to reduce stress across injured/healing structures. Therapeutic Exercise and NMR EXR  [x] (83635) Provided verbal/tactile cueing for activities related to strengthening, flexibility, endurance, ROM for improvements in LE, proximal hip, and core control with self care, mobility, lifting, ambulation. [x] (35260) Provided verbal/tactile cueing for activities related to improving balance, coordination, kinesthetic sense, posture, motor skill, proprioception to assist with LE, proximal hip, and core control in self-care, mobility, lifting, ambulation and eccentric single leg control. NMR and Therapeutic Activities:    [x] (59357 or 47385) Provided verbal/tactile cueing for activities related to improving balance, coordination, kinesthetic sense, posture, motor skill, proprioception and motor activation to allow for proper function of core, proximal hip and LE with self-care and ADLs and functional mobility.    [x] (81256) Gait Re-education- Provided training and instruction to the patient for proper LE, core and proximal hip recruitment and positioning and eccentric body weight control with ambulation re-education including up and down stairs     Home Exercise Program:    [x] (37480) Reviewed/Progressed HEP activities related to strengthening, flexibility, endurance, ROM of core, proximal hip and LE for functional self-care, mobility, lifting and ambulation/stair navigation   [x] (50863) Reviewed/Progressed HEP activities related to improving balance, coordination, kinesthetic sense, posture, motor skill, proprioception of core, proximal hip and LE for self-care, mobility, lifting, and ambulation/stair navigation      Manual Treatments:  PROM / STM / Oscillations-Mobs:  G-I, II, III, IV (PA's, Inf., Post.)  [x] (22776) Provided manual therapy to mobilize LE, proximal hip and/or LS spine soft tissue/joints for the purpose of modulating pain, promoting relaxation, increasing ROM, reducing/eliminating soft tissue swelling/inflammation/restriction, improving soft tissue extensibility and allowing for proper ROM for normal function with self-care, mobility, lifting and ambulation. Modalities:      Charges:  Timed Code Treatment Minutes: 30   Total Treatment Minutes:  50   BWC:  TE TIME:  NMR TIME:  MANUAL TIME:  UNTIMED MINUTES:  Medicare Total:                  [x] EVAL (LOW) 19382 (typically 20 minutes face-to-face)  [] EVAL (MOD) 68296 (typically 30 minutes face-to-face)  [] EVAL (HIGH) 80494 (typically 45 minutes face-to-face)  [] RE-EVAL     [x] ZW(60948) x  1  [] IONTO  [] NMR (55355) x     [] VASO  [x] Manual (84499) x 1    [] Other:  [] TA x      [] Mech Traction (96172)  [] ES(attended) (90869)      [] ES (un) (98533):    ASSESSMENT:  Patient presents with signs and symptoms consistent with diagnosis of right knee stiffness/pain. Rehab potential is good at this time. Key impairments include: decreased ROM and strength of the right lower extremity, poor balance and compensatory gait patterning, increased swelling, and pain with functional activities such as squatting, walking, lunging, and stairs. Skilled PT is required to address these key impairments and to provide and progress with an appropriate home exercise program. The patient's complexity may change due to the nature of the patients presentation as well as the comorbidities and medical factors included in this patient's evaluation. GOALS:   Short Term Goals: To be achieved in: 2 weeks  1. Independent in HEP and progression per patient tolerance, in order to prevent re-injury. [] Progressing: [] Met: [] Not Met: [] Adjusted       2. Patient will have a decrease in pain to facilitate improvement in movement, function, and ADLs as indicated by Functional Deficits. [] Progressing: [] Met: [] Not Met: [] Adjusted          Long Term Goals: To be achieved in: 12 weeks  1.  LEFS score will be below 20 to assist with reaching prior level of function. [] Progressing: [] Met: [] Not Met: [] Adjusted      2. Patient will demonstrate increased AROM to equal the opposite side bilaterally to allow for proper joint functioning as indicated by patients Functional Deficits. [] Progressing: [] Met: [] Not Met: [] Adjusted       3. Patient will demonstrate an increase in strength to 4+/5 to match bilaterally to allow for proper functional mobility as indicated by patients Functional Deficits. [] Progressing: [] Met: [] Not Met: [] Adjusted       4. Patient will return to all transfers, work activities, and functional activities without increased symptoms or restriction. [] Progressing: [] Met: [] Not Met: [] Adjusted       5. Patient will have 0/10 pain with ADL's.  [] Progressing: [] Met: [] Not Met: [] Adjusted       6. Patient stated goal: Return to a walking program each day with no limitations or assistive devices. [] Progressing: [] Met: [] Not Met: [] Adjusted      Overall Progression Towards Functional goals/ Treatment Progress Update:  [] Patient is progressing as expected towards functional goals listed. [] Progression is slowed due to complexities/Impairments listed. [] Progression has been slowed due to co-morbidities.   [x] Plan just implemented, too soon to assess goals progression <30days   [] Goals require adjustment due to lack of progress  [] Patient is not progressing as expected and requires additional follow up with physician  [] Other    Prognosis for POC: [x] Good [] Fair  [] Poor    Patient requires continued skilled intervention: [x] Yes  [] No    Treatment/Activity Tolerance:  [x] Patient able to complete treatment  [] Patient limited by fatigue  [] Patient limited by pain    [] Patient limited by other medical complications  [] Other:     Return to Play: (if applicable)   []  Stage 1: Intro to Strength   []  Stage 2: Return to Run and Strength   []  Stage 3: Return to Jump and Strength   []  Stage 4: Dynamic Strength and Agility   []  Stage 5: Sport Specific Training     []  Ready to Return to Play, Meets All Above Stages   []  Not Ready for Return to Sports   Comments:                         PLAN: See eval  [] Continue per plan of care [] Alter current plan (see comments above)  [x] Plan of care initiated [] Hold pending MD visit [] Discharge    Electronically signed by:  Angel Dickerson PT    Note: If patient does not return for scheduled/ recommended follow up visits, this note will serve as a discharge from care along with most recent update on progress.

## 2022-12-28 NOTE — PLAN OF CARE
[facility], [address], [address] [place] [address] [address]  Phone: [phone]   Fax:     [phone]     Physical Therapy Certification    Dear Johana Coats MD ,    We had the pleasure of evaluating the following patient for physical therapy services at [address] [place] [address]. A summary of our findings can be found in the initial assessment below. This includes our plan of care. If you have any questions or concerns regarding these findings, please do not hesitate to contact me at the office phone number checked above. Thank you for the referral.       Physician Signature:_______________________________Date:__________________  By signing above (or electronic signature), therapists plan is approved by physician    Patient: Kobi Childs   : 1980   MRN: [id number]  Referring Physician: Johana Coats MD       Evaluation Date: 2022      Medical Diagnosis Information:  Other tear of medial meniscus of right knee as current injury, initial encounter [S83.241A]   Right knee PMM on 22                                        Insurance information:  [employer]     Precautions/ Contra-indications/Relevant Medical History:     C-SSRS Triggered by Intake questionnaire (Past 2 wk assessment):   [x] No, Questionnaire did not trigger screening.   [] Yes, Patient intake triggered further evaluation      [] C-SSRS Screening completed  [] PCP notified via Plan of Care  [] Emergency services notified     Latex Allergy:  [x]NO      []YES  Preferred Language for Healthcare:   [x]English       []other:    SUBJECTIVE: Patient stated complaint:patient is s/p R knee PMM on 22    LEFS Score: 24/80 / 70%    Pain Scale: 3-4/10  Easing factors: Rest, Ice, Heat, altering sleeping habits, compensating gait patterns. Provocative factors: walking/running long distances, stairs, kneeling, squatting.       Type: [x]Constant []Intermittent  []Radiating []Localized []other:     Numbness/Tingling: None    Occupation/School: [employer] - Stressed loads    Living Status/Prior Level of Function: Independent with ADLs and IADLs    OBJECTIVE:     ROM Involved Un-Involved   HIP Flex  WNL ? HIP Abd     HIP Ext     HIP IR     HIP ER     Knee ext 0 °     Knee Flex 80 °     Ankle PF     Ankle DF     Ankle In     Ankle Ev     Strength  Involved Un-Involved   HIP Flexors NT ?  5/5   HIP Abductors  5/5   HIP Ext  5/5   Hip ER  5/5   Knee EXT (quad)  5/5   Knee Flex (HS)  5/5   Ankle DF     Ankle PF     Ankle Inv     Ankle EV            Reflexes/Sensation:    []Dermatomes/Myotomes intact    []Reflexes equal and normal bilaterally   []Other:    Joint mobility:    [x]Normal    []Hypo   []Hyper    Palpation/Observation: Tenderness with palpation throughout. Functional Mobility/Transfers: Independent with SBA    Posture: WNL    Bandages/Dressings/Incisions: NA    Gait: (include devices/WB status) Compensating gait pattern, decreased stride length on the involved side. Orthopedic Special Tests: NA                       [x] Patient history, allergies, meds reviewed. Medical chart reviewed. See intake form. Review Of Systems (ROS):  [x]Performed Review of systems (Integumentary, CardioPulmonary, Neurological) by intake and observation. Intake form has been scanned into medical record. Patient has been instructed to contact their primary care physician regarding ROS issues if not already being addressed at this time.       Co-morbidities/Complexities (which will affect course of rehabilitation):   []None           Arthritic conditions   []Rheumatoid arthritis (M05.9)  [x]Osteoarthritis (M19.91)   Cardiovascular conditions   []Hypertension (I10)  []Hyperlipidemia (E78.5)  []Angina pectoris (I20)  []Atherosclerosis (I70)   Musculoskeletal conditions   []Disc pathology   []Congenital spine pathologies   []Prior surgical intervention  []Osteoporosis (M81.8)  []Osteopenia (M85.8)   Endocrine conditions   []Hypothyroid (E03.9)  []Hyperthyroid Gastrointestinal conditions   []Constipation (V75.52)   Metabolic conditions   [x]Morbid obesity (E66.01)  []Diabetes type 1(E10.65) or 2 (E11.65)   []Neuropathy (G60.9)     Pulmonary conditions   []Asthma (J45)  []Coughing   []COPD (J44.9)   Psychological Disorders  []Anxiety (F41.9)  []Depression (F32.9)   []Other:   []Other:          Barriers to/and or personal factors that will affect rehab potential:              []Age  []Sex              []Motivation/Lack of Motivation                        []Co-Morbidities              []Cognitive Function, education/learning barriers              []Environmental, home barriers              []profession/work barriers  []past PT/medical experience  []other:  Justification:     Falls Risk Assessment (30 days):   [x] Falls Risk assessed and no intervention required.   [] Falls Risk assessed and Patient requires intervention due to being higher risk   TUG score (>12s at risk):     [] Falls education provided    ASSESSMENT:   Functional Impairments:     []Noted lumbar/proximal hip/LE joint hypomobility   [x]Decreased LE functional ROM   [x]Decreased core/proximal hip strength and neuromuscular control   [x]Decreased LE functional strength   [x]Reduced balance/proprioceptive control   []other:      Functional Activity Limitations (from functional questionnaire and intake)   []Reduced ability to tolerate prolonged functional positions   []Reduced ability or difficulty with changes of positions or transfers between positions   []Reduced ability to maintain good posture and demonstrate good body mechanics with sitting, bending, and lifting   [x]Reduced ability to sleep   [x] Reduced ability or tolerance with driving and/or computer work   [x]Reduced ability to perform lifting, carrying tasks   [x]Reduced ability to squat   [x]Reduced ability to forward bend   [x]Reduced ability to ambulate prolonged functional periods/distances/surfaces   [x]Reduced ability to ascend/descend stairs   [x]Reduced ability to run, hop, cut or jump   []other:    Participation Restrictions   []Reduced participation in self care activities   [x]Reduced participation in home management activities   []Reduced participation in work activities   [x]Reduced participation in social activities. []Reduced participation in sport/recreation activities. Classification :     [x]Signs/symptoms consistent with post-surgical status including decreased ROM, strength and function.    []Signs/symptoms consistent with joint sprain/strain   []Signs/symptoms consistent with patella-femoral syndrome   []Signs/symptoms consistent with knee OA/hip OA   []Signs/symptoms consistent with internal derangement of knee/Hip   []Signs/symptoms consistent with functional hip weakness/NMR control      []Signs/symptoms consistent with tendinitis/tendinosis    []signs/symptoms consistent with pathology which may benefit from Dry needling      []other:      Prognosis/Rehab Potential:      []Excellent   [x]Good    []Fair   []Poor    Tolerance of evaluation/treatment:    []Excellent   [x]Good    []Fair   []Poor    Physical Therapy Evaluation Complexity Justification   [x] A history of present problem with:  [] no personal factors and/or comorbidities that impact the plan of care;  [x]1-2 personal factors and/or comorbidities that impact the plan of care  []3 personal factors and/or comorbidities that impact the plan of care  [x] An examination of body systems using standardized tests and measures addressing any of the following: body structures and functions (impairments), activity limitations, and/or participation restrictions;:  [] a total of 1-2 or more elements   [x] a total of 3 or more elements   [] a total of 4 or more elements   [x] A clinical presentation with:  [x] stable and/or uncomplicated characteristics   [] evolving clinical presentation with changing characteristics  [] unstable and unpredictable characteristics;   [x] Clinical decision making of [x] low, [] moderate, [] high complexity using standardized patient assessment instrument and/or measurable assessment of functional outcome. [x] EVAL (LOW) 81533 (typically 20 minutes face-to-face)  [] EVAL (MOD) 47591 (typically 30 minutes face-to-face)  [] EVAL (HIGH) 10492 (typically 45 minutes face-to-face)  [] RE-EVAL     PLAN  Frequency/Duration:  1-2 days per week for 12 weeks:  Interventions:  [x]  Therapeutic exercise including: strength training, ROM, for Lower extremity and core   [x]  NMR activation and proprioception for LE, Glutes and Core   [x]  Manual therapy as indicated for LE, Hip and spine to include: Dry Needling/IASTM, STM, PROM, Gr I-IV mobilizations, manipulation. [x] Modalities as needed that may include: thermal agents, E-stim, Biofeedback, US, iontophoresis as indicated  [x] Patient education on joint protection, postural re-education, activity modification, progression of HEP. HEP instruction: Refer to [address] [place] [address] access code and exercises on the 1st visit treatment note    GOALS:    Short Term Goals: To be achieved in: 2 weeks  1. Independent in HEP and progression per patient tolerance, in order to prevent re-injury. [] Progressing: [] Met: [] Not Met: [] Adjusted   2. Patient will have a decrease in pain to facilitate improvement in movement, function, and ADLs as indicated by Functional Deficits. [] Progressing: [] Met: [] Not Met: [] Adjusted     Long Term Goals: To be achieved in: 12 weeks  1. LEFS score will be below 20 to assist with reaching prior level of function. [] Progressing: [] Met: [] Not Met: [] Adjusted   2. Patient will demonstrate increased AROM to equal the opposite side bilaterally to allow for proper joint functioning as indicated by patients Functional Deficits. [] Progressing: [] Met: [] Not Met: [] Adjusted   3.  Patient will demonstrate an increase in strength to 4+/5 to match bilaterally to allow for proper functional mobility as indicated by patients Functional Deficits. [] Progressing: [] Met: [] Not Met: [] Adjusted   4. Patient will return to all transfers, work activities, and functional activities without increased symptoms or restriction. [] Progressing: [] Met: [] Not Met: [] Adjusted   5. Patient will have 0/10 pain with ADL's.  [] Progressing: [] Met: [] Not Met: [] Adjusted   6. Patient stated goal: Return to a walking program each day with no limitations or assistive devices.    [] Progressing: [] Met: [] Not Met: [] Adjusted     Electronically signed by:  Laina Garcia PT

## 2023-01-04 ENCOUNTER — OFFICE VISIT (OUTPATIENT)
Dept: ORTHOPEDIC SURGERY | Age: 43
End: 2023-01-04

## 2023-01-04 VITALS — BODY MASS INDEX: 38.19 KG/M2 | HEIGHT: 68 IN | WEIGHT: 252 LBS | RESPIRATION RATE: 16 BRPM

## 2023-01-04 DIAGNOSIS — S83.231D COMPLEX TEAR OF MEDIAL MENISCUS OF RIGHT KNEE AS CURRENT INJURY, SUBSEQUENT ENCOUNTER: Primary | ICD-10-CM

## 2023-01-04 PROCEDURE — 99024 POSTOP FOLLOW-UP VISIT: CPT | Performed by: ORTHOPAEDIC SURGERY

## 2023-01-04 NOTE — PROGRESS NOTES
Knee Arthroscopy Follow-up  Bertin Miller is here for follow up after right knee arthroscopic surgery. Surgery date was 12/21/22. 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 Pain is controlled without any medications. The patient's pain is rated at 6/10. The patient denies fever, wound drainage, increasing redness, pus, increasing swelling. Post op problems reported: none. She states she has been to PT. Physical Examination:  Resp 16   Ht 5' 8\" (1.727 m)   Wt 252 lb (114.3 kg)   BMI 38.32 kg/m²    Patient is awake, alert, and in no acute distress. The incisions are clean, dry, no drainage. There is no warmth, erythema, or purulent drainage over the incisions. Assessment:   2 weeks status post Right knee diagnostic arthroscopy, Partial medial meniscectomy, Chondroplasty patella/medial femoral condyle, excision suprapatellar plica      Plan:   Sutures were removed. Steri-strips and mastisol were applied. Patient may submerge incision under water at 4 weeks after surgery. Continue physical therapy. Refill pain medications as needed. OTC NSAIDs prn. .    No driving while on pain medications if it causes impairment. No driving until able to move leg to use gas / brake. Return to office at the 6 week postop time period for evaluation of progress or prn if problems. Garrison Mascorro. Catalina Rivera MD  Orthopaedic Surgery and Sports Medicine       This note was generated with use of a verbal recognition program and was checked for errors. It is possible that there are still dictated errors within this office note. If so, please bring any errors to my attention for an addendum. All efforts were made to ensure that this office note is accurate.

## 2023-01-09 ENCOUNTER — HOSPITAL ENCOUNTER (OUTPATIENT)
Dept: PHYSICAL THERAPY | Age: 43
Setting detail: THERAPIES SERIES
Discharge: HOME | End: 2023-01-09
Payer: COMMERCIAL

## 2023-01-09 PROCEDURE — 97112 NEUROMUSCULAR REEDUCATION: CPT

## 2023-01-09 PROCEDURE — 97110 THERAPEUTIC EXERCISES: CPT

## 2023-01-09 PROCEDURE — 97140 MANUAL THERAPY 1/> REGIONS: CPT

## 2023-01-11 ENCOUNTER — HOSPITAL ENCOUNTER (OUTPATIENT)
Dept: PHYSICAL THERAPY | Age: 43
Setting detail: THERAPIES SERIES
Discharge: HOME | End: 2023-01-11
Payer: COMMERCIAL

## 2023-01-11 PROCEDURE — 97016 VASOPNEUMATIC DEVICE THERAPY: CPT | Performed by: SPECIALIST/TECHNOLOGIST

## 2023-01-11 PROCEDURE — 97110 THERAPEUTIC EXERCISES: CPT | Performed by: SPECIALIST/TECHNOLOGIST

## 2023-01-11 PROCEDURE — 97140 MANUAL THERAPY 1/> REGIONS: CPT | Performed by: SPECIALIST/TECHNOLOGIST

## 2023-01-11 PROCEDURE — 97112 NEUROMUSCULAR REEDUCATION: CPT | Performed by: SPECIALIST/TECHNOLOGIST

## 2023-01-11 NOTE — PROGRESS NOTES
[facility], [address] [place] [address], [address] [place] [address] [address]  Phone: [phone]   Fax:     [phone]    Physical Therapy Treatment Note/ Progress Report:     Date:  2023    Patient Name:  Agustin Ho    :  1980  MRN: [id number]  Restrictions/Precautions:    Medical/Treatment Diagnosis Information:  Other tear of medial meniscus of right knee as current injury, initial encounter [H00.949J]  Right knee PMM on   Insurance/Certification information:   [facility]  Physician Information:  Gonzalez Heredia MD   Has the plan of care been signed (Y/N):        []  Yes  [x]  No     Date of Patient follow up with Physician: 23    Is this a Progress Report:     []  Yes  [x]  No      If Yes:  Date Range for reporting period:  Initial Eval: 2022  Beginnin2022 --- Endin23    Progress report will be due (10 Rx or 30 days whichever is less): 5/15/10     Recertification will be due (POC Duration  / 90 days whichever is less): 3/28/23      Visit # Insurance Allowable Auth Required   In Person 3 24 (until 3/17/23)  96 units []  Yes     []  No    Tele Health 0  []  Yes     []  No    Total 3       LEFS Score: 70%     Date assessed: 2022      Latex Allergy:  [x]NO      []YES  Preferred Language for Healthcare:   [x]English       []other:    Pain level:  8/10     SUBJECTIVE:   Pt notes her knee is a little more sore today. Notes she walked at lunch and the weather may cause increased sx.      OBJECTIVE:   Observation:  0-115 knee ROM  Test measurements: See Eval    RESTRICTIONS/PRECAUTIONS:     Exercises/Interventions:   Therapeutic Ex (25257)  Therapeutic Activity (85405)  NMR re-education (19238) Sets/Reps Notes/CUES   Bike ROM 5 min         QS x20    Long sitting HSS 3x30\"    Long sitting gastroc 3x30\"    SLR 2x10    SSLR 2x10    Heel prop  Heel slide flex 1 min  x5          Calf stretch incline 30\"x3    HR 3x10    Leg press B 80# 3x10    Knee ext 10# 2x10 Recc    HS curl 40# 3x10     MICHELLE- TKE  ABD 45# 2x10R  80# x 30                                            Manual Intervention (88927)     Patella mobs / flexion / quad stm 8 min                                                 Patient Education 5 min Provided biomechanics/ergonomics training to reduce stress across injured/healing structures. Therapeutic Exercise and NMR EXR  [x] (73152) Provided verbal/tactile cueing for activities related to strengthening, flexibility, endurance, ROM for improvements in LE, proximal hip, and core control with self care, mobility, lifting, ambulation. [x] (94769) Provided verbal/tactile cueing for activities related to improving balance, coordination, kinesthetic sense, posture, motor skill, proprioception to assist with LE, proximal hip, and core control in self-care, mobility, lifting, ambulation and eccentric single leg control. NMR and Therapeutic Activities:    [x] (85527 or 63651) Provided verbal/tactile cueing for activities related to improving balance, coordination, kinesthetic sense, posture, motor skill, proprioception and motor activation to allow for proper function of core, proximal hip and LE with self-care and ADLs and functional mobility.    [x] (86698) Gait Re-education- Provided training and instruction to the patient for proper LE, core and proximal hip recruitment and positioning and eccentric body weight control with ambulation re-education including up and down stairs     Home Exercise Program:    [x] (28179) Reviewed/Progressed HEP activities related to strengthening, flexibility, endurance, ROM of core, proximal hip and LE for functional self-care, mobility, lifting and ambulation/stair navigation   [x] (84751) Reviewed/Progressed HEP activities related to improving balance, coordination, kinesthetic sense, posture, motor skill, proprioception of core, proximal hip and LE for self-care, mobility, lifting, and ambulation/stair navigation      Manual Treatments:  PROM / STM / Oscillations-Mobs:  G-I, II, III, IV (PA's, Inf., Post.)  [x] (25801) Provided manual therapy to mobilize LE, proximal hip and/or LS spine soft tissue/joints for the purpose of modulating pain, promoting relaxation, increasing ROM, reducing/eliminating soft tissue swelling/inflammation/restriction, improving soft tissue extensibility and allowing for proper ROM for normal function with self-care, mobility, lifting and ambulation. Modalities:      Charges:  Timed Code Treatment Minutes: 38   Total Treatment Minutes:  48   BWC:  TE TIME:  NMR TIME:  MANUAL TIME:  UNTIMED MINUTES:  Medicare Total:                  [] EVAL (LOW) 97894 (typically 20 minutes face-to-face)  [] EVAL (MOD) 56808 (typically 30 minutes face-to-face)  [] EVAL (HIGH) 84454 (typically 45 minutes face-to-face)  [] RE-EVAL     [x] UO(52682) x    1 [] IONTO  [x] NMR (60691) x    1 [x] VASO  [x] Manual (49740) x   1 [] Other:  [] TA x      [] Mech Traction (62869)  [] ES(attended) (76070)      [] ES (un) (31445):    ASSESSMENT:  No complaints with above routine with addition of some machines. Pt fatigues quickly with hip ex. Patient presents with signs and symptoms consistent with diagnosis of right knee stiffness/pain. Rehab potential is good at this time. Key impairments include: decreased ROM and strength of the right lower extremity, poor balance and compensatory gait patterning, increased swelling, and pain with functional activities such as squatting, walking, lunging, and stairs. Skilled PT is required to address these key impairments and to provide and progress with an appropriate home exercise program. The patient's complexity may change due to the nature of the patients presentation as well as the comorbidities and medical factors included in this patient's evaluation. GOALS:   Short Term Goals: To be achieved in: 2 weeks  1.  Independent in HEP and progression per patient tolerance, in order to prevent re-injury. [] Progressing: [] Met: [] Not Met: [] Adjusted       2. Patient will have a decrease in pain to facilitate improvement in movement, function, and ADLs as indicated by Functional Deficits. [] Progressing: [] Met: [] Not Met: [] Adjusted          Long Term Goals: To be achieved in: 12 weeks  1. LEFS score will be below 20 to assist with reaching prior level of function. [] Progressing: [] Met: [] Not Met: [] Adjusted      2. Patient will demonstrate increased AROM to equal the opposite side bilaterally to allow for proper joint functioning as indicated by patients Functional Deficits. [] Progressing: [] Met: [] Not Met: [] Adjusted       3. Patient will demonstrate an increase in strength to 4+/5 to match bilaterally to allow for proper functional mobility as indicated by patients Functional Deficits. [] Progressing: [] Met: [] Not Met: [] Adjusted       4. Patient will return to all transfers, work activities, and functional activities without increased symptoms or restriction. [] Progressing: [] Met: [] Not Met: [] Adjusted       5. Patient will have 0/10 pain with ADL's.  [] Progressing: [] Met: [] Not Met: [] Adjusted       6. Patient stated goal: Return to a walking program each day with no limitations or assistive devices. [] Progressing: [] Met: [] Not Met: [] Adjusted      Overall Progression Towards Functional goals/ Treatment Progress Update:  [] Patient is progressing as expected towards functional goals listed. [] Progression is slowed due to complexities/Impairments listed. [] Progression has been slowed due to co-morbidities.   [x] Plan just implemented, too soon to assess goals progression <30days   [] Goals require adjustment due to lack of progress  [] Patient is not progressing as expected and requires additional follow up with physician  [] Other    Prognosis for POC: [x] Good [] Fair  [] Poor    Patient requires continued skilled intervention: [x] Yes  [] No    Treatment/Activity Tolerance:  [x] Patient able to complete treatment  [] Patient limited by fatigue  [] Patient limited by pain    [] Patient limited by other medical complications  [] Other:     Return to Play: (if applicable)   []  Stage 1: Intro to Strength   []  Stage 2: Return to Run and Strength   []  Stage 3: Return to Jump and Strength   []  Stage 4: Dynamic Strength and Agility   []  Stage 5: Sport Specific Training     []  Ready to Return to Play, Meets All Above Stages   []  Not Ready for Return to Sports   Comments:                         PLAN: See eval  [x] Continue per plan of care [] Alter current plan (see comments above)  [] Plan of care initiated [] Hold pending MD visit [] Discharge    Electronically signed by:  Pat Tran PTA, MHI, ATC    Note: If patient does not return for scheduled/ recommended follow up visits, this note will serve as a discharge from care along with most recent update on progress.

## 2023-01-17 ENCOUNTER — HOSPITAL ENCOUNTER (OUTPATIENT)
Dept: PHYSICAL THERAPY | Age: 43
Setting detail: THERAPIES SERIES
Discharge: HOME | End: 2023-01-17
Payer: COMMERCIAL

## 2023-01-17 PROCEDURE — 97016 VASOPNEUMATIC DEVICE THERAPY: CPT | Performed by: SPECIALIST/TECHNOLOGIST

## 2023-01-17 PROCEDURE — 97140 MANUAL THERAPY 1/> REGIONS: CPT | Performed by: SPECIALIST/TECHNOLOGIST

## 2023-01-17 PROCEDURE — 97110 THERAPEUTIC EXERCISES: CPT | Performed by: SPECIALIST/TECHNOLOGIST

## 2023-01-17 PROCEDURE — 97112 NEUROMUSCULAR REEDUCATION: CPT | Performed by: SPECIALIST/TECHNOLOGIST

## 2023-01-17 NOTE — PROGRESS NOTES
[facility], [place] [address] [place] [address]  Phone: [phone]   Fax:     [phone]    Physical Therapy Treatment Note/ Progress Report:     Date:  2023    Patient Name:  Bertin Ham    :  1980  MRN: [id number]  Restrictions/Precautions:    Medical/Treatment Diagnosis Information:  Other tear of medial meniscus of right knee as current injury, initial encounter [S83.241A]  Right knee PMM on 22  Insurance/Certification information:   [facility]  Physician Information:  George Solorio MD   Has the plan of care been signed (Y/N):        []  Yes  [x]  No     Date of Patient follow up with Physician: 23    Is this a Progress Report:     []  Yes  [x]  No      If Yes:  Date Range for reporting period:  Initial Eval: 2022  Beginnin2022 --- Endin23    Progress report will be due (10 Rx or 30 days whichever is less): 23     Recertification will be due (POC Duration  / 90 days whichever is less): 3/28/23      Visit # Insurance Allowable Auth Required   In Person 4 24 (until 3/17/23)  96 units []  Yes     []  No    Tele Health 0  []  Yes     []  No    Total 4       LEFS Score: 70%     Date assessed: 2022      Latex Allergy:  [x]NO      []YES  Preferred Language for Healthcare:   [x]English       []other:    Pain level:  8/10     SUBJECTIVE:   Pt notes she is more sore today.  She isn't sure why.   OBJECTIVE:   Observation:  0-115 knee ROM  Test measurements: See Eval    RESTRICTIONS/PRECAUTIONS:     Exercises/Interventions:   Therapeutic Ex (27963)  Therapeutic Activity (08208)  NMR re-education (04049) Sets/Reps Notes/CUES   Bike ROM 5 min         QS x20    Long sitting HSS 3x30\"    Long sitting gastroc 3x30\"    SLR 2x10    SSLR 2x10    Heel prop  Heel slide flex 1 min  x5          Calf stretch incline 30\"x3    HR 3x10    Leg press B 80# 3x10    Knee ext 10# 2x10 Recc    HS  curl 40# 3x10     MICHELLE- TKE  ABD 45# 2x10R  80# x 30                                            Manual Intervention (93456)     Patella mobs / flexion / quad stm 8 min                                                 Patient Education 5 min Provided biomechanics/ergonomics training to reduce stress across injured/healing structures.       Therapeutic Exercise and NMR EXR  [x] (80137) Provided verbal/tactile cueing for activities related to strengthening, flexibility, endurance, ROM for improvements in LE, proximal hip, and core control with self care, mobility, lifting, ambulation.  [x] (76443) Provided verbal/tactile cueing for activities related to improving balance, coordination, kinesthetic sense, posture, motor skill, proprioception to assist with LE, proximal hip, and core control in self-care, mobility, lifting, ambulation and eccentric single leg control.     NMR and Therapeutic Activities:    [x] (17873 or 45327) Provided verbal/tactile cueing for activities related to improving balance, coordination, kinesthetic sense, posture, motor skill, proprioception and motor activation to allow for proper function of core, proximal hip and LE with self-care and ADLs and functional mobility.   [x] (39967) Gait Re-education- Provided training and instruction to the patient for proper LE, core and proximal hip recruitment and positioning and eccentric body weight control with ambulation re-education including up and down stairs     Home Exercise Program:    [x] (66815) Reviewed/Progressed HEP activities related to strengthening, flexibility, endurance, ROM of core, proximal hip and LE for functional self-care, mobility, lifting and ambulation/stair navigation   [x] (72453) Reviewed/Progressed HEP activities related to improving balance, coordination, kinesthetic sense, posture, motor skill, proprioception of core, proximal hip and LE for self-care, mobility, lifting, and ambulation/stair navigation      Manual  Treatments:  PROM / STM / Oscillations-Mobs:  G-I, II, III, IV (PA's, Inf., Post.)  [x] (40446) Provided manual therapy to mobilize LE, proximal hip and/or LS spine soft tissue/joints for the purpose of modulating pain, promoting relaxation, increasing ROM, reducing/eliminating soft tissue swelling/inflammation/restriction, improving soft tissue extensibility and allowing for proper ROM for normal function with self-care, mobility, lifting and ambulation. Modalities:      Charges:  Timed Code Treatment Minutes: 38   Total Treatment Minutes:  48   BWC:  TE TIME:  NMR TIME:  MANUAL TIME:  UNTIMED MINUTES:  Medicare Total:                  [] EVAL (LOW) 11905 (typically 20 minutes face-to-face)  [] EVAL (MOD) 98633 (typically 30 minutes face-to-face)  [] EVAL (HIGH) 52137 (typically 45 minutes face-to-face)  [] RE-EVAL     [x] GG(08176) x    1 [] IONTO  [x] NMR (82562) x    1 [x] VASO  [x] Manual (66254) x   1 [] Other:  [] TA x      [] Mech Traction (28210)  [] ES(attended) (45086)      [] ES (un) (89002):    ASSESSMENT:  No complaints with above routine with addition of some machines. Pt fatigues quickly with hip ex. Patient presents with signs and symptoms consistent with diagnosis of right knee stiffness/pain. Rehab potential is good at this time. Key impairments include: decreased ROM and strength of the right lower extremity, poor balance and compensatory gait patterning, increased swelling, and pain with functional activities such as squatting, walking, lunging, and stairs. Skilled PT is required to address these key impairments and to provide and progress with an appropriate home exercise program. The patient's complexity may change due to the nature of the patients presentation as well as the comorbidities and medical factors included in this patient's evaluation. GOALS:   Short Term Goals: To be achieved in: 2 weeks  1.  Independent in HEP and progression per patient tolerance, in order to prevent re-injury. [] Progressing: [] Met: [] Not Met: [] Adjusted       2. Patient will have a decrease in pain to facilitate improvement in movement, function, and ADLs as indicated by Functional Deficits. [] Progressing: [] Met: [] Not Met: [] Adjusted          Long Term Goals: To be achieved in: 12 weeks  1. LEFS score will be below 20 to assist with reaching prior level of function. [] Progressing: [] Met: [] Not Met: [] Adjusted      2. Patient will demonstrate increased AROM to equal the opposite side bilaterally to allow for proper joint functioning as indicated by patients Functional Deficits. [] Progressing: [] Met: [] Not Met: [] Adjusted       3. Patient will demonstrate an increase in strength to 4+/5 to match bilaterally to allow for proper functional mobility as indicated by patients Functional Deficits. [] Progressing: [] Met: [] Not Met: [] Adjusted       4. Patient will return to all transfers, work activities, and functional activities without increased symptoms or restriction. [] Progressing: [] Met: [] Not Met: [] Adjusted       5. Patient will have 0/10 pain with ADL's.  [] Progressing: [] Met: [] Not Met: [] Adjusted       6. Patient stated goal: Return to a walking program each day with no limitations or assistive devices. [] Progressing: [] Met: [] Not Met: [] Adjusted      Overall Progression Towards Functional goals/ Treatment Progress Update:  [] Patient is progressing as expected towards functional goals listed. [] Progression is slowed due to complexities/Impairments listed. [] Progression has been slowed due to co-morbidities.   [x] Plan just implemented, too soon to assess goals progression <30days   [] Goals require adjustment due to lack of progress  [] Patient is not progressing as expected and requires additional follow up with physician  [] Other    Prognosis for POC: [x] Good [] Fair  [] Poor    Patient requires continued skilled intervention: [x] Yes  [] No    Treatment/Activity Tolerance:  [x] Patient able to complete treatment  [] Patient limited by fatigue  [] Patient limited by pain    [] Patient limited by other medical complications  [] Other:     Return to Play: (if applicable)   []  Stage 1: Intro to Strength   []  Stage 2: Return to Run and Strength   []  Stage 3: Return to Jump and Strength   []  Stage 4: Dynamic Strength and Agility   []  Stage 5: Sport Specific Training     []  Ready to Return to Play, Meets All Above Stages   []  Not Ready for Return to Sports   Comments:                         PLAN: See eval  [x] Continue per plan of care [] Alter current plan (see comments above)  [] Plan of care initiated [] Hold pending MD visit [] Discharge    Electronically signed by:  Cele Snyder, PTA, MHI, ATC    Note: If patient does not return for scheduled/ recommended follow up visits, this note will serve as a discharge from care along with most recent update on progress.

## 2023-01-19 ENCOUNTER — APPOINTMENT (OUTPATIENT)
Dept: PHYSICAL THERAPY | Age: 43
End: 2023-01-19
Payer: COMMERCIAL

## 2023-01-24 ENCOUNTER — HOSPITAL ENCOUNTER (OUTPATIENT)
Dept: PHYSICAL THERAPY | Age: 43
Setting detail: THERAPIES SERIES
Discharge: HOME | End: 2023-01-24
Payer: COMMERCIAL

## 2023-01-24 PROCEDURE — 97140 MANUAL THERAPY 1/> REGIONS: CPT | Performed by: SPECIALIST/TECHNOLOGIST

## 2023-01-24 PROCEDURE — 97110 THERAPEUTIC EXERCISES: CPT | Performed by: SPECIALIST/TECHNOLOGIST

## 2023-01-24 PROCEDURE — 97016 VASOPNEUMATIC DEVICE THERAPY: CPT | Performed by: SPECIALIST/TECHNOLOGIST

## 2023-01-24 PROCEDURE — 97112 NEUROMUSCULAR REEDUCATION: CPT | Performed by: SPECIALIST/TECHNOLOGIST

## 2023-01-24 NOTE — PROGRESS NOTES
[address] [facility], [address] [place] [address], [address] [place] [address] [address]  Phone: [phone]   Fax:     [phone]    Physical Therapy Treatment Note/ Progress Report:     Date:  2023    Patient Name:  Frank Rausch    :  1980  MRN: [id number]  Restrictions/Precautions:    Medical/Treatment Diagnosis Information:  Other tear of medial meniscus of right knee as current injury, initial encounter [H32.097T]  Right knee PMM on   Insurance/Certification information:   [facility]  Physician Information:  Heavenly Will MD   Has the plan of care been signed (Y/N):        []  Yes  [x]  No     Date of Patient follow up with Physician: 23    Is this a Progress Report:     []  Yes  [x]  No      If Yes:  Date Range for reporting period:  Initial Eval: 2022  Beginnin2022 --- Endin23    Progress report will be due (10 Rx or 30 days whichever is less):      Recertification will be due (POC Duration  / 90 days whichever is less): 3/28/23      Visit # Insurance Allowable Auth Required   In Person 5 24 (until 3/17/23)  96 units []  Yes     []  No    Tele Health 0  []  Yes     []  No    Total 5       LEFS Score: 70%     Date assessed: 2022      Latex Allergy:  [x]NO      []YES  Preferred Language for Healthcare:   [x]English       []other:    Pain level:  8/10     SUBJECTIVE:   Pt notes she is feeling pretty good overall. She has been inconsistent with her HEP and is a little concerned with steps.    OBJECTIVE:   Observation:  0-115 knee ROM  Test measurements: See Eval    RESTRICTIONS/PRECAUTIONS:     Exercises/Interventions:   Therapeutic Ex (54449)  Therapeutic Activity (61218)  NMR re-education (20586) Sets/Reps Notes/CUES   Bike ROM 5 min         QS x20    Long sitting HSS 3x30\"    Long sitting gastroc 3x30\"    SLR 2x10    SSLR 2x10    Heel prop  Heel slide flex 1 min  x5          Calf stretch incline 30\"x3 HR 3x10    Leg press B 80# 3x10    Knee ext 10# 2x10 Recc    HS curl 40# 3x10     MICHELLE- ABD/EXT            TKE   45# 3x10ea  80# x 30    FWD/LAT stepups X20 ea                                       Manual Intervention (42702)     Patella mobs / flexion / quad stm 8 min                                                 Patient Education 5 min Provided biomechanics/ergonomics training to reduce stress across injured/healing structures. Therapeutic Exercise and NMR EXR  [x] (25533) Provided verbal/tactile cueing for activities related to strengthening, flexibility, endurance, ROM for improvements in LE, proximal hip, and core control with self care, mobility, lifting, ambulation. [x] (98388) Provided verbal/tactile cueing for activities related to improving balance, coordination, kinesthetic sense, posture, motor skill, proprioception to assist with LE, proximal hip, and core control in self-care, mobility, lifting, ambulation and eccentric single leg control. NMR and Therapeutic Activities:    [x] (06611 or 66087) Provided verbal/tactile cueing for activities related to improving balance, coordination, kinesthetic sense, posture, motor skill, proprioception and motor activation to allow for proper function of core, proximal hip and LE with self-care and ADLs and functional mobility.    [x] (19532) Gait Re-education- Provided training and instruction to the patient for proper LE, core and proximal hip recruitment and positioning and eccentric body weight control with ambulation re-education including up and down stairs     Home Exercise Program:    [x] (47908) Reviewed/Progressed HEP activities related to strengthening, flexibility, endurance, ROM of core, proximal hip and LE for functional self-care, mobility, lifting and ambulation/stair navigation   [x] (94059) Reviewed/Progressed HEP activities related to improving balance, coordination, kinesthetic sense, posture, motor skill, proprioception of core, proximal hip and LE for self-care, mobility, lifting, and ambulation/stair navigation      Manual Treatments:  PROM / STM / Oscillations-Mobs:  G-I, II, III, IV (PA's, Inf., Post.)  [x] (98704) Provided manual therapy to mobilize LE, proximal hip and/or LS spine soft tissue/joints for the purpose of modulating pain, promoting relaxation, increasing ROM, reducing/eliminating soft tissue swelling/inflammation/restriction, improving soft tissue extensibility and allowing for proper ROM for normal function with self-care, mobility, lifting and ambulation. Modalities:      Charges:  Timed Code Treatment Minutes: 38   Total Treatment Minutes:  48   BWC:  TE TIME:  NMR TIME:  MANUAL TIME:  UNTIMED MINUTES:  Medicare Total:                  [] EVAL (LOW) 26512 (typically 20 minutes face-to-face)  [] EVAL (MOD) 19501 (typically 30 minutes face-to-face)  [] EVAL (HIGH) 07212 (typically 45 minutes face-to-face)  [] RE-EVAL     [x] SF(02165) x    1 [] IONTO  [x] NMR (62647) x    1 [x] VASO  [x] Manual (94919) x   1 [] Other:  [] TA x      [] Mech Traction (10310)  [] ES(attended) (97723)      [] ES (un) (20153):    ASSESSMENT:  No complaints with above routine with addition of some machines. Pt fatigues quickly with hip ex. Patient presents with signs and symptoms consistent with diagnosis of right knee stiffness/pain. Rehab potential is good at this time. Key impairments include: decreased ROM and strength of the right lower extremity, poor balance and compensatory gait patterning, increased swelling, and pain with functional activities such as squatting, walking, lunging, and stairs. Skilled PT is required to address these key impairments and to provide and progress with an appropriate home exercise program. The patient's complexity may change due to the nature of the patients presentation as well as the comorbidities and medical factors included in this patient's evaluation. GOALS:   Short Term Goals:  To be achieved in: 2 weeks  1. Independent in HEP and progression per patient tolerance, in order to prevent re-injury. [] Progressing: [] Met: [] Not Met: [] Adjusted       2. Patient will have a decrease in pain to facilitate improvement in movement, function, and ADLs as indicated by Functional Deficits. [] Progressing: [] Met: [] Not Met: [] Adjusted          Long Term Goals: To be achieved in: 12 weeks  1. LEFS score will be below 20 to assist with reaching prior level of function. [] Progressing: [] Met: [] Not Met: [] Adjusted      2. Patient will demonstrate increased AROM to equal the opposite side bilaterally to allow for proper joint functioning as indicated by patients Functional Deficits. [] Progressing: [] Met: [] Not Met: [] Adjusted       3. Patient will demonstrate an increase in strength to 4+/5 to match bilaterally to allow for proper functional mobility as indicated by patients Functional Deficits. [] Progressing: [] Met: [] Not Met: [] Adjusted       4. Patient will return to all transfers, work activities, and functional activities without increased symptoms or restriction. [] Progressing: [] Met: [] Not Met: [] Adjusted       5. Patient will have 0/10 pain with ADL's.  [] Progressing: [] Met: [] Not Met: [] Adjusted       6. Patient stated goal: Return to a walking program each day with no limitations or assistive devices. [] Progressing: [] Met: [] Not Met: [] Adjusted      Overall Progression Towards Functional goals/ Treatment Progress Update:  [] Patient is progressing as expected towards functional goals listed. [] Progression is slowed due to complexities/Impairments listed. [] Progression has been slowed due to co-morbidities.   [x] Plan just implemented, too soon to assess goals progression <30days   [] Goals require adjustment due to lack of progress  [] Patient is not progressing as expected and requires additional follow up with physician  [] Other    Prognosis for POC: [x] Good [] Fair  [] Poor    Patient requires continued skilled intervention: [x] Yes  [] No    Treatment/Activity Tolerance:  [x] Patient able to complete treatment  [] Patient limited by fatigue  [] Patient limited by pain    [] Patient limited by other medical complications  [] Other:     Return to Play: (if applicable)   []  Stage 1: Intro to Strength   []  Stage 2: Return to Run and Strength   []  Stage 3: Return to Jump and Strength   []  Stage 4: Dynamic Strength and Agility   []  Stage 5: Sport Specific Training     []  Ready to Return to Play, Meets All Above Stages   []  Not Ready for Return to Sports   Comments:                         PLAN: See eval  [x] Continue per plan of care [] Alter current plan (see comments above)  [] Plan of care initiated [] Hold pending MD visit [] Discharge    Electronically signed by:  Venkatesh Mendoza PTA, MHI, ATC    Note: If patient does not return for scheduled/ recommended follow up visits, this note will serve as a discharge from care along with most recent update on progress.

## 2023-01-26 ENCOUNTER — HOSPITAL ENCOUNTER (OUTPATIENT)
Dept: PHYSICAL THERAPY | Age: 43
Setting detail: THERAPIES SERIES
Discharge: HOME | End: 2023-01-26
Payer: COMMERCIAL

## 2023-01-26 NOTE — PROGRESS NOTES
[facility], [address] [place] [address], [address] [place] [address] [address]  Phone: [phone]   Fax:     [phone]    Physical Therapy  Cancellation/No-show Note  Patient Name:  Esther Garcia  :  1980   Date:  2023    Cancelled visits to date: 1  No-shows to date: 0    For today's appointment patient:  [x]  Cancelled  [x]  Rescheduled appointment  []  No-show     Reason given by patient:  [x]  Patient ill  []  Conflicting appointment  []  No transportation    []  Conflict with work  []  No reason given  []  Other:     Comments:      Phone call information:   []  Phone call made today to patient at am/pm at the number provided:      []  Patient answered, conversation as follows:    []  Patient did not answer, message left as follows:  [x]  Phone call not needed - pt contacted us to cancel and provided reason for cancellation.      Electronically signed by:  Venkatesh Mendoza PTA, MHI, ATC

## 2023-02-08 ENCOUNTER — OFFICE VISIT (OUTPATIENT)
Dept: ORTHOPEDIC SURGERY | Age: 43
End: 2023-02-08

## 2023-02-08 VITALS — HEIGHT: 68 IN | WEIGHT: 250.2 LBS | RESPIRATION RATE: 16 BRPM | BODY MASS INDEX: 37.92 KG/M2

## 2023-02-08 DIAGNOSIS — S83.231D COMPLEX TEAR OF MEDIAL MENISCUS OF RIGHT KNEE AS CURRENT INJURY, SUBSEQUENT ENCOUNTER: Primary | ICD-10-CM

## 2023-02-08 PROCEDURE — 99024 POSTOP FOLLOW-UP VISIT: CPT | Performed by: STUDENT IN AN ORGANIZED HEALTH CARE EDUCATION/TRAINING PROGRAM

## 2023-02-15 ENCOUNTER — HOSPITAL ENCOUNTER (OUTPATIENT)
Dept: PHYSICAL THERAPY | Age: 43
Setting detail: THERAPIES SERIES
Discharge: HOME | End: 2023-02-15
Payer: COMMERCIAL

## 2023-02-15 PROCEDURE — 97016 VASOPNEUMATIC DEVICE THERAPY: CPT | Performed by: SPECIALIST/TECHNOLOGIST

## 2023-02-15 PROCEDURE — 97110 THERAPEUTIC EXERCISES: CPT | Performed by: SPECIALIST/TECHNOLOGIST

## 2023-02-15 PROCEDURE — 97112 NEUROMUSCULAR REEDUCATION: CPT | Performed by: SPECIALIST/TECHNOLOGIST

## 2023-02-15 NOTE — PROGRESS NOTES
[facility]23 [facility], [address] [place] [address], [address] [place] [address] [address]  Phone: [phone]   Fax:     [phone]    Physical Therapy Treatment Note/ Progress Report:     Date:  2/15/2023    Patient Name:  Jasmine Hoffmann    :  1980  MRN: [id number]  Restrictions/Precautions:    Medical/Treatment Diagnosis Information:  Other tear of medial meniscus of right knee as current injury, initial encounter [O14.388X]  Right knee PMM on   Insurance/Certification information:   [facility]  Physician Information:  Fazal Rust MD   Has the plan of care been signed (Y/N):        []  Yes  [x]  No     Date of Patient follow up with Physician: 23    Is this a Progress Report:     []  Yes  [x]  No      If Yes:  Date Range for reporting period:  Initial Eval: 2022  Beginnin2022 --- Endin23    Progress report will be due (10 Rx or 30 days whichever is less): 3/37/55     Recertification will be due (POC Duration  / 90 days whichever is less): 3/28/23      Visit # Insurance Allowable Auth Required   In Person 6  (until 3/17/23)  96 units []  Yes     []  No    Tele Health 0  []  Yes     []  No    Total 6       LEFS Score: 70%     Date assessed: 2022      Latex Allergy:  [x]NO      []YES  Preferred Language for Healthcare:   [x]English       []other:    Pain level:  8/10     SUBJECTIVE:   Pt notes she continues to have difficulty with steps. She saw MD and they wanted her to return to work on quad strength.    OBJECTIVE:   Observation:  0-115 knee ROM  Test measurements: See Eval    RESTRICTIONS/PRECAUTIONS:     Exercises/Interventions:   Therapeutic Ex (47412)  Therapeutic Activity (69848)  NMR re-education (63655) Sets/Reps Notes/CUES   Bike ROM 7 min         QS x20    Long sitting HSS 3x30\"    Long sitting gastroc 3x30\"    SLR 2x10    SSLR 2x10    Heel prop  Heel slide flex 1 min  x5          Calf stretch incline 30\"x3 HR 3x10    Leg press B 120# 3x10    Knee ext 10# 2x10 Recc    HS curl 40# 3x10     MICHELLE- ABD/EXT            TKE   45# 3x10ea  80# x 30    FWD/LAT stepups X20 ea    LSD x20 2\"   SLS 10\"x 5    Slr+ 10 second holds  5 pumps x2                       Manual Intervention (43714)     Patella mobs / flexion / quad stm 8 min                                                 Patient Education 5 min Provided biomechanics/ergonomics training to reduce stress across injured/healing structures. Therapeutic Exercise and NMR EXR  [x] (51860) Provided verbal/tactile cueing for activities related to strengthening, flexibility, endurance, ROM for improvements in LE, proximal hip, and core control with self care, mobility, lifting, ambulation. [x] (62390) Provided verbal/tactile cueing for activities related to improving balance, coordination, kinesthetic sense, posture, motor skill, proprioception to assist with LE, proximal hip, and core control in self-care, mobility, lifting, ambulation and eccentric single leg control. NMR and Therapeutic Activities:    [x] (94005 or 01845) Provided verbal/tactile cueing for activities related to improving balance, coordination, kinesthetic sense, posture, motor skill, proprioception and motor activation to allow for proper function of core, proximal hip and LE with self-care and ADLs and functional mobility.    [x] (65860) Gait Re-education- Provided training and instruction to the patient for proper LE, core and proximal hip recruitment and positioning and eccentric body weight control with ambulation re-education including up and down stairs     Home Exercise Program:    [x] (56950) Reviewed/Progressed HEP activities related to strengthening, flexibility, endurance, ROM of core, proximal hip and LE for functional self-care, mobility, lifting and ambulation/stair navigation   [x] (24454) Reviewed/Progressed HEP activities related to improving balance, coordination, kinesthetic sense, posture, motor skill, proprioception of core, proximal hip and LE for self-care, mobility, lifting, and ambulation/stair navigation      Manual Treatments:  PROM / STM / Oscillations-Mobs:  G-I, II, III, IV (PA's, Inf., Post.)  [x] (76476) Provided manual therapy to mobilize LE, proximal hip and/or LS spine soft tissue/joints for the purpose of modulating pain, promoting relaxation, increasing ROM, reducing/eliminating soft tissue swelling/inflammation/restriction, improving soft tissue extensibility and allowing for proper ROM for normal function with self-care, mobility, lifting and ambulation. Modalities:      Charges:  Timed Code Treatment Minutes: 38   Total Treatment Minutes:  48   BWC:  TE TIME:  NMR TIME:  MANUAL TIME:  UNTIMED MINUTES:  Medicare Total:                  [] EVAL (LOW) 85726 (typically 20 minutes face-to-face)  [] EVAL (MOD) 54797 (typically 30 minutes face-to-face)  [] EVAL (HIGH) 48377 (typically 45 minutes face-to-face)  [] RE-EVAL     [x] GS(00681) x    2 [] IONTO  [x] NMR (96008) x    1 [x] VASO  [] Manual (12000) x    [] Other:  [] TA x      [] Mech Traction (86670)  [] ES(attended) (95617)      [] ES (un) (34186):    ASSESSMENT:  No complaints with above routine with addition of some machines. Pt fatigues quickly with hip ex. Patient presents with signs and symptoms consistent with diagnosis of right knee stiffness/pain. Rehab potential is good at this time. Key impairments include: decreased ROM and strength of the right lower extremity, poor balance and compensatory gait patterning, increased swelling, and pain with functional activities such as squatting, walking, lunging, and stairs. Skilled PT is required to address these key impairments and to provide and progress with an appropriate home exercise program. The patient's complexity may change due to the nature of the patients presentation as well as the comorbidities and medical factors included in this patient's evaluation. GOALS:   Short Term Goals: To be achieved in: 2 weeks  1. Independent in HEP and progression per patient tolerance, in order to prevent re-injury. [] Progressing: [] Met: [] Not Met: [] Adjusted       2. Patient will have a decrease in pain to facilitate improvement in movement, function, and ADLs as indicated by Functional Deficits. [] Progressing: [] Met: [] Not Met: [] Adjusted          Long Term Goals: To be achieved in: 12 weeks  1. LEFS score will be below 20 to assist with reaching prior level of function. [] Progressing: [] Met: [] Not Met: [] Adjusted      2. Patient will demonstrate increased AROM to equal the opposite side bilaterally to allow for proper joint functioning as indicated by patients Functional Deficits. [] Progressing: [] Met: [] Not Met: [] Adjusted       3. Patient will demonstrate an increase in strength to 4+/5 to match bilaterally to allow for proper functional mobility as indicated by patients Functional Deficits. [] Progressing: [] Met: [] Not Met: [] Adjusted       4. Patient will return to all transfers, work activities, and functional activities without increased symptoms or restriction. [] Progressing: [] Met: [] Not Met: [] Adjusted       5. Patient will have 0/10 pain with ADL's.  [] Progressing: [] Met: [] Not Met: [] Adjusted       6. Patient stated goal: Return to a walking program each day with no limitations or assistive devices. [] Progressing: [] Met: [] Not Met: [] Adjusted      Overall Progression Towards Functional goals/ Treatment Progress Update:  [] Patient is progressing as expected towards functional goals listed. [] Progression is slowed due to complexities/Impairments listed. [] Progression has been slowed due to co-morbidities.   [x] Plan just implemented, too soon to assess goals progression <30days   [] Goals require adjustment due to lack of progress  [] Patient is not progressing as expected and requires additional follow up with physician  [] Other    Prognosis for POC: [x] Good [] Fair  [] Poor    Patient requires continued skilled intervention: [x] Yes  [] No    Treatment/Activity Tolerance:  [x] Patient able to complete treatment  [] Patient limited by fatigue  [] Patient limited by pain    [] Patient limited by other medical complications  [] Other:     Return to Play: (if applicable)   []  Stage 1: Intro to Strength   []  Stage 2: Return to Run and Strength   []  Stage 3: Return to Jump and Strength   []  Stage 4: Dynamic Strength and Agility   []  Stage 5: Sport Specific Training     []  Ready to Return to Play, Meets All Above Stages   []  Not Ready for Return to Sports   Comments:                         PLAN: See eval  [x] Continue per plan of care [] Alter current plan (see comments above)  [] Plan of care initiated [] Hold pending MD visit [] Discharge    Electronically signed by:  Carmen Rowley PTA, MHI, ATC    Note: If patient does not return for scheduled/ recommended follow up visits, this note will serve as a discharge from care along with most recent update on progress.

## 2023-02-22 ENCOUNTER — APPOINTMENT (OUTPATIENT)
Dept: PHYSICAL THERAPY | Age: 43
End: 2023-02-22
Payer: COMMERCIAL

## 2023-02-23 ENCOUNTER — APPOINTMENT (OUTPATIENT)
Dept: PHYSICAL THERAPY | Age: 43
End: 2023-02-23
Payer: COMMERCIAL

## 2023-02-28 ENCOUNTER — HOSPITAL ENCOUNTER (OUTPATIENT)
Dept: PHYSICAL THERAPY | Age: 43
Setting detail: THERAPIES SERIES
Discharge: HOME | End: 2023-02-28
Payer: COMMERCIAL

## 2023-02-28 NOTE — PROGRESS NOTES
723 [facility], [address] [place] [address], [address] [place] [address] [address]  Phone: [phone]   Fax:     [phone]    Physical Therapy  Cancellation/No-show Note  Patient Name:  Flakito Ford  :  1980   Date:  2023    Cancelled visits to date: 2  No-shows to date: 0    For today's appointment patient:  [x]  Cancelled  [x]  Rescheduled appointment  []  No-show     Reason given by patient:  [x]  Patient ill  []  Conflicting appointment  []  No transportation    []  Conflict with work  []  No reason given  []  Other:     Comments:      Phone call information:   []  Phone call made today to patient at am/pm at the number provided:      []  Patient answered, conversation as follows:    []  Patient did not answer, message left as follows:  [x]  Phone call not needed - pt contacted us to cancel and provided reason for cancellation.      Electronically signed by:  Guillermo Schultz PTA, MHI, ATC

## 2023-03-02 ENCOUNTER — APPOINTMENT (OUTPATIENT)
Dept: PHYSICAL THERAPY | Age: 43
End: 2023-03-02
Payer: COMMERCIAL

## 2023-03-07 ENCOUNTER — HOSPITAL ENCOUNTER (OUTPATIENT)
Dept: PHYSICAL THERAPY | Age: 43
Setting detail: THERAPIES SERIES
Discharge: HOME | End: 2023-03-07
Payer: COMMERCIAL

## 2023-03-07 NOTE — PROGRESS NOTES
723 [facility], [address] [place] [address], [address] [place] [address] [address]  Phone: [phone]   Fax:     [phone]    Physical Therapy  Cancellation/No-show Note  Patient Name:  Mario Avalos  :  1980   Date:  3/7/2023    Cancelled visits to date: 3  No-shows to date: 0    For today's appointment patient:  [x]  Cancelled  [x]  Rescheduled appointment  []  No-show     Reason given by patient:  [x]  Patient ill  []  Conflicting appointment  []  No transportation    []  Conflict with work  []  No reason given  []  Other:     Comments:      Phone call information:   []  Phone call made today to patient at am/pm at the number provided:      []  Patient answered, conversation as follows:    []  Patient did not answer, message left as follows:  [x]  Phone call not needed - pt contacted us to cancel and provided reason for cancellation.      Electronically signed by:  Gwenda Cockayne, PTA, MHI, ATC

## 2023-03-08 ENCOUNTER — HOSPITAL ENCOUNTER (OUTPATIENT)
Dept: PHYSICAL THERAPY | Age: 43
Setting detail: THERAPIES SERIES
Discharge: HOME | End: 2023-03-08
Payer: COMMERCIAL

## 2023-03-08 PROCEDURE — 97016 VASOPNEUMATIC DEVICE THERAPY: CPT | Performed by: SPECIALIST/TECHNOLOGIST

## 2023-03-08 PROCEDURE — 97110 THERAPEUTIC EXERCISES: CPT | Performed by: SPECIALIST/TECHNOLOGIST

## 2023-03-08 PROCEDURE — 97112 NEUROMUSCULAR REEDUCATION: CPT | Performed by: SPECIALIST/TECHNOLOGIST

## 2023-03-08 NOTE — PROGRESS NOTES
[facility], [place] [address] [place] [address]  Phone: [phone]   Fax:     [phone]    Physical Therapy Treatment Note/ Progress Report:     Date:  3/8/2023    Patient Name:  Bertin Ham    :  1980  MRN: [id number]  Restrictions/Precautions:    Medical/Treatment Diagnosis Information:  Other tear of medial meniscus of right knee as current injury, initial encounter [S83.241A]  Right knee PMM on 22  Insurance/Certification information:   [facility]  Physician Information:  George Solorio MD   Has the plan of care been signed (Y/N):        []  Yes  [x]  No     Date of Patient follow up with Physician: 23    Is this a Progress Report:     []  Yes  [x]  No      If Yes:  Date Range for reporting period:  Initial Eval: 2022  Beginnin2022 --- Endin23    Progress report will be due (10 Rx or 30 days whichever is less): 23     Recertification will be due (POC Duration  / 90 days whichever is less): 3/28/23      Visit # Insurance Allowable Auth Required   In Person 7 24 (until 3/17/23)  96 units []  Yes     []  No    Tele Health 0  []  Yes     []  No    Total 7       LEFS Score: 70%     Date assessed: 2022  Next Visit    Latex Allergy:  [x]NO      []YES  Preferred Language for Healthcare:   [x]English       []other:    Pain level:  8/10     SUBJECTIVE:   Pt notes she has been busy and hasn't had time to make the gym.   OBJECTIVE:   Observation:  0-115 knee ROM  Test measurements: See Eval    RESTRICTIONS/PRECAUTIONS:     Exercises/Interventions:   Therapeutic Ex (89230)  Therapeutic Activity (50575)  NMR re-education (72624) Sets/Reps Notes/CUES   Bike ROM 7 min         QS x20    Long sitting HSS 3x30\"    Long sitting gastroc 3x30\"    SLR 2x10    SSLR 2x10    Heel prop  Heel slide flex 1 min  x5          Calf stretch incline 30\"x3    HR 3x10    Leg press B 120# 3x10    Knee ext 10#  2x10 Recc    HS curl 40# 3x10     MICHELLE- ABD/EXT            TKE   45# 3x10ea  80# x 30    FWD/LAT stepups X20 ea    LSD x20 2\"   SLS 10\"x 5    Slr+ 10 second holds  5 pumps x2                       Manual Intervention (39508)     Patella mobs / flexion / quad stm 8 min                                                 Patient Education 5 min Provided biomechanics/ergonomics training to reduce stress across injured/healing structures. Therapeutic Exercise and NMR EXR  [x] (03219) Provided verbal/tactile cueing for activities related to strengthening, flexibility, endurance, ROM for improvements in LE, proximal hip, and core control with self care, mobility, lifting, ambulation. [x] (49065) Provided verbal/tactile cueing for activities related to improving balance, coordination, kinesthetic sense, posture, motor skill, proprioception to assist with LE, proximal hip, and core control in self-care, mobility, lifting, ambulation and eccentric single leg control. NMR and Therapeutic Activities:    [x] (34034 or 51960) Provided verbal/tactile cueing for activities related to improving balance, coordination, kinesthetic sense, posture, motor skill, proprioception and motor activation to allow for proper function of core, proximal hip and LE with self-care and ADLs and functional mobility.    [x] (49283) Gait Re-education- Provided training and instruction to the patient for proper LE, core and proximal hip recruitment and positioning and eccentric body weight control with ambulation re-education including up and down stairs     Home Exercise Program:    [x] (51849) Reviewed/Progressed HEP activities related to strengthening, flexibility, endurance, ROM of core, proximal hip and LE for functional self-care, mobility, lifting and ambulation/stair navigation   [x] (04006) Reviewed/Progressed HEP activities related to improving balance, coordination, kinesthetic sense, posture, motor skill, proprioception of core, proximal hip and LE for self-care, mobility, lifting, and ambulation/stair navigation      Manual Treatments:  PROM / STM / Oscillations-Mobs:  G-I, II, III, IV (PA's, Inf., Post.)  [x] (38835) Provided manual therapy to mobilize LE, proximal hip and/or LS spine soft tissue/joints for the purpose of modulating pain, promoting relaxation, increasing ROM, reducing/eliminating soft tissue swelling/inflammation/restriction, improving soft tissue extensibility and allowing for proper ROM for normal function with self-care, mobility, lifting and ambulation. Modalities:      Charges:  Timed Code Treatment Minutes: 38   Total Treatment Minutes:  48   BWC:  TE TIME:  NMR TIME:  MANUAL TIME:  UNTIMED MINUTES:  Medicare Total:                  [] EVAL (LOW) 56417 (typically 20 minutes face-to-face)  [] EVAL (MOD) 21283 (typically 30 minutes face-to-face)  [] EVAL (HIGH) 81804 (typically 45 minutes face-to-face)  [] RE-EVAL     [x] MM(53995) x    2 [] IONTO  [x] NMR (38422) x    1 [x] VASO  [] Manual (49663) x    [] Other:  [] TA x      [] Mech Traction (99773)  [] ES(attended) (87518)      [] ES (un) (14395):    ASSESSMENT:  No complaints with above routine with addition of some machines. Pt fatigues quickly with hip ex. Patient presents with signs and symptoms consistent with diagnosis of right knee stiffness/pain. Rehab potential is good at this time. Key impairments include: decreased ROM and strength of the right lower extremity, poor balance and compensatory gait patterning, increased swelling, and pain with functional activities such as squatting, walking, lunging, and stairs. Skilled PT is required to address these key impairments and to provide and progress with an appropriate home exercise program. The patient's complexity may change due to the nature of the patients presentation as well as the comorbidities and medical factors included in this patient's evaluation. GOALS:   Short Term Goals:  To be achieved in: 2 weeks  1. Independent in HEP and progression per patient tolerance, in order to prevent re-injury. [] Progressing: [] Met: [] Not Met: [] Adjusted       2. Patient will have a decrease in pain to facilitate improvement in movement, function, and ADLs as indicated by Functional Deficits. [] Progressing: [] Met: [] Not Met: [] Adjusted          Long Term Goals: To be achieved in: 12 weeks  1. LEFS score will be below 20 to assist with reaching prior level of function. [] Progressing: [] Met: [] Not Met: [] Adjusted      2. Patient will demonstrate increased AROM to equal the opposite side bilaterally to allow for proper joint functioning as indicated by patients Functional Deficits. [] Progressing: [] Met: [] Not Met: [] Adjusted       3. Patient will demonstrate an increase in strength to 4+/5 to match bilaterally to allow for proper functional mobility as indicated by patients Functional Deficits. [] Progressing: [] Met: [] Not Met: [] Adjusted       4. Patient will return to all transfers, work activities, and functional activities without increased symptoms or restriction. [] Progressing: [] Met: [] Not Met: [] Adjusted       5. Patient will have 0/10 pain with ADL's.  [] Progressing: [] Met: [] Not Met: [] Adjusted       6. Patient stated goal: Return to a walking program each day with no limitations or assistive devices. [] Progressing: [] Met: [] Not Met: [] Adjusted      Overall Progression Towards Functional goals/ Treatment Progress Update:  [] Patient is progressing as expected towards functional goals listed. [] Progression is slowed due to complexities/Impairments listed. [] Progression has been slowed due to co-morbidities.   [x] Plan just implemented, too soon to assess goals progression <30days   [] Goals require adjustment due to lack of progress  [] Patient is not progressing as expected and requires additional follow up with physician  [] Other    Prognosis for POC: [x] Good [] Fair  [] Poor    Patient requires continued skilled intervention: [x] Yes  [] No    Treatment/Activity Tolerance:  [x] Patient able to complete treatment  [] Patient limited by fatigue  [] Patient limited by pain    [] Patient limited by other medical complications  [] Other:     Return to Play: (if applicable)   []  Stage 1: Intro to Strength   []  Stage 2: Return to Run and Strength   []  Stage 3: Return to Jump and Strength   []  Stage 4: Dynamic Strength and Agility   []  Stage 5: Sport Specific Training     []  Ready to Return to Play, Meets All Above Stages   []  Not Ready for Return to Sports   Comments:                         PLAN: See eval  [x] Continue per plan of care [] Alter current plan (see comments above)  [] Plan of care initiated [] Hold pending MD visit [] Discharge    Electronically signed by:  Carmen Rowley PTA, MHI, ATC    Note: If patient does not return for scheduled/ recommended follow up visits, this note will serve as a discharge from care along with most recent update on progress.

## 2023-03-15 ENCOUNTER — HOSPITAL ENCOUNTER (OUTPATIENT)
Dept: PHYSICAL THERAPY | Age: 43
Setting detail: THERAPIES SERIES
Discharge: HOME | End: 2023-03-15
Payer: COMMERCIAL

## 2023-03-15 NOTE — PROGRESS NOTES
033 [facility], [address] [place] [address], [address] [place] [address] [address]  Phone: [phone]   Fax:     [phone]    Physical Therapy  Cancellation/No-show Note  Patient Name:  Lew Schaumann  :  1980   Date:  3/15/2023    Cancelled visits to date: 3  No-shows to date: 0    For today's appointment patient:  [x]  Cancelled  [x]  Rescheduled appointment  []  No-show     Reason given by patient:  []  Patient ill  []  Conflicting appointment  []  No transportation    []  Conflict with work  [x]  No reason given  []  Other:     Comments:      Phone call information:   [x]  Phone call made today to patient at 545 am/pm at the number provided:      []  Patient answered, conversation as follows:    []  Patient did not answer, message left as follows:LMTC  []  Phone call not needed - pt contacted us to cancel and provided reason for cancellation.      Electronically signed by:  Andrea Wu, PTA, MHI, ATC

## 2023-03-22 ENCOUNTER — HOSPITAL ENCOUNTER (OUTPATIENT)
Dept: PHYSICAL THERAPY | Age: 43
Setting detail: THERAPIES SERIES
Discharge: HOME | End: 2023-03-22
Payer: COMMERCIAL

## 2023-03-22 NOTE — PROGRESS NOTES
933 [facility], [address] [place] [address], [address] [place] [address] [address]  Phone: [phone]   Fax:     [phone]    Physical Therapy  Cancellation/No-show Note  Patient Name:  Leesa Davis  :  1980   Date:  3/22/2023    Cancelled visits to date: 4  No-shows to date: 0    For today's appointment patient:  [x]  Cancelled  [x]  Rescheduled appointment  []  No-show     Reason given by patient:  []  Patient ill  []  Conflicting appointment  [x]  No transportation    []  Conflict with work  []  No reason given  []  Other:     Comments:      Phone call information:   []  Phone call made today to patient at  am/pm at the number provided:      []  Patient answered, conversation as follows:    []  Patient did not answer, message left as follows:LMTC  [x]  Phone call not needed - pt contacted us to cancel and provided reason for cancellation.      Electronically signed by:  Marlene Peters, PTA, MHI, ATC

## 2023-03-29 ENCOUNTER — HOSPITAL ENCOUNTER (OUTPATIENT)
Dept: PHYSICAL THERAPY | Age: 43
Setting detail: THERAPIES SERIES
Discharge: HOME | End: 2023-03-29
Payer: COMMERCIAL

## 2023-03-29 NOTE — PROGRESS NOTES
723 [facility], [address] [place] [address], [address] [place] [address] [address]  Phone: [phone]   Fax:     [phone]    Physical Therapy  Cancellation/No-show Note  Patient Name:  Dejuan Zaidi  :  1980   Date:  3/29/2023    Cancelled visits to date: 5  No-shows to date: 0    For today's appointment patient:  []  Cancelled  []  Rescheduled appointment  [x]  No-show     Reason given by patient:  []  Patient ill  []  Conflicting appointment  []  No transportation    []  Conflict with work  [x]  No reason given  []  Other:     Comments:      Phone call information:   [x]  Phone call made today to patient at  535 am/pm at the number provided:      []  Patient answered, conversation as follows:    []  Patient did not answer, message left as follows:LMTC  []  Phone call not needed - pt contacted us to cancel and provided reason for cancellation.      Electronically signed by:  Rigo Valleoj PTA, MHI, ATC

## 2023-04-19 ENCOUNTER — OFFICE VISIT (OUTPATIENT)
Dept: ORTHOPEDIC SURGERY | Age: 43
End: 2023-04-19

## 2023-04-19 VITALS — WEIGHT: 250 LBS | RESPIRATION RATE: 16 BRPM | HEIGHT: 68 IN | BODY MASS INDEX: 37.89 KG/M2

## 2023-04-19 DIAGNOSIS — M17.12 PRIMARY OSTEOARTHRITIS OF LEFT KNEE: ICD-10-CM

## 2023-04-19 DIAGNOSIS — M25.562 CHRONIC PAIN OF LEFT KNEE: Primary | ICD-10-CM

## 2023-04-19 DIAGNOSIS — M76.52 PATELLAR TENDONITIS OF LEFT KNEE: ICD-10-CM

## 2023-04-19 DIAGNOSIS — G89.29 CHRONIC PAIN OF LEFT KNEE: Primary | ICD-10-CM

## 2023-04-19 RX ORDER — BUPIVACAINE HYDROCHLORIDE 2.5 MG/ML
4 INJECTION, SOLUTION EPIDURAL; INFILTRATION; INTRACAUDAL ONCE
Status: COMPLETED | OUTPATIENT
Start: 2023-04-19 | End: 2023-04-19

## 2023-04-19 RX ORDER — DICLOFENAC SODIUM 75 MG/1
75 TABLET, DELAYED RELEASE ORAL 2 TIMES DAILY
Qty: 60 TABLET | Refills: 3 | Status: SHIPPED | OUTPATIENT
Start: 2023-04-19

## 2023-04-19 RX ORDER — LIDOCAINE HYDROCHLORIDE 10 MG/ML
4 INJECTION, SOLUTION INFILTRATION; PERINEURAL ONCE
Status: COMPLETED | OUTPATIENT
Start: 2023-04-19 | End: 2023-04-19

## 2023-04-19 RX ORDER — TRIAMCINOLONE ACETONIDE 40 MG/ML
40 INJECTION, SUSPENSION INTRA-ARTICULAR; INTRAMUSCULAR ONCE
Status: COMPLETED | OUTPATIENT
Start: 2023-04-19 | End: 2023-04-19

## 2023-04-19 RX ADMIN — TRIAMCINOLONE ACETONIDE 40 MG: 40 INJECTION, SUSPENSION INTRA-ARTICULAR; INTRAMUSCULAR at 10:06

## 2023-04-19 RX ADMIN — LIDOCAINE HYDROCHLORIDE 4 ML: 10 INJECTION, SOLUTION INFILTRATION; PERINEURAL at 10:06

## 2023-04-19 RX ADMIN — BUPIVACAINE HYDROCHLORIDE 10 MG: 2.5 INJECTION, SOLUTION EPIDURAL; INFILTRATION; INTRACAUDAL at 10:05

## 2023-04-19 NOTE — PROGRESS NOTES
Problem Relation Age of Onset    Diabetes Mother     Depression Mother     High Blood Pressure Mother     High Cholesterol Mother     High Blood Pressure Father     No Known Problems Brother        Social History     Socioeconomic History    Marital status: Single   Tobacco Use    Smoking status: Never    Smokeless tobacco: Never   Vaping Use    Vaping Use: Never used   Substance and Sexual Activity    Alcohol use: Yes    Drug use: No    Sexual activity: Yes     Partners: Male     Birth control/protection: I.U.D. Social Determinants of Health     Financial Resource Strain: Low Risk     Difficulty of Paying Living Expenses: Not hard at all   Food Insecurity: No Food Insecurity    Worried About [address] in the Last Year: Never true    [address] in the Last Year: Never true   Transportation Needs: No Transportation Needs    Lack of Transportation (Medical): No    Lack of Transportation (Non-Medical): No   Housing Stability: Low Risk     Unable to Pay for Housing in the Last Year: No    Number of [place] in the Last Year: 1    Unstable Housing in the Last Year: No       Current Outpatient Medications   Medication Sig Dispense Refill    buPROPion (WELLBUTRIN XL) 150 MG extended release tablet Take 1 tablet by mouth every morning 90 tablet 1    hydroCHLOROthiazide (HYDRODIURIL) 25 MG tablet TAKE ONE TABLET BY MOUTH EVERY MORNING 90 tablet 1    FIBER ADULT GUMMIES PO Take 2 capsules by mouth daily      Fexofenadine HCl (ALLEGRA PO) Take by mouth      ibuprofen (ADVIL;MOTRIN) 200 MG tablet Take 1 tablet by mouth every 6 hours as needed for Pain      Vitamin D (CHOLECALCIFEROL) 25 MCG (1000 UT) TABS tablet Take 1 tablet by mouth daily       No current facility-administered medications for this visit.        No Known Allergies    Review of Systems:  I have reviewed the clinically relevant past medical history, medications, allergies, family history, social history, and 13 point Review of Systems

## 2023-04-25 ENCOUNTER — HOSPITAL ENCOUNTER (OUTPATIENT)
Dept: PHYSICAL THERAPY | Age: 43
Setting detail: THERAPIES SERIES
Discharge: HOME | End: 2023-04-25
Payer: COMMERCIAL

## 2023-04-25 PROCEDURE — 97110 THERAPEUTIC EXERCISES: CPT

## 2023-04-25 PROCEDURE — 97016 VASOPNEUMATIC DEVICE THERAPY: CPT

## 2023-04-25 PROCEDURE — 97112 NEUROMUSCULAR REEDUCATION: CPT

## 2023-04-25 PROCEDURE — 97161 PT EVAL LOW COMPLEX 20 MIN: CPT

## 2023-04-25 NOTE — PLAN OF CARE
[facility], [facility], [address] [facility] [address]  Phone: [phone]   Fax:     [phone]       Physical Therapy Certification    Dear Araceli Ortega ,    We had the pleasure of evaluating the following patient for physical therapy services at [address] [place] [address]. A summary of our findings can be found in the initial assessment below. This includes our plan of care. If you have any questions or concerns regarding these findings, please do not hesitate to contact me at the office phone number checked above. Thank you for the referral.       Physician Signature:_______________________________Date:__________________  By signing above (or electronic signature), therapists plan is approved by physician      Patient: Catrachito Booker   : 1980   MRN: [id number]  Referring Physician:        Evaluation Date: 2023      Medical Diagnosis Information:  Diagnosis: Primary osteoarthritis of left knee M17.12   Treatment Diagnosis: Left knee pain M25. 562                                         Insurance information: PT Insurance Information: [employer]     Precautions/ Contra-indications: HTN, anxiety/depression    C-SSRS Triggered by Intake questionnaire (Past 2 wk assessment):   [x] No, Questionnaire did not trigger screening.   [] Yes, Patient intake triggered further evaluation      [] C-SSRS Screening completed  [] PCP notified via Plan of Care  [] Emergency services notified     Latex Allergy:  [x]NO      []YES  Preferred Language for Healthcare:   [x]English       []other:    SUBJECTIVE: Patient stated complaint: Pt had sx PMM and PT for R knee. Came to a few PT visits then did not return or complete therapy at that time. Stated L knee gradual increase in symptoms over past 3 years.  XR neg for fracture or acute injury, pos for moderated medial

## 2023-04-25 NOTE — FLOWSHEET NOTE
[address] [place] [address] and Sports Rehabilitation[address], [address] [place] [address] [address]  Phone: [phone]   Fax:     [phone]      Physical Therapy Treatment Note/ Progress Report:     Date:  2023    Patient Name:  Opal Spear    :  1980  MRN: [id number]  Restrictions/Precautions:    Medical/Treatment Diagnosis Information:  Diagnosis: Primary osteoarthritis of left knee M17.12  Treatment Diagnosis: Left knee pain M25. 459  Insurance/Certification information:  PT Insurance Information: [facility]  Physician Information:   Gerhardt Garret, PA  Has the plan of care been signed (Y/N):        []  Yes  [x]  No     Date of Patient follow up with Physician: after PT    Is this a Progress Report:     []  Yes  [x]  No      If Yes:  Date Range for reporting period:  Beginnin23 ------------ Endin23    Progress report will be due (10 Rx or 30 days whichever is less):      Recertification will be due (POC Duration  / 90 days whichever is less): 23      Visit # Insurance Allowable Auth Required   In Person 1 12 V (until 23) []  Yes     []  No    Tele Health 0  []  Yes     []  No    Total 1       FOTO Score: LEFS 65%     Date assessed:  23      Latex Allergy:  [x]NO      []YES  Preferred Language for Healthcare:   [x]English       []other:    Pain level:  0-4/10     SUBJECTIVE:  See eval    OBJECTIVE: See eval  Observation:   Test measurements:      RESTRICTIONS/PRECAUTIONS:  HTN, anxiety/depression    Exercises/Interventions:   Therapeutic Ex (43029) Sets/sec Reps Notes/CUES HEP   Calf stretch incline 30s 3     HR       Leg press 100# 3x10     Knee ext L ecc 15# 2x10     HS curl 45# 2x10     MICHELLE- ABD/EXT 45# 2x10ea     TKE 60# 2x10L                                 Pt education 10 min  Reviewed HEP, goals of PT, not to push through pain with ex or activity- pt stated understanding    Manual Intervention ([phone])

## 2023-05-02 ENCOUNTER — HOSPITAL ENCOUNTER (OUTPATIENT)
Dept: PHYSICAL THERAPY | Age: 43
Setting detail: THERAPIES SERIES
End: 2023-05-02
Payer: COMMERCIAL

## 2023-05-04 ENCOUNTER — HOSPITAL ENCOUNTER (OUTPATIENT)
Dept: PHYSICAL THERAPY | Age: 43
Setting detail: THERAPIES SERIES
Discharge: HOME | End: 2023-05-04
Payer: COMMERCIAL

## 2023-05-04 PROCEDURE — 97110 THERAPEUTIC EXERCISES: CPT

## 2023-05-04 PROCEDURE — 97112 NEUROMUSCULAR REEDUCATION: CPT

## 2023-05-04 PROCEDURE — 97016 VASOPNEUMATIC DEVICE THERAPY: CPT

## 2023-05-04 NOTE — FLOWSHEET NOTE
and ambulation/stair navigation      Manual Treatments:  PROM / STM / Oscillations-Mobs:  G-I, II, III, IV (PA's, Inf., Post.)  [] (92391) Provided manual therapy to mobilize LE, proximal hip and/or LS spine soft tissue/joints for the purpose of modulating pain, promoting relaxation, increasing ROM, reducing/eliminating soft tissue swelling/inflammation/restriction, improving soft tissue extensibility and allowing for proper ROM for normal function with self-care, mobility, lifting and ambulation. Modalities:     [x] GAME READY (VASO)- for significant edema, swelling, pain control. Vasopneumatic compression applied to L knee for significant edema, swelling, pain control. ICD-10 code: R60.9 Edema unspecified. GIRTH LEFT RIGHT POST VASO   MID PATELLA 44.5 cm 44 cm 44 cm   5 CM ABOVE      15 CM BELOW      Figure 8 Ankle           Charges:  Timed Code Treatment Minutes: 38   Total Treatment Minutes:  48   BWC:  TE TIME:  NMR TIME:  MANUAL TIME:  UNTIMED MINUTES:  Medicare Total:                 [] EVAL (LOW) 36595 (typically 20 minutes face-to-face)  [] EVAL (MOD) 48392 (typically 30 minutes face-to-face)  [] EVAL (HIGH) 64902 (typically 45 minutes face-to-face)  [] RE-EVAL     [x] GZ(62560) x   2  [] IONTO  [x] NMR (12544) x     [x] VASO  [] Manual (55675) x     [] Other:  [] TA x      [] Mech Traction (98988)  [] ES(attended) (69850)      [] ES (un) (14503):    ASSESSMENT:  Held hip ext due to report of increased back pain initially. Pt able to perform routine without issues. GOALS:     Patient stated goal: Less pain walking/steps. Therapist goals for Patient:   Short Term Goals: To be achieved in: 2 weeks  1. Independent in HEP and progression per patient tolerance, in order to prevent re-injury. [] Progressing: [] Met: [] Not Met: [] Adjusted     2. Patient will have a decrease in pain to facilitate improvement in movement, function, and ADLs as indicated by Functional Deficits.   [] Progressing:

## 2023-05-09 ENCOUNTER — HOSPITAL ENCOUNTER (OUTPATIENT)
Dept: PHYSICAL THERAPY | Age: 43
Setting detail: THERAPIES SERIES
Discharge: HOME | End: 2023-05-09
Payer: COMMERCIAL

## 2023-05-09 NOTE — FLOWSHEET NOTE
[address] [facility] and [facility], [address] [place] [address], [address] [place] [address] [address]  Phone: [phone]   Fax:     [phone]    Physical Therapy  Cancellation/No-show Note  Patient Name:  Naresh Kitchen  :  1980   Date:  2023    Cancelled visits to date: 10  No-shows to date: 0    For today's appointment patient:  [x]  Cancelled  [x]  Rescheduled appointment  []  No-show     Reason given by patient:  []  Patient ill  []  Conflicting appointment  []  No transportation    [x]  Conflict with work  []  No reason given  []  Other:     Comments:      Phone call information:   []  Phone call made today to patient at   am/pm at the number provided:      []  Patient answered, conversation as follows:    []  Patient did not answer, message left as follows:  [x]  Phone call not needed - pt contacted us to cancel and provided reason for cancellation.      Electronically signed by:  Severiano Poag, PTA, MHI, ATC

## 2023-05-18 ENCOUNTER — HOSPITAL ENCOUNTER (OUTPATIENT)
Dept: PHYSICAL THERAPY | Age: 43
Setting detail: THERAPIES SERIES
Discharge: HOME | End: 2023-05-18
Payer: COMMERCIAL

## 2023-05-18 NOTE — FLOWSHEET NOTE
[address] [facility] and [facility], [address] [place] [address], [address] [place] [address] [address]  Phone: [phone]   Fax:     [phone]    Physical Therapy  Cancellation/No-show Note  Patient Name:  Owen Bledsoe  :  1980   Date:  2023    Cancelled visits to date: 10  No-shows to date: 1    For today's appointment patient:  []  Cancelled  []  Rescheduled appointment  [x]  No-show     Reason given by patient:  []  Patient ill  []  Conflicting appointment  []  No transportation    []  Conflict with work  [x]  No reason given  []  Other:     Comments:      Phone call information:   [x]  Phone call made today to patient at   am/pm at the number provided:      []  Patient answered, conversation as follows:    [x]  Patient did not answer, message left as follows: missed appt call to reschedule. []  Phone call not needed - pt contacted us to cancel and provided reason for cancellation.      Electronically signed by:  Dallas Evans, PT, DPT

## 2023-10-05 NOTE — TELEPHONE ENCOUNTER
L/M FOR Bertin Ham  regarding MRI RIGHT KNEE approval and authorization being valid until 9/9/2022. Patient was instructed that their MRI needs to be scheduled at [facility]. The patient was instructed to contact the facility to schedule  at [phone]. A follow up appointment will need to be scheduled to review the results and treatment plan.
none

## 2023-10-11 ENCOUNTER — TELEPHONE (OUTPATIENT)
Dept: FAMILY MEDICINE CLINIC | Age: 43
End: 2023-10-11

## 2023-10-11 NOTE — TELEPHONE ENCOUNTER
----- Message from Hussein Maki sent at 10/11/2023  9:29 AM EDT -----  Subject: Appointment Request    Reason for Call: Established Patient Appointment needed: Semi-Routine   Cough, Cold Symptoms    QUESTIONS    Reason for appointment request? No appointments available during search     Additional Information for Provider? Patient believes she has a sinus   infection and would like to be seen today if possible. She is going on a   trip and would like to be seen and get put on a antibiotic   ---------------------------------------------------------------------------  --------------  [address]  [phone];  Do not leave any message, patient will call back for answer  ---------------------------------------------------------------------------  --------------  SCRIPT ANSWERS

## 2024-02-26 ENCOUNTER — TELEPHONE (OUTPATIENT)
Dept: FAMILY MEDICINE CLINIC | Age: 44
End: 2024-02-26

## 2024-02-26 ENCOUNTER — OFFICE VISIT (OUTPATIENT)
Dept: FAMILY MEDICINE CLINIC | Age: 44
End: 2024-02-26

## 2024-02-26 VITALS
WEIGHT: 246.6 LBS | OXYGEN SATURATION: 97 % | HEIGHT: 68 IN | RESPIRATION RATE: 18 BRPM | BODY MASS INDEX: 37.38 KG/M2 | DIASTOLIC BLOOD PRESSURE: 80 MMHG | HEART RATE: 87 BPM | SYSTOLIC BLOOD PRESSURE: 120 MMHG

## 2024-02-26 DIAGNOSIS — Z00.00 ENCOUNTER FOR WELL ADULT EXAM WITHOUT ABNORMAL FINDINGS: Primary | ICD-10-CM

## 2024-02-26 DIAGNOSIS — R53.83 FATIGUE, UNSPECIFIED TYPE: ICD-10-CM

## 2024-02-26 DIAGNOSIS — D64.9 ANEMIA, UNSPECIFIED TYPE: ICD-10-CM

## 2024-02-26 DIAGNOSIS — Z71.89 ACP (ADVANCE CARE PLANNING): ICD-10-CM

## 2024-02-26 DIAGNOSIS — E55.9 VITAMIN D DEFICIENCY: ICD-10-CM

## 2024-02-26 DIAGNOSIS — R73.01 IFG (IMPAIRED FASTING GLUCOSE): ICD-10-CM

## 2024-02-26 DIAGNOSIS — I10 ESSENTIAL HYPERTENSION: ICD-10-CM

## 2024-02-26 DIAGNOSIS — E78.2 MIXED HYPERLIPIDEMIA: ICD-10-CM

## 2024-02-26 DIAGNOSIS — N64.4 BREAST PAIN IN FEMALE: Primary | ICD-10-CM

## 2024-02-26 DIAGNOSIS — Z12.11 COLON CANCER SCREENING: ICD-10-CM

## 2024-02-26 PROCEDURE — 99396 PREV VISIT EST AGE 40-64: CPT | Performed by: NURSE PRACTITIONER

## 2024-02-26 PROCEDURE — 3079F DIAST BP 80-89 MM HG: CPT | Performed by: NURSE PRACTITIONER

## 2024-02-26 PROCEDURE — 3074F SYST BP LT 130 MM HG: CPT | Performed by: NURSE PRACTITIONER

## 2024-02-26 RX ORDER — FLAXSEED OIL 1000 MG
1 CAPSULE ORAL DAILY
COMMUNITY

## 2024-02-26 RX ORDER — DIPHENHYDRAMINE HYDROCHLORIDE 25 MG/1
2 TABLET ORAL DAILY
COMMUNITY

## 2024-02-26 SDOH — ECONOMIC STABILITY: FOOD INSECURITY: WITHIN THE PAST 12 MONTHS, YOU WORRIED THAT YOUR FOOD WOULD RUN OUT BEFORE YOU GOT MONEY TO BUY MORE.: OFTEN TRUE

## 2024-02-26 SDOH — ECONOMIC STABILITY: FOOD INSECURITY: WITHIN THE PAST 12 MONTHS, THE FOOD YOU BOUGHT JUST DIDN'T LAST AND YOU DIDN'T HAVE MONEY TO GET MORE.: SOMETIMES TRUE

## 2024-02-26 SDOH — ECONOMIC STABILITY: INCOME INSECURITY: HOW HARD IS IT FOR YOU TO PAY FOR THE VERY BASICS LIKE FOOD, HOUSING, MEDICAL CARE, AND HEATING?: VERY HARD

## 2024-02-26 ASSESSMENT — PATIENT HEALTH QUESTIONNAIRE - PHQ9
10. IF YOU CHECKED OFF ANY PROBLEMS, HOW DIFFICULT HAVE THESE PROBLEMS MADE IT FOR YOU TO DO YOUR WORK, TAKE CARE OF THINGS AT HOME, OR GET ALONG WITH OTHER PEOPLE: 1
SUM OF ALL RESPONSES TO PHQ9 QUESTIONS 1 & 2: 3
4. FEELING TIRED OR HAVING LITTLE ENERGY: 1
SUM OF ALL RESPONSES TO PHQ QUESTIONS 1-9: 10
6. FEELING BAD ABOUT YOURSELF - OR THAT YOU ARE A FAILURE OR HAVE LET YOURSELF OR YOUR FAMILY DOWN: 1
SUM OF ALL RESPONSES TO PHQ QUESTIONS 1-9: 10
SUM OF ALL RESPONSES TO PHQ QUESTIONS 1-9: 10
2. FEELING DOWN, DEPRESSED OR HOPELESS: 1
1. LITTLE INTEREST OR PLEASURE IN DOING THINGS: 2
7. TROUBLE CONCENTRATING ON THINGS, SUCH AS READING THE NEWSPAPER OR WATCHING TELEVISION: 1
8. MOVING OR SPEAKING SO SLOWLY THAT OTHER PEOPLE COULD HAVE NOTICED. OR THE OPPOSITE, BEING SO FIGETY OR RESTLESS THAT YOU HAVE BEEN MOVING AROUND A LOT MORE THAN USUAL: 2
3. TROUBLE FALLING OR STAYING ASLEEP: 1
5. POOR APPETITE OR OVEREATING: 1
9. THOUGHTS THAT YOU WOULD BE BETTER OFF DEAD, OR OF HURTING YOURSELF: 0
SUM OF ALL RESPONSES TO PHQ QUESTIONS 1-9: 10

## 2024-02-26 NOTE — PROGRESS NOTES
Well Adult Note  Name: Bertin Ham Today’s Date: 2024   MRN: [id number] Sex: Female   Age: 43 y.o. Ethnicity: Non- / Non    : 1980 Race: White (non-)      Bertin Ham is here for well adult exam.  History:     noted hair coming out in clumps when hair was blond. . Bertin has noted thinning. Changed to brunette. Added biotin and super collagen 2023. No improvement. Changed shampoo to tisha hair thickening.     Depression screen is positive. However has wellbutrin but can't remember to take it.     Weight down 13 pounds over the past 14 months. She doesn't know how this occurred. Denies nausea, diarrhea, decreased appetite.     Review of Systems   All other systems reviewed and are negative.      No Known Allergies      Prior to Visit Medications    Medication Sig Taking? Authorizing Provider   Biotin (BIOTIN 5000) 5 MG CAPS Take 2 capsules by mouth daily Yes Thompson Ramos MD   Collagen-Vitamin C (SUPER COLLAGEN PLUS VITAMIN C) 1000-10 MG TABS Take 1 tablet by mouth daily Yes Thompson Ramos MD   FIBER ADULT GUMMIES PO Take 2 capsules by mouth daily Yes Thompson Ramos MD   ibuprofen (ADVIL;MOTRIN) 200 MG tablet Take 1 tablet by mouth every 6 hours as needed for Pain  Patient not taking: Reported on 2024  Thompson Ramos MD         Past Medical History:   Diagnosis Date    Anxiety 10/18/2019    Chondromalacia of right patella     Depression     Essential hypertension 2020    Gastroesophageal reflux disease without esophagitis 2017    Irritable bowel syndrome without diarrhea 2017       Past Surgical History:   Procedure Laterality Date    CHOLECYSTECTOMY      KNEE ARTHROSCOPY Right 2022    RIGHT KNEE VIDEO ARTHROSCOPY, PARTIAL MEDIAL MENISCECTOMY VERSUS, by Dr Solorio    KNEE ARTHROSCOPY Right 2022    RIGHT KNEE VIDEO ARTHROSCOPY, PARTIAL MEDIAL MENISCECTOMY performed by George Solorio MD at

## 2024-02-26 NOTE — PATIENT INSTRUCTIONS
Advance Care Planning     Advance Care Planning opens a door to talk about and write down your wishes before a sudden accident or illness.  Make your goals, values, and preferences known.     This puts you in the ’s seat and helps others know what matters most to you so they won’t have to guess.      Where can you learn more?    Go to [email or web address]/patient-resources/advance-[email or web address]   to learn how to:    Name someone you trust to make healthcare decisions for you, only if you can’t. (Healthcare Power of )    Document your wishes for care if you were seriously ill and not expected to recover or are approaching end of life. (Advance Directive or Living Will)    The same page can be found using the QR code below.                Starting a Weight Loss Plan: Care Instructions  Overview     If you're thinking about losing weight, it can be hard to know where to start. Your doctor can help you set up a weight loss plan that best meets your needs. You may want to take a class on nutrition or exercise, or you could join a weight loss support group. If you have questions about how to make changes to your eating or exercise habits, ask your doctor about seeing a registered dietitian or an exercise specialist.  It can be a big challenge to lose weight. But you don't have to make huge changes at once. Make small changes, and stick with them. When those changes become habit, add a few more changes.  If you don't think you're ready to make changes right now, try to pick a date in the future. Make an appointment to see your doctor to discuss whether the time is right for you to start a plan.  Follow-up care is a key part of your treatment and safety. Be sure to make and go to all appointments, and call your doctor if you are having problems. It's also a good idea to know your test results and keep a list of the medicines you take.  How can you care for yourself at home?  Set realistic goals. Many people

## 2024-02-26 NOTE — TELEPHONE ENCOUNTER
Received phone call from Makayla with [facility] she stated that the pt stopped over to sched her mammogram however the pt stated that she is having breast pain- bilateral and they are asking for a mammorgam DX bilateral to be placed. And a bilateral US pt not scheduled until end of march

## 2024-02-27 LAB
25(OH)D3 SERPL-MCNC: 36.8 NG/ML
ALBUMIN SERPL-MCNC: 4.3 G/DL (ref 3.4–5)
ALBUMIN/GLOB SERPL: 1.3 {RATIO} (ref 1.1–2.2)
ALP SERPL-CCNC: 119 U/L (ref 40–129)
ALT SERPL-CCNC: 15 U/L (ref 10–40)
ANION GAP SERPL CALCULATED.3IONS-SCNC: 12 MMOL/L (ref 3–16)
AST SERPL-CCNC: 16 U/L (ref 15–37)
BASOPHILS # BLD: 0.1 K/UL (ref 0–0.2)
BASOPHILS NFR BLD: 1 %
BILIRUB SERPL-MCNC: 0.3 MG/DL (ref 0–1)
BUN SERPL-MCNC: 7 MG/DL (ref 7–20)
CALCIUM SERPL-MCNC: 9.3 MG/DL (ref 8.3–10.6)
CHLORIDE SERPL-SCNC: 104 MMOL/L (ref 99–110)
CHOLEST SERPL-MCNC: 273 MG/DL (ref 0–199)
CO2 SERPL-SCNC: 24 MMOL/L (ref 21–32)
CREAT SERPL-MCNC: 0.7 MG/DL (ref 0.6–1.1)
DEPRECATED RDW RBC AUTO: 13.6 % (ref 12.4–15.4)
EOSINOPHIL # BLD: 0.2 K/UL (ref 0–0.6)
EOSINOPHIL NFR BLD: 2 %
EST. AVERAGE GLUCOSE BLD GHB EST-MCNC: 108.3 MG/DL
FERRITIN SERPL IA-MCNC: 313.1 NG/ML (ref 15–150)
GFR SERPLBLD CREATININE-BSD FMLA CKD-EPI: >60 ML/MIN/{1.73_M2}
GLUCOSE SERPL-MCNC: 87 MG/DL (ref 70–99)
HBA1C MFR BLD: 5.4 %
HCT VFR BLD AUTO: 41.4 % (ref 36–48)
HDLC SERPL-MCNC: 39 MG/DL (ref 40–60)
HGB BLD-MCNC: 13.8 G/DL (ref 12–16)
IRON SATN MFR SERPL: 35 % (ref 15–50)
IRON SERPL-MCNC: 105 UG/DL (ref 37–145)
LDLC SERPL CALC-MCNC: 215 MG/DL
LYMPHOCYTES # BLD: 3.1 K/UL (ref 1–5.1)
LYMPHOCYTES NFR BLD: 32.5 %
MCH RBC QN AUTO: 28.7 PG (ref 26–34)
MCHC RBC AUTO-ENTMCNC: 33.4 G/DL (ref 31–36)
MCV RBC AUTO: 86 FL (ref 80–100)
MONOCYTES # BLD: 0.6 K/UL (ref 0–1.3)
MONOCYTES NFR BLD: 6.8 %
NEUTROPHILS # BLD: 5.4 K/UL (ref 1.7–7.7)
NEUTROPHILS NFR BLD: 57.7 %
PLATELET # BLD AUTO: 416 K/UL (ref 135–450)
PMV BLD AUTO: 8.8 FL (ref 5–10.5)
POTASSIUM SERPL-SCNC: 4.3 MMOL/L (ref 3.5–5.1)
PROT SERPL-MCNC: 7.6 G/DL (ref 6.4–8.2)
RBC # BLD AUTO: 4.82 M/UL (ref 4–5.2)
SODIUM SERPL-SCNC: 140 MMOL/L (ref 136–145)
T4 FREE SERPL-MCNC: 1.4 NG/DL (ref 0.9–1.8)
TIBC SERPL-MCNC: 296 UG/DL (ref 260–445)
TRIGL SERPL-MCNC: 96 MG/DL (ref 0–150)
TSH SERPL DL<=0.005 MIU/L-ACNC: 0.92 UIU/ML (ref 0.27–4.2)
VIT B12 SERPL-MCNC: 479 PG/ML (ref 211–911)
VLDLC SERPL CALC-MCNC: 19 MG/DL
WBC # BLD AUTO: 9.4 K/UL (ref 4–11)

## 2024-02-29 DIAGNOSIS — R79.89 ELEVATED FERRITIN: ICD-10-CM

## 2024-02-29 DIAGNOSIS — R79.89 ELEVATED FERRITIN: Primary | ICD-10-CM

## 2024-02-29 LAB — TRANSFERRIN SERPL-MCNC: 234 MG/DL (ref 200–360)

## 2024-03-05 LAB
HFE GENE MUT ANL BLD/T: NORMAL
HFE P.C282Y BLD/T QL: NORMAL
HFE P.H63D BLD/T QL: NEGATIVE
HFE P.S65C BLD/T QL: NEGATIVE
SPECIMEN SOURCE: NORMAL

## 2024-03-22 ENCOUNTER — TELEPHONE (OUTPATIENT)
Dept: FAMILY MEDICINE CLINIC | Age: 44
End: 2024-03-22

## 2024-03-22 ENCOUNTER — HOSPITAL ENCOUNTER (OUTPATIENT)
Dept: WOMENS IMAGING | Age: 44
Discharge: HOME | End: 2024-03-22

## 2024-03-22 ENCOUNTER — HOSPITAL ENCOUNTER (OUTPATIENT)
Dept: WOMENS IMAGING | Age: 44
End: 2024-03-22

## 2024-03-22 VITALS — BODY MASS INDEX: 39.4 KG/M2 | HEIGHT: 68 IN | WEIGHT: 260 LBS

## 2024-03-22 DIAGNOSIS — R79.89 ELEVATED FERRITIN: ICD-10-CM

## 2024-03-22 DIAGNOSIS — N64.4 BREAST PAIN IN FEMALE: ICD-10-CM

## 2024-03-22 DIAGNOSIS — Z12.31 ENCOUNTER FOR SCREENING MAMMOGRAM FOR MALIGNANT NEOPLASM OF BREAST: ICD-10-CM

## 2024-03-22 DIAGNOSIS — E78.01 FAMILIAL HYPERCHOLESTEROLEMIA: ICD-10-CM

## 2024-03-22 DIAGNOSIS — E78.2 MIXED HYPERLIPIDEMIA: ICD-10-CM

## 2024-03-22 DIAGNOSIS — F41.9 ANXIETY: ICD-10-CM

## 2024-03-22 DIAGNOSIS — F33.0 MILD EPISODE OF RECURRENT MAJOR DEPRESSIVE DISORDER (HCC): Primary | ICD-10-CM

## 2024-03-22 PROCEDURE — 77063 BREAST TOMOSYNTHESIS BI: CPT

## 2024-03-22 RX ORDER — ROSUVASTATIN CALCIUM 20 MG/1
20 TABLET, COATED ORAL NIGHTLY
Qty: 90 TABLET | Refills: 1 | Status: SHIPPED | OUTPATIENT
Start: 2024-03-22

## 2024-03-22 RX ORDER — ESCITALOPRAM OXALATE 10 MG/1
10 TABLET ORAL DAILY
Qty: 90 TABLET | Refills: 3 | Status: SHIPPED | OUTPATIENT
Start: 2024-03-22

## 2024-03-22 NOTE — TELEPHONE ENCOUNTER
Patient was supposed to get refill for Lexapro sent to her pharmacy from February 29th visit but still has not been sent. Also would like to discuss recent lab results. Please call her.

## 2024-03-25 ENCOUNTER — TELEPHONE (OUTPATIENT)
Dept: FAMILY MEDICINE CLINIC | Age: 44
End: 2024-03-25

## 2024-03-25 DIAGNOSIS — R92.8 ABNORMAL MAMMOGRAM: Primary | ICD-10-CM

## 2024-03-25 DIAGNOSIS — R60.0 LOCALIZED EDEMA: Primary | ICD-10-CM

## 2024-03-25 RX ORDER — HYDROCHLOROTHIAZIDE 25 MG/1
25 TABLET ORAL EVERY MORNING
Qty: 15 TABLET | Refills: 0 | Status: SHIPPED | OUTPATIENT
Start: 2024-03-25

## 2024-03-25 NOTE — TELEPHONE ENCOUNTER
Patient states she is going on vacation in June and she states every time she goes in salt water she always gets very swollen and its painful. She wants to know if there is something she can take to help with the swelling while gone?  Salt water

## 2024-03-26 NOTE — TELEPHONE ENCOUNTER
The patient has been notified of this information and all questions answered.  Patient states that she called [facility], was told she will have to make a payment upfront, but she isn't able to afford this.   Will need external referrral faxed once completed to  and [facility].     - P) [phone]   F) [phone]    [facility]  P) [phone]   F) [phone]

## 2024-03-27 DIAGNOSIS — R79.89 ELEVATED FERRITIN: Primary | ICD-10-CM

## 2024-03-27 NOTE — TELEPHONE ENCOUNTER
I placed a referral to Dr. Vásquez with [facility]. Please forward the referral and let Bertin know the referral is placed.

## 2024-04-05 ENCOUNTER — HOSPITAL ENCOUNTER (OUTPATIENT)
Dept: WOMENS IMAGING | Age: 44
Discharge: HOME | End: 2024-04-05
Payer: MEDICAID

## 2024-04-05 DIAGNOSIS — R92.8 ABNORMAL MAMMOGRAM: ICD-10-CM

## 2024-04-05 PROCEDURE — G0279 TOMOSYNTHESIS, MAMMO: HCPCS

## 2024-04-05 PROCEDURE — 76642 ULTRASOUND BREAST LIMITED: CPT

## 2024-04-29 PROBLEM — R79.89 ELEVATED FERRITIN: Status: RESOLVED | Noted: 2024-02-29 | Resolved: 2024-04-29

## 2024-04-29 PROBLEM — E83.110 HEREDITARY HEMOCHROMATOSIS (HCC): Status: ACTIVE | Noted: 2024-04-23

## 2024-07-23 ENCOUNTER — OFFICE VISIT (OUTPATIENT)
Dept: FAMILY MEDICINE CLINIC | Age: 44
End: 2024-07-23
Payer: COMMERCIAL

## 2024-07-23 VITALS
BODY MASS INDEX: 38.74 KG/M2 | SYSTOLIC BLOOD PRESSURE: 100 MMHG | DIASTOLIC BLOOD PRESSURE: 70 MMHG | HEART RATE: 89 BPM | WEIGHT: 254.8 LBS | OXYGEN SATURATION: 98 % | RESPIRATION RATE: 18 BRPM | TEMPERATURE: 98.5 F

## 2024-07-23 DIAGNOSIS — E78.2 MIXED HYPERLIPIDEMIA: ICD-10-CM

## 2024-07-23 DIAGNOSIS — R06.83 SNORING: Primary | ICD-10-CM

## 2024-07-23 DIAGNOSIS — R06.2 WHEEZING: ICD-10-CM

## 2024-07-23 DIAGNOSIS — Z12.11 COLON CANCER SCREENING: ICD-10-CM

## 2024-07-23 DIAGNOSIS — J40 BRONCHITIS: ICD-10-CM

## 2024-07-23 DIAGNOSIS — R06.81 APNEA: ICD-10-CM

## 2024-07-23 DIAGNOSIS — R05.1 ACUTE COUGH: ICD-10-CM

## 2024-07-23 LAB
ALBUMIN SERPL-MCNC: 4.2 G/DL (ref 3.4–5)
ALP SERPL-CCNC: 124 U/L (ref 40–129)
ALT SERPL-CCNC: 17 U/L (ref 10–40)
AST SERPL-CCNC: 14 U/L (ref 15–37)
BILIRUB DIRECT SERPL-MCNC: <0.2 MG/DL (ref 0–0.3)
BILIRUB INDIRECT SERPL-MCNC: ABNORMAL MG/DL (ref 0–1)
BILIRUB SERPL-MCNC: 0.4 MG/DL (ref 0–1)
CHOLEST SERPL-MCNC: 249 MG/DL (ref 0–199)
HDLC SERPL-MCNC: 43 MG/DL (ref 40–60)
LDLC SERPL CALC-MCNC: 178 MG/DL
PROT SERPL-MCNC: 7.5 G/DL (ref 6.4–8.2)
TRIGL SERPL-MCNC: 138 MG/DL (ref 0–150)
VLDLC SERPL CALC-MCNC: 28 MG/DL

## 2024-07-23 PROCEDURE — 1036F TOBACCO NON-USER: CPT | Performed by: NURSE PRACTITIONER

## 2024-07-23 PROCEDURE — G8427 DOCREV CUR MEDS BY ELIG CLIN: HCPCS | Performed by: NURSE PRACTITIONER

## 2024-07-23 PROCEDURE — G8417 CALC BMI ABV UP PARAM F/U: HCPCS | Performed by: NURSE PRACTITIONER

## 2024-07-23 PROCEDURE — 99214 OFFICE O/P EST MOD 30 MIN: CPT | Performed by: NURSE PRACTITIONER

## 2024-07-23 PROCEDURE — 3074F SYST BP LT 130 MM HG: CPT | Performed by: NURSE PRACTITIONER

## 2024-07-23 PROCEDURE — 3078F DIAST BP <80 MM HG: CPT | Performed by: NURSE PRACTITIONER

## 2024-07-23 RX ORDER — ROSUVASTATIN CALCIUM 20 MG/1
20 TABLET, COATED ORAL NIGHTLY
Qty: 90 TABLET | Refills: 1 | Status: SHIPPED | OUTPATIENT
Start: 2024-07-23 | End: 2024-07-25 | Stop reason: SDUPTHER

## 2024-07-23 RX ORDER — ALBUTEROL SULFATE 90 UG/1
2 AEROSOL, METERED RESPIRATORY (INHALATION) EVERY 6 HOURS PRN
Qty: 18 G | Refills: 3 | Status: SHIPPED | OUTPATIENT
Start: 2024-07-23

## 2024-07-23 RX ORDER — BENZONATATE 100 MG/1
100 CAPSULE ORAL 3 TIMES DAILY PRN
Qty: 30 CAPSULE | Refills: 0 | Status: SHIPPED | OUTPATIENT
Start: 2024-07-23

## 2024-07-23 RX ORDER — PREDNISONE 10 MG/1
TABLET ORAL
Qty: 35 TABLET | Refills: 0 | Status: SHIPPED | OUTPATIENT
Start: 2024-07-23

## 2024-07-23 NOTE — PROGRESS NOTES
Bertin Ham (:  1980) is a 43 y.o. female,Established patient, here for evaluation of the following chief complaint(s):  Depression, Cough, Congestion, Referral - General (For sleep apnea ), and Hyperlipidemia      Assessment & Plan   1. Snoring  -     Tra Eugene MD, Sleep Medicine, [facility]  2. Apnea  -     Tra Eugene MD, Sleep Medicine, [facility]  3. Mixed hyperlipidemia  -     rosuvastatin (CRESTOR) 20 MG tablet; Take 1 tablet by mouth nightly, Disp-90 tablet, R-1Normal  -     Hepatic Function Panel; Future  -     Lipid Panel; Future  4. Wheezing  -     predniSONE (DELTASONE) 10 MG tablet; 2 tablets 2 times daily for 5 days, 1 tablet 2 times daily for 5 days, 1 tablet daily, Disp-35 tablet, R-0Normal  -     benzonatate (TESSALON PERLES) 100 MG capsule; Take 1 capsule by mouth 3 times daily as needed for Cough, Disp-30 capsule, R-0Normal  -     albuterol sulfate HFA (PROVENTIL;VENTOLIN;PROAIR) 108 (90 Base) MCG/ACT inhaler; Inhale 2 puffs into the lungs every 6 hours as needed for Wheezing, Disp-18 g, R-3Normal  5. Bronchitis  -     predniSONE (DELTASONE) 10 MG tablet; 2 tablets 2 times daily for 5 days, 1 tablet 2 times daily for 5 days, 1 tablet daily, Disp-35 tablet, R-0Normal  -     benzonatate (TESSALON PERLES) 100 MG capsule; Take 1 capsule by mouth 3 times daily as needed for Cough, Disp-30 capsule, R-0Normal  -     albuterol sulfate HFA (PROVENTIL;VENTOLIN;PROAIR) 108 (90 Base) MCG/ACT inhaler; Inhale 2 puffs into the lungs every 6 hours as needed for Wheezing, Disp-18 g, R-3Normal  6. Acute cough  -     benzonatate (TESSALON PERLES) 100 MG capsule; Take 1 capsule by mouth 3 times daily as needed for Cough, Disp-30 capsule, R-0Normal  7. Colon cancer screening  -     [facility] - Evonne Neal MD, Gastroenterology, [facility]    Albuterol every 6 hours until cough lessens. Then a minimum of 2 times daily for 2 weeks    Add mucinex DM for cough    Feels

## 2024-07-25 DIAGNOSIS — E78.2 MIXED HYPERLIPIDEMIA: ICD-10-CM

## 2024-07-25 DIAGNOSIS — E78.01 FAMILIAL HYPERCHOLESTEROLEMIA: Primary | ICD-10-CM

## 2024-07-25 RX ORDER — ROSUVASTATIN CALCIUM 40 MG/1
40 TABLET, COATED ORAL NIGHTLY
Qty: 90 TABLET | Refills: 1 | Status: SHIPPED | OUTPATIENT
Start: 2024-07-25

## 2024-10-02 ENCOUNTER — OFFICE VISIT (OUTPATIENT)
Dept: SLEEP MEDICINE | Age: 44
End: 2024-10-02
Payer: COMMERCIAL

## 2024-10-02 ENCOUNTER — TELEPHONE (OUTPATIENT)
Dept: SLEEP MEDICINE | Age: 44
End: 2024-10-02

## 2024-10-02 VITALS
HEIGHT: 68 IN | BODY MASS INDEX: 39.86 KG/M2 | HEART RATE: 78 BPM | OXYGEN SATURATION: 98 % | WEIGHT: 263 LBS | SYSTOLIC BLOOD PRESSURE: 130 MMHG | DIASTOLIC BLOOD PRESSURE: 110 MMHG

## 2024-10-02 DIAGNOSIS — G25.81 RLS (RESTLESS LEGS SYNDROME): ICD-10-CM

## 2024-10-02 DIAGNOSIS — G47.10 HYPERSOMNIA: ICD-10-CM

## 2024-10-02 DIAGNOSIS — R53.83 OTHER FATIGUE: ICD-10-CM

## 2024-10-02 DIAGNOSIS — R51.9 MORNING HEADACHE: ICD-10-CM

## 2024-10-02 DIAGNOSIS — R03.0 ELEVATED BLOOD PRESSURE READING: ICD-10-CM

## 2024-10-02 DIAGNOSIS — G47.30 OBSERVED SLEEP APNEA: ICD-10-CM

## 2024-10-02 DIAGNOSIS — R06.83 SNORING: Primary | ICD-10-CM

## 2024-10-02 DIAGNOSIS — E66.9 OBESITY (BMI 30-39.9): ICD-10-CM

## 2024-10-02 PROCEDURE — 3080F DIAST BP >= 90 MM HG: CPT | Performed by: NURSE PRACTITIONER

## 2024-10-02 PROCEDURE — 3075F SYST BP GE 130 - 139MM HG: CPT | Performed by: NURSE PRACTITIONER

## 2024-10-02 PROCEDURE — G8417 CALC BMI ABV UP PARAM F/U: HCPCS | Performed by: NURSE PRACTITIONER

## 2024-10-02 PROCEDURE — G8427 DOCREV CUR MEDS BY ELIG CLIN: HCPCS | Performed by: NURSE PRACTITIONER

## 2024-10-02 PROCEDURE — 99244 OFF/OP CNSLTJ NEW/EST MOD 40: CPT | Performed by: NURSE PRACTITIONER

## 2024-10-02 PROCEDURE — G8484 FLU IMMUNIZE NO ADMIN: HCPCS | Performed by: NURSE PRACTITIONER

## 2024-10-02 ASSESSMENT — SLEEP AND FATIGUE QUESTIONNAIRES
ESS TOTAL SCORE: 20
HOW LIKELY ARE YOU TO NOD OFF OR FALL ASLEEP WHEN YOU ARE A PASSENGER IN A CAR FOR AN HOUR WITHOUT A BREAK: HIGH CHANCE OF DOZING
HOW LIKELY ARE YOU TO NOD OFF OR FALL ASLEEP WHILE WATCHING TV: HIGH CHANCE OF DOZING
HOW LIKELY ARE YOU TO NOD OFF OR FALL ASLEEP WHILE SITTING AND READING: HIGH CHANCE OF DOZING
HOW LIKELY ARE YOU TO NOD OFF OR FALL ASLEEP IN A CAR, WHILE STOPPED FOR A FEW MINUTES IN TRAFFIC: MODERATE CHANCE OF DOZING
HOW LIKELY ARE YOU TO NOD OFF OR FALL ASLEEP WHILE SITTING QUIETLY AFTER LUNCH WITHOUT ALCOHOL: HIGH CHANCE OF DOZING
HOW LIKELY ARE YOU TO NOD OFF OR FALL ASLEEP WHILE SITTING INACTIVE IN A PUBLIC PLACE: HIGH CHANCE OF DOZING
HOW LIKELY ARE YOU TO NOD OFF OR FALL ASLEEP WHILE LYING DOWN TO REST IN THE AFTERNOON WHEN CIRCUMSTANCES PERMIT: HIGH CHANCE OF DOZING
HOW LIKELY ARE YOU TO NOD OFF OR FALL ASLEEP WHILE SITTING AND TALKING TO SOMEONE: WOULD NEVER DOZE

## 2024-10-02 NOTE — PROGRESS NOTES
Patient ID: Bertin Ham is a 43 y.o. female who is being seen today for   Chief Complaint   Patient presents with    New Patient     Day time fatigue  Wake up gasping     Referring: KRISSY Elena-TONG    HPI:   Bertin Ham is a 43 y.o. female in office for apnea, snoring evaluation. Patient reports snoring at night for the past 10+ years. Worse in supine position. Has witnessed apnea. Wakes up at night choking and gasping for air. No restorative sleep. +dry mouth upon awakening. Fatigue and tiredness during the day. No history of sleep apnea. Bedtime 9-11 pm and rise time is 630 am. It takes few minutes to fall asleep. No TV in bedroom. 3 nocturia. Wakes up 3-4 times at night. It takes few minutes to fall back a sleep. Takes occasional nap during the day- does when able. +headache in am. No car wrecks or near wrecks because of the sleepiness. Can get sleepy while driving at time. No weight gain. Gained 100 pounds in the past 3-4 years. Some forgetfulness and decreased concentration.  Drinks 2-4 caffinated beverages per day. No significant alcohol. +restless feelings in legs at night. No loss of muscle strength when angry or laugh. No hallucination when dozing off or waking up from sleep. No paralysis upon awakening from sleep or going to sleep. +teeth grinding. No nightmares. No sleep walking. No night time panic attacks. No narcotics. Denies other drug abuse. +history of depression and history of anxiety- working with PCP. No history of atrial fibrillation. No history of DM. No history of HTN. No history of ischemic heart disease. No history of stroke. ESS is 20 . No smoking. No FH for JESSICA, or narcolepsy. +FH for RLS- dad    Blood pressure is elevated in office today.  Denies CP, vision change or Headache.           10/2/2024     2:12 PM   Sleep Medicine   Sitting and reading 3   Watching TV 3   Sitting, inactive in a public place (e.g. a theatre or a meeting) 3   As a passenger in a car for an

## 2024-10-02 NOTE — PATIENT INSTRUCTIONS
your worries to bed: Leave worries about job, school, daily life, etc., behind when you go to bed. Some people find it useful to assign a \"worry period\" during the evening or afternoon for these issues.

## 2024-10-02 NOTE — TELEPHONE ENCOUNTER
Routed HST order to sleep center. Patient provided with number for Sleep center and DME list in the office. Scheduled 31-90 follow-up.

## 2024-10-03 ENCOUNTER — TELEPHONE (OUTPATIENT)
Dept: SLEEP CENTER | Age: 44
End: 2024-10-03

## 2024-10-07 ENCOUNTER — HOSPITAL ENCOUNTER (OUTPATIENT)
Dept: WOMENS IMAGING | Age: 44
Discharge: HOME | End: 2024-10-07
Payer: COMMERCIAL

## 2024-10-07 ENCOUNTER — HOSPITAL ENCOUNTER (OUTPATIENT)
Dept: WOMENS IMAGING | Age: 44
End: 2024-10-07
Payer: COMMERCIAL

## 2024-10-07 VITALS — WEIGHT: 263 LBS | BODY MASS INDEX: 38.95 KG/M2 | HEIGHT: 69 IN

## 2024-10-07 DIAGNOSIS — R92.8 MAMMOGRAM ABNORMAL: ICD-10-CM

## 2024-10-07 DIAGNOSIS — R92.8 ABNORMAL MAMMOGRAM: ICD-10-CM

## 2024-10-07 PROCEDURE — 76642 ULTRASOUND BREAST LIMITED: CPT

## 2024-10-07 PROCEDURE — G0279 TOMOSYNTHESIS, MAMMO: HCPCS

## 2024-10-08 ENCOUNTER — TELEPHONE (OUTPATIENT)
Dept: FAMILY MEDICINE CLINIC | Age: 44
End: 2024-10-08

## 2024-10-08 NOTE — TELEPHONE ENCOUNTER
ENTRY FOR [facility] MAMMOGRAM RAFFLE    Completion of mammogram before Oct 31st equals 1 entry. Completion same day as order/visit with EFM will equal 2 entries.    Mammogram Completion date: 3/22/2024      RAFFLE TIX #: [id number]      [facility] Raffle will be held on Friday Nov 1st.  4 winners will be selected. (One from each office POD)  If chosen office staff will contact patient to coordinate raffle basket collection.

## 2024-10-27 ENCOUNTER — HOSPITAL ENCOUNTER (OUTPATIENT)
Dept: SLEEP CENTER | Age: 44
Discharge: HOME | End: 2024-10-29
Payer: COMMERCIAL

## 2024-10-27 DIAGNOSIS — G47.10 HYPERSOMNIA: ICD-10-CM

## 2024-10-27 DIAGNOSIS — G47.30 OBSERVED SLEEP APNEA: ICD-10-CM

## 2024-10-27 DIAGNOSIS — R06.83 SNORING: ICD-10-CM

## 2024-10-27 DIAGNOSIS — E66.9 OBESITY (BMI 30-39.9): ICD-10-CM

## 2024-10-27 DIAGNOSIS — R53.83 OTHER FATIGUE: ICD-10-CM

## 2024-10-27 PROCEDURE — 95806 SLEEP STUDY UNATT&RESP EFFT: CPT

## 2024-10-30 PROBLEM — R06.83 SNORING: Status: ACTIVE | Noted: 2024-10-30

## 2024-10-31 ENCOUNTER — TELEPHONE (OUTPATIENT)
Dept: PULMONOLOGY | Age: 44
End: 2024-10-31

## 2024-10-31 DIAGNOSIS — G47.33 SEVERE OBSTRUCTIVE SLEEP APNEA: Primary | ICD-10-CM

## 2024-10-31 NOTE — TELEPHONE ENCOUNTER
HST showed severe JESSICA.  Recommendation to trial auto CPAP 8-16 cm H2O.  Please set up as soon as possible due to severity of sleep apnea.  Orders placed in epic.  Please make patient aware and fax orders to DME of her choice.     Please make DME aware patient needs set up for soon as possible

## 2024-11-06 NOTE — TELEPHONE ENCOUNTER
31-90 needs R/S due to aida being out of office that day. LVM for patient to call the office. Follow appt note

## 2025-01-16 ENCOUNTER — TELEPHONE (OUTPATIENT)
Dept: SLEEP MEDICINE | Age: 45
End: 2025-01-16

## 2025-01-16 ENCOUNTER — OFFICE VISIT (OUTPATIENT)
Dept: SLEEP MEDICINE | Age: 45
End: 2025-01-16
Payer: COMMERCIAL

## 2025-01-16 VITALS
OXYGEN SATURATION: 98 % | WEIGHT: 276.6 LBS | HEART RATE: 71 BPM | RESPIRATION RATE: 16 BRPM | BODY MASS INDEX: 40.97 KG/M2 | HEIGHT: 69 IN | SYSTOLIC BLOOD PRESSURE: 116 MMHG | DIASTOLIC BLOOD PRESSURE: 84 MMHG

## 2025-01-16 DIAGNOSIS — G47.34 NOCTURNAL HYPOXIA: ICD-10-CM

## 2025-01-16 DIAGNOSIS — G47.33 SEVERE OBSTRUCTIVE SLEEP APNEA: Primary | ICD-10-CM

## 2025-01-16 DIAGNOSIS — Z71.89 CPAP USE COUNSELING: ICD-10-CM

## 2025-01-16 DIAGNOSIS — E66.01 OBESITY, MORBID, BMI 40.0-49.9: ICD-10-CM

## 2025-01-16 DIAGNOSIS — I10 ESSENTIAL HYPERTENSION: ICD-10-CM

## 2025-01-16 PROCEDURE — 99214 OFFICE O/P EST MOD 30 MIN: CPT | Performed by: NURSE PRACTITIONER

## 2025-01-16 PROCEDURE — G8417 CALC BMI ABV UP PARAM F/U: HCPCS | Performed by: NURSE PRACTITIONER

## 2025-01-16 PROCEDURE — G8427 DOCREV CUR MEDS BY ELIG CLIN: HCPCS | Performed by: NURSE PRACTITIONER

## 2025-01-16 PROCEDURE — 3074F SYST BP LT 130 MM HG: CPT | Performed by: NURSE PRACTITIONER

## 2025-01-16 PROCEDURE — 1036F TOBACCO NON-USER: CPT | Performed by: NURSE PRACTITIONER

## 2025-01-16 PROCEDURE — 3079F DIAST BP 80-89 MM HG: CPT | Performed by: NURSE PRACTITIONER

## 2025-01-16 ASSESSMENT — SLEEP AND FATIGUE QUESTIONNAIRES
HOW LIKELY ARE YOU TO NOD OFF OR FALL ASLEEP WHILE SITTING AND TALKING TO SOMEONE: WOULD NEVER DOZE
HOW LIKELY ARE YOU TO NOD OFF OR FALL ASLEEP WHILE LYING DOWN TO REST IN THE AFTERNOON WHEN CIRCUMSTANCES PERMIT: SLIGHT CHANCE OF DOZING
HOW LIKELY ARE YOU TO NOD OFF OR FALL ASLEEP WHILE SITTING QUIETLY AFTER LUNCH WITHOUT ALCOHOL: WOULD NEVER DOZE
ESS TOTAL SCORE: 1
HOW LIKELY ARE YOU TO NOD OFF OR FALL ASLEEP WHILE WATCHING TV: WOULD NEVER DOZE
HOW LIKELY ARE YOU TO NOD OFF OR FALL ASLEEP WHILE SITTING AND READING: WOULD NEVER DOZE
HOW LIKELY ARE YOU TO NOD OFF OR FALL ASLEEP IN A CAR, WHILE STOPPED FOR A FEW MINUTES IN TRAFFIC: WOULD NEVER DOZE
HOW LIKELY ARE YOU TO NOD OFF OR FALL ASLEEP WHILE SITTING INACTIVE IN A PUBLIC PLACE: WOULD NEVER DOZE
HOW LIKELY ARE YOU TO NOD OFF OR FALL ASLEEP WHEN YOU ARE A PASSENGER IN A CAR FOR AN HOUR WITHOUT A BREAK: WOULD NEVER DOZE

## 2025-01-16 NOTE — PROGRESS NOTES
Bilateral 02/19/2020    LAPAROSCOPIC BILATERAL OVARIAN CYSTECTOMY performed by [place]alini [place]le [place] [facility] [place]    PELVIC FLOOR REPAIR  05/03/2021    POST COLPORRHAPHY,RECTUM/VAGINA sling, enterocele repair, cystoscopy-[facility] EXCISION TUMOR SOFT TISSUE BACK/FLANK SUBQ <3CM N/A 07/16/2019    WIDE LOCAL EXCISION BACK LESION performed by Fernando Elizondo MD at [facility] OR       Allergies:  has No Known Allergies.  Social History:    TOBACCO:   reports that she has never smoked. She has never used smokeless tobacco.  ETOH:   reports current alcohol use.    Family History:       Problem Relation Age of Onset    Diabetes Mother     Depression Mother     High Blood Pressure Mother     High Cholesterol Mother     High Blood Pressure Father     No Known Problems Brother        Current Medications:    Current Outpatient Medications:     rosuvastatin (CRESTOR) 40 MG tablet, Take 1 tablet by mouth nightly, Disp: 90 tablet, Rfl: 1    escitalopram (LEXAPRO) 10 MG tablet, Take 1 tablet by mouth daily, Disp: 90 tablet, Rfl: 3    Biotin (BIOTIN 5000) 5 MG CAPS, Take 2 capsules by mouth daily, Disp: , Rfl:     albuterol sulfate HFA (PROVENTIL;VENTOLIN;PROAIR) 108 (90 Base) MCG/ACT inhaler, Inhale 2 puffs into the lungs every 6 hours as needed for Wheezing (Patient not taking: Reported on 1/16/2025), Disp: 18 g, Rfl: 3    ibuprofen (ADVIL;MOTRIN) 200 MG tablet, Take 1 tablet by mouth every 6 hours as needed for Pain (Patient not taking: Reported on 1/16/2025), Disp: , Rfl:       Review of Systems      Objective:   PHYSICAL EXAM:  /84 (Site: Right Upper Arm, Position: Sitting, Cuff Size: Large Adult)   Pulse 71   Resp 16   Ht 1.753 m (5' 9\")   Wt 125.5 kg (276 lb 9.6 oz)   SpO2 98%   BMI 40.85 kg/m²     Physical Exam  Gen: No acute distress. Obese. BMI of 40.85  Eyes: PERRL. No sclera icterus. No conjunctival injection.   ENT: No discharge.   Neck: Trachea midline. No obvious mass. Neck circumference 18\"  Resp:

## 2025-01-16 NOTE — TELEPHONE ENCOUNTER
Pt was seen today by SYLWIA 1/16/25. She ordered a ONPO. This order has been sent to [facility] Please watch for results.

## 2025-01-23 ENCOUNTER — OFFICE VISIT (OUTPATIENT)
Dept: FAMILY MEDICINE CLINIC | Age: 45
End: 2025-01-23
Payer: COMMERCIAL

## 2025-01-23 VITALS
DIASTOLIC BLOOD PRESSURE: 74 MMHG | SYSTOLIC BLOOD PRESSURE: 110 MMHG | HEART RATE: 73 BPM | BODY MASS INDEX: 40.08 KG/M2 | RESPIRATION RATE: 18 BRPM | OXYGEN SATURATION: 97 % | WEIGHT: 271.4 LBS

## 2025-01-23 DIAGNOSIS — E78.01 FAMILIAL HYPERCHOLESTEROLEMIA: ICD-10-CM

## 2025-01-23 DIAGNOSIS — R73.01 IFG (IMPAIRED FASTING GLUCOSE): ICD-10-CM

## 2025-01-23 DIAGNOSIS — E66.01 CLASS 3 SEVERE OBESITY DUE TO EXCESS CALORIES WITH SERIOUS COMORBIDITY AND BODY MASS INDEX (BMI) OF 40.0 TO 44.9 IN ADULT: ICD-10-CM

## 2025-01-23 DIAGNOSIS — E66.813 CLASS 3 SEVERE OBESITY DUE TO EXCESS CALORIES WITH SERIOUS COMORBIDITY AND BODY MASS INDEX (BMI) OF 40.0 TO 44.9 IN ADULT: ICD-10-CM

## 2025-01-23 DIAGNOSIS — I10 ESSENTIAL HYPERTENSION: ICD-10-CM

## 2025-01-23 DIAGNOSIS — E78.2 MIXED HYPERLIPIDEMIA: ICD-10-CM

## 2025-01-23 DIAGNOSIS — F41.9 ANXIETY: ICD-10-CM

## 2025-01-23 DIAGNOSIS — F33.0 MILD EPISODE OF RECURRENT MAJOR DEPRESSIVE DISORDER (HCC): ICD-10-CM

## 2025-01-23 DIAGNOSIS — I10 ESSENTIAL HYPERTENSION: Primary | ICD-10-CM

## 2025-01-23 LAB
ALBUMIN SERPL-MCNC: 4.4 G/DL (ref 3.4–5)
ALBUMIN/GLOB SERPL: 1.4 {RATIO} (ref 1.1–2.2)
ALP SERPL-CCNC: 125 U/L (ref 40–129)
ALT SERPL-CCNC: 17 U/L (ref 10–40)
ANION GAP SERPL CALCULATED.3IONS-SCNC: 11 MMOL/L (ref 3–16)
AST SERPL-CCNC: 18 U/L (ref 15–37)
BILIRUB SERPL-MCNC: 0.3 MG/DL (ref 0–1)
BUN SERPL-MCNC: 8 MG/DL (ref 7–20)
CALCIUM SERPL-MCNC: 9.2 MG/DL (ref 8.3–10.6)
CHLORIDE SERPL-SCNC: 104 MMOL/L (ref 99–110)
CHOLEST SERPL-MCNC: 273 MG/DL (ref 0–199)
CO2 SERPL-SCNC: 26 MMOL/L (ref 21–32)
CREAT SERPL-MCNC: 0.8 MG/DL (ref 0.6–1.1)
EST. AVERAGE GLUCOSE BLD GHB EST-MCNC: 119.8 MG/DL
GFR SERPLBLD CREATININE-BSD FMLA CKD-EPI: >90 ML/MIN/{1.73_M2}
GLUCOSE SERPL-MCNC: 89 MG/DL (ref 70–99)
HBA1C MFR BLD: 5.8 %
HDLC SERPL-MCNC: 42 MG/DL (ref 40–60)
LDLC SERPL CALC-MCNC: 200 MG/DL
POTASSIUM SERPL-SCNC: 4.3 MMOL/L (ref 3.5–5.1)
PROT SERPL-MCNC: 7.6 G/DL (ref 6.4–8.2)
SODIUM SERPL-SCNC: 141 MMOL/L (ref 136–145)
TRIGL SERPL-MCNC: 156 MG/DL (ref 0–150)
VLDLC SERPL CALC-MCNC: 31 MG/DL

## 2025-01-23 PROCEDURE — 3074F SYST BP LT 130 MM HG: CPT | Performed by: NURSE PRACTITIONER

## 2025-01-23 PROCEDURE — G8417 CALC BMI ABV UP PARAM F/U: HCPCS | Performed by: NURSE PRACTITIONER

## 2025-01-23 PROCEDURE — 99214 OFFICE O/P EST MOD 30 MIN: CPT | Performed by: NURSE PRACTITIONER

## 2025-01-23 PROCEDURE — 3078F DIAST BP <80 MM HG: CPT | Performed by: NURSE PRACTITIONER

## 2025-01-23 PROCEDURE — 1036F TOBACCO NON-USER: CPT | Performed by: NURSE PRACTITIONER

## 2025-01-23 PROCEDURE — G8427 DOCREV CUR MEDS BY ELIG CLIN: HCPCS | Performed by: NURSE PRACTITIONER

## 2025-01-23 RX ORDER — ESCITALOPRAM OXALATE 10 MG/1
10 TABLET ORAL DAILY
Qty: 90 TABLET | Refills: 3 | Status: SHIPPED | OUTPATIENT
Start: 2025-01-23

## 2025-01-23 RX ORDER — ROSUVASTATIN CALCIUM 40 MG/1
40 TABLET, COATED ORAL NIGHTLY
Qty: 90 TABLET | Refills: 3 | Status: SHIPPED | OUTPATIENT
Start: 2025-01-23

## 2025-01-23 SDOH — ECONOMIC STABILITY: FOOD INSECURITY: WITHIN THE PAST 12 MONTHS, YOU WORRIED THAT YOUR FOOD WOULD RUN OUT BEFORE YOU GOT MONEY TO BUY MORE.: NEVER TRUE

## 2025-01-23 SDOH — ECONOMIC STABILITY: FOOD INSECURITY: WITHIN THE PAST 12 MONTHS, THE FOOD YOU BOUGHT JUST DIDN'T LAST AND YOU DIDN'T HAVE MONEY TO GET MORE.: NEVER TRUE

## 2025-01-23 ASSESSMENT — PATIENT HEALTH QUESTIONNAIRE - PHQ9
7. TROUBLE CONCENTRATING ON THINGS, SUCH AS READING THE NEWSPAPER OR WATCHING TELEVISION: NOT AT ALL
9. THOUGHTS THAT YOU WOULD BE BETTER OFF DEAD, OR OF HURTING YOURSELF: NOT AT ALL
SUM OF ALL RESPONSES TO PHQ QUESTIONS 1-9: 3
3. TROUBLE FALLING OR STAYING ASLEEP: NOT AT ALL
5. POOR APPETITE OR OVEREATING: SEVERAL DAYS
SUM OF ALL RESPONSES TO PHQ QUESTIONS 1-9: 3
10. IF YOU CHECKED OFF ANY PROBLEMS, HOW DIFFICULT HAVE THESE PROBLEMS MADE IT FOR YOU TO DO YOUR WORK, TAKE CARE OF THINGS AT HOME, OR GET ALONG WITH OTHER PEOPLE: NOT DIFFICULT AT ALL
4. FEELING TIRED OR HAVING LITTLE ENERGY: NOT AT ALL
1. LITTLE INTEREST OR PLEASURE IN DOING THINGS: NOT AT ALL
2. FEELING DOWN, DEPRESSED OR HOPELESS: SEVERAL DAYS
8. MOVING OR SPEAKING SO SLOWLY THAT OTHER PEOPLE COULD HAVE NOTICED. OR THE OPPOSITE, BEING SO FIGETY OR RESTLESS THAT YOU HAVE BEEN MOVING AROUND A LOT MORE THAN USUAL: NOT AT ALL
6. FEELING BAD ABOUT YOURSELF - OR THAT YOU ARE A FAILURE OR HAVE LET YOURSELF OR YOUR FAMILY DOWN: SEVERAL DAYS
SUM OF ALL RESPONSES TO PHQ QUESTIONS 1-9: 3
SUM OF ALL RESPONSES TO PHQ9 QUESTIONS 1 & 2: 1
SUM OF ALL RESPONSES TO PHQ QUESTIONS 1-9: 3

## 2025-01-23 NOTE — ASSESSMENT & PLAN NOTE
Chronic, at goal (stable), continue current treatment plan    Orders:    Lipid Panel; Future    Comprehensive Metabolic Panel; Future

## 2025-01-24 ENCOUNTER — TELEPHONE (OUTPATIENT)
Dept: ADMINISTRATIVE | Age: 45
End: 2025-01-24

## 2025-01-24 DIAGNOSIS — E78.01 FAMILIAL HYPERCHOLESTEROLEMIA: Primary | ICD-10-CM

## 2025-01-24 NOTE — TELEPHONE ENCOUNTER
Submitted PA for Wegovy 0.25MG/0.5ML auto-injectors  Via CMM Key: [id number] STATUS: PENDING.    Follow up done daily; if no decision with in three days we will refax.  If another three days goes by with no decision will call the insurance for status.

## 2025-01-27 NOTE — TELEPHONE ENCOUNTER
Called patient and informed her.  She is ok with try the compounding pharmacy and can wait until PCP is back.    166

## 2025-01-27 NOTE — TELEPHONE ENCOUNTER
The medication was DENIED; DENIAL letter is uploaded to [employer].        Drug is not covered by the plan ( Plan Exclusion)       Note :        If you want an APPEAL; please note in this encounter what new information you would like to APPEAL with.  Once complete route back to PA POOL.    If this requires a response please respond to the pool ( P [facility] MEDICATION PRE-AUTH).      Thank you please advise patient.

## 2025-01-27 NOTE — TELEPHONE ENCOUNTER
Please let patient know that unfortunately, wegovy and similar medications are not covered under her insurance carrier. It looks like Cathy had discussed the compounded version, please let us know if interested. Cathy is out office today but can address when she returns.

## 2025-01-29 DIAGNOSIS — E66.813 CLASS 3 SEVERE OBESITY DUE TO EXCESS CALORIES WITH SERIOUS COMORBIDITY AND BODY MASS INDEX (BMI) OF 40.0 TO 44.9 IN ADULT: ICD-10-CM

## 2025-01-29 DIAGNOSIS — E66.01 CLASS 3 SEVERE OBESITY DUE TO EXCESS CALORIES WITH SERIOUS COMORBIDITY AND BODY MASS INDEX (BMI) OF 40.0 TO 44.9 IN ADULT: ICD-10-CM

## 2025-02-10 NOTE — TELEPHONE ENCOUNTER
I received email back from [facility] stating they never received the order. ONPO order and Pressure change order faxed to [facility].  Please watch for results.

## 2025-02-11 NOTE — TELEPHONE ENCOUNTER
Viki from [facility] emailed me and said ONPO was being delivered to pt yesterday and they will pick it up this week and send us the results ASAP.

## 2025-02-14 NOTE — TELEPHONE ENCOUNTER
2/12/2025 overnight pulse oximetry on auto CPAP 11-16 cm H2O showed under 88% for 0 minutes    O2 appears good on current CPAP pressure

## 2025-02-20 NOTE — TELEPHONE ENCOUNTER
CPAP load report from 1/21/2025 - 2/19/2025 on auto CPAP 11-16 cm H2O reviewed.  Compliance has improved and is good 83%.  AHI is good 0.8

## 2025-04-02 ENCOUNTER — HOSPITAL ENCOUNTER (OUTPATIENT)
Dept: WOMENS IMAGING | Age: 45
Discharge: HOME | End: 2025-04-02
Payer: COMMERCIAL

## 2025-04-02 VITALS — WEIGHT: 270 LBS | HEIGHT: 68 IN | BODY MASS INDEX: 40.92 KG/M2

## 2025-04-02 DIAGNOSIS — R92.8 ABNORMAL MAMMOGRAM OF LEFT BREAST: Primary | ICD-10-CM

## 2025-04-02 DIAGNOSIS — R92.8 ABNORMAL MAMMOGRAM OF LEFT BREAST: ICD-10-CM

## 2025-04-02 PROCEDURE — G0279 TOMOSYNTHESIS, MAMMO: HCPCS

## 2025-05-09 ENCOUNTER — TELEPHONE (OUTPATIENT)
Dept: FAMILY MEDICINE CLINIC | Age: 45
End: 2025-05-09

## 2025-05-09 NOTE — TELEPHONE ENCOUNTER
Patient called states she is having COLLEEN edema over last several weeks. States it was only happening in the am but now it is all day long. States she is retaining     States she is not having any seeping   Does not take a dieretic or htn medication   States she has not checked her bp    Denies any sgins of HTN , dizziness , HA or ringing of the ears , SOB     Advised to try compression socks, elevate her legs, avoid sodium .     Patient aware , apt made for Friday per her request   Advised proceed to the ER with any changes of sx, chest pain , SOB

## 2025-05-16 ENCOUNTER — OFFICE VISIT (OUTPATIENT)
Dept: FAMILY MEDICINE CLINIC | Age: 45
End: 2025-05-16
Payer: COMMERCIAL

## 2025-05-16 VITALS
HEART RATE: 82 BPM | BODY MASS INDEX: 40.81 KG/M2 | SYSTOLIC BLOOD PRESSURE: 130 MMHG | WEIGHT: 268.4 LBS | OXYGEN SATURATION: 98 % | DIASTOLIC BLOOD PRESSURE: 80 MMHG | RESPIRATION RATE: 18 BRPM

## 2025-05-16 DIAGNOSIS — R60.0 LOCALIZED EDEMA: Primary | ICD-10-CM

## 2025-05-16 PROCEDURE — 3075F SYST BP GE 130 - 139MM HG: CPT | Performed by: NURSE PRACTITIONER

## 2025-05-16 PROCEDURE — G8417 CALC BMI ABV UP PARAM F/U: HCPCS | Performed by: NURSE PRACTITIONER

## 2025-05-16 PROCEDURE — 3079F DIAST BP 80-89 MM HG: CPT | Performed by: NURSE PRACTITIONER

## 2025-05-16 PROCEDURE — 1036F TOBACCO NON-USER: CPT | Performed by: NURSE PRACTITIONER

## 2025-05-16 PROCEDURE — G8427 DOCREV CUR MEDS BY ELIG CLIN: HCPCS | Performed by: NURSE PRACTITIONER

## 2025-05-16 PROCEDURE — 99214 OFFICE O/P EST MOD 30 MIN: CPT | Performed by: NURSE PRACTITIONER

## 2025-05-16 RX ORDER — HYDROCHLOROTHIAZIDE 25 MG/1
25 TABLET ORAL EVERY MORNING
Qty: 30 TABLET | Refills: 1 | Status: SHIPPED | OUTPATIENT
Start: 2025-05-16

## 2025-05-16 NOTE — PROGRESS NOTES
Bertin Ham (:  1980) is a 44 y.o. female,Established patient, here for evaluation of the following chief complaint(s):  Edema (Bilateral feel and ankles) and Shortness of Breath         Assessment & Plan  Localized edema       Orders:    hydroCHLOROthiazide (HYDRODIURIL) 25 MG tablet; Take 1 tablet by mouth every morning    Basic Metabolic Panel; Future    Have BMP drawn in 1 week.     Increase water intake    Limit sodium to less than 2000 mg daily    Knee high elastic stockings, at least during the night in warm weather    Return if no improvement or worsens      No follow-ups on file.       Subjective   HPI    Noted swelling in lower legs few months ago but was intermittent. Now that is wearing flip flop shoes had noted to be more consistent. Resolves over night. Drinking water. Not limiting salt intake.   When asked speaks of SOB but this is not consistent.   Weight stable compared to last office visit 2025.   Review of Systems   All other systems reviewed and are negative.         Objective   Physical Exam  Constitutional:       Appearance: Normal appearance. She is well-developed.   HENT:      Head: Normocephalic and atraumatic.   Neck:      Thyroid: No thyromegaly.   Cardiovascular:      Rate and Rhythm: Normal rate and regular rhythm.   Pulmonary:      Effort: Pulmonary effort is normal.      Breath sounds: Normal breath sounds. No wheezing, rhonchi or rales.   Abdominal:      General: Bowel sounds are normal.      Palpations: Abdomen is soft.   Musculoskeletal:         General: Swelling present. Normal range of motion.      Cervical back: Normal range of motion and neck supple.      Comments: Bilateral lower legs with generalized enlargement, non pitting.    Skin:     General: Skin is warm.   Neurological:      Mental Status: She is alert and oriented to person, place, and time.   Psychiatric:         Behavior: Behavior normal.                  An electronic signature was used to

## 2025-06-04 DIAGNOSIS — R60.0 LOCALIZED EDEMA: ICD-10-CM

## 2025-06-05 ENCOUNTER — RESULTS FOLLOW-UP (OUTPATIENT)
Dept: FAMILY MEDICINE CLINIC | Age: 45
End: 2025-06-05

## 2025-06-05 LAB
ANION GAP SERPL CALCULATED.3IONS-SCNC: 11 MMOL/L (ref 3–16)
BUN SERPL-MCNC: 11 MG/DL (ref 7–20)
CALCIUM SERPL-MCNC: 9.7 MG/DL (ref 8.3–10.6)
CHLORIDE SERPL-SCNC: 99 MMOL/L (ref 99–110)
CO2 SERPL-SCNC: 28 MMOL/L (ref 21–32)
CREAT SERPL-MCNC: 0.7 MG/DL (ref 0.6–1.1)
GFR SERPLBLD CREATININE-BSD FMLA CKD-EPI: >90 ML/MIN/{1.73_M2}
GLUCOSE SERPL-MCNC: 118 MG/DL (ref 70–99)
POTASSIUM SERPL-SCNC: 4.1 MMOL/L (ref 3.5–5.1)
SODIUM SERPL-SCNC: 138 MMOL/L (ref 136–145)

## 2025-07-26 DIAGNOSIS — R60.0 LOCALIZED EDEMA: ICD-10-CM

## 2025-07-28 RX ORDER — HYDROCHLOROTHIAZIDE 25 MG/1
25 TABLET ORAL EVERY MORNING
Qty: 30 TABLET | Refills: 5 | Status: SHIPPED | OUTPATIENT
Start: 2025-07-28

## 2025-07-28 NOTE — TELEPHONE ENCOUNTER
Refill Request     CONFIRM preferred pharmacy with the patient.    If Mail Order Rx - Pend for 90 day refill.      Last Seen: Last Seen Department: 5/16/2025  Last Seen by PCP: 5/16/2025    Last Written: 5/16/2025    If no future appointment scheduled:  Review the last OV with PCP and review information for follow-up visit,  Route STAFF MESSAGE with patient name to the  Pool for scheduling with the following information:            -  Timing of next visit           -  Visit type ie Physical, OV, etc           -  Diagnoses/Reason ie. COPD, HTN - Do not use MEDICATION, Follow-up or CHECK UP - Give reason for visit      Next Appointment:   Future Appointments   Date Time Provider Department Center   1/15/2026 11:20 AM Lea Reyna APRN - CNP [facility]   4/2/2026  9:30 AM [facility]       Message sent to  to schedule appt with patient?  NO      Requested Prescriptions     Pending Prescriptions Disp Refills    hydroCHLOROthiazide (HYDRODIURIL) 25 MG tablet [Pharmacy Med Name: hydroCHLOROthiazide 25 MG TABLET] 30 tablet 1     Sig: TAKE 1 TABLET BY MOUTH EVERY MORNING

## 2025-08-12 DIAGNOSIS — R60.0 LOCALIZED EDEMA: ICD-10-CM

## 2025-08-12 RX ORDER — HYDROCHLOROTHIAZIDE 25 MG/1
25 TABLET ORAL EVERY MORNING
Qty: 30 TABLET | Refills: 5 | Status: CANCELLED | OUTPATIENT
Start: 2025-08-12

## 2025-09-06 ENCOUNTER — OFFICE VISIT (OUTPATIENT)
Dept: URGENT CARE | Age: 45
End: 2025-09-06

## 2025-09-06 VITALS
HEIGHT: 68 IN | HEART RATE: 88 BPM | WEIGHT: 259.6 LBS | SYSTOLIC BLOOD PRESSURE: 138 MMHG | TEMPERATURE: 98.5 F | OXYGEN SATURATION: 97 % | BODY MASS INDEX: 39.34 KG/M2 | DIASTOLIC BLOOD PRESSURE: 84 MMHG

## 2025-09-06 DIAGNOSIS — M54.42 ACUTE LEFT-SIDED LOW BACK PAIN WITH LEFT-SIDED SCIATICA: ICD-10-CM

## 2025-09-06 DIAGNOSIS — M54.32 SCIATICA OF LEFT SIDE: ICD-10-CM

## 2025-09-06 DIAGNOSIS — S39.012A STRAIN OF MUSCLE, FASCIA AND TENDON OF LOWER BACK, INITIAL ENCOUNTER: Primary | ICD-10-CM

## 2025-09-06 RX ORDER — METHYLPREDNISOLONE 4 MG/1
TABLET ORAL
Qty: 1 KIT | Refills: 0 | Status: SHIPPED | OUTPATIENT
Start: 2025-09-06 | End: 2025-09-12

## 2025-09-06 ASSESSMENT — ENCOUNTER SYMPTOMS
VOMITING: 0
NAUSEA: 0
SHORTNESS OF BREATH: 0
DIARRHEA: 0
ABDOMINAL PAIN: 0
EYE PAIN: 0

## (undated) DEVICE — GAUZE,SPONGE,NW,2"X2",4PLY,NS,LF,8000/CS: Brand: MEDLINE

## (undated) DEVICE — TROCAR ENDOSCP L100MM DIA11MM STBL SL BLDELSS DISP ENDOPATH

## (undated) DEVICE — SUTURE MCRYL + SZ 4-0 L18IN ABSRB UD L19MM PS-2 3/8 CIR MCP496G

## (undated) DEVICE — 4.5 MM INCISOR PLUS STRAIGHT                                    BLADES, POWER/EP-1, VIOLET, PACKAGED                                    6 PER BOX, STERILE

## (undated) DEVICE — SUTURE MCRYL SZ 4-0 L18IN ABSRB UD L19MM PS-2 3/8 CIR PRIM Y496G

## (undated) DEVICE — GLOVE SURG SZ 75 L12IN FNGR THK94MIL STD WHT LTX FREE

## (undated) DEVICE — TROCAR: Brand: KII SLEEVE

## (undated) DEVICE — Z DISCONTINUED APPLICATOR SURG PREP 0.35OZ 2% CHG 70% ISO ALC W/ HI LT

## (undated) DEVICE — TUBING PMP IRRIG GOFLO

## (undated) DEVICE — GOWN SIRUS NONREIN XL W/TWL: Brand: MEDLINE INDUSTRIES, INC.

## (undated) DEVICE — SUTURE VCRL SZ 3-0 L18IN ABSRB UD L26MM SH 1/2 CIR J864D

## (undated) DEVICE — SOLUTION IRRIG 3000ML 0.9% SOD CHL ARTHROMATIC PLAS CONT

## (undated) DEVICE — SOLUTION IV IRRIG 500ML 0.9% SODIUM CHL 2F7123

## (undated) DEVICE — 3M™ TEGADERM™ HIGH PERFORMANCE FOAM ADHESIVE DRESSING, 90615, MINI WRAP, 2-3/4 IN X 2-3/4 IN, 10/CT 4CT/CASE: Brand: 3M™ TEGADERM™

## (undated) DEVICE — TROCAR: Brand: KII® SLEEVE

## (undated) DEVICE — PACK PROCEDURE SURG SURGERYARTHROSCOPY KNEE

## (undated) DEVICE — MAJOR SET UP PK

## (undated) DEVICE — GLOVE ORANGE PI 7 1/2   MSG9075

## (undated) DEVICE — MINOR SET UP PK

## (undated) DEVICE — PAD,NON-ADHERENT,3X8,STERILE,LF,1/PK: Brand: MEDLINE

## (undated) DEVICE — MANIFOLD SURG NEPTUNE WST MGMT

## (undated) DEVICE — DRAPE,UNDERBUTTOCKS,PCH,STERILE: Brand: MEDLINE

## (undated) DEVICE — GLOVE ORTHO 7 1/2   MSG9475

## (undated) DEVICE — PACK PROC LAP II AURORA

## (undated) DEVICE — ELECTRODE PT RET AD L9FT HI MOIST COND ADH HYDRGEL CORDED

## (undated) DEVICE — SUTURE PROL SZ 2-0 L18IN NONABSORBABLE BLU FS L26MM 3/8 CIR 8685H

## (undated) DEVICE — NEEDLE HYPO 25GA L1.5IN BLU POLYPR HUB S STL REG BVL STR

## (undated) DEVICE — Z CONVERTED USE 2273232 BANDAGE COMPR W6INXL11YD E KNIT DBL SELF CLSR EZE-BAND

## (undated) DEVICE — PENCIL SMK EVAC ALL IN 1 DSGN ENH VISIBILITY IMPROVED AIR

## (undated) DEVICE — PADDING CAST N ADH 12X6 IN CRIMPED FINISH 100% COTTON WBRLII

## (undated) DEVICE — Z DUP USE 2270994 CATHETER URETH 14FR L16IN RED RUB INTMIT RND HLLW TIP 2 OPP

## (undated) DEVICE — CATHETER IV 20GA L1.25IN PNK FEP SFTY STR HUB RADPQ DISP

## (undated) DEVICE — SURGICAL PROCEDURE PACK IV U-BAR

## (undated) DEVICE — NEEDLE INSUF L120MM DIA2MM DISP FOR PNEUMOPERI ENDOPATH

## (undated) DEVICE — SMARTGOWN BREATHABLE SURGICAL GOWN: Brand: CONVERTORS

## (undated) DEVICE — Z DUP USE 2257490 ADHESIVE SKIN CLSRE 036ML TPCL 2CTL CNCRLTE HIGH VSCSTY DRMB

## (undated) DEVICE — SYRINGE MED 10ML LUERLOCK TIP W/O SFTY DISP

## (undated) DEVICE — ZIMMER® A.T.S. CYCLINDRICAL TOURNIQUET CUFF, SINGLE PORT, SINGLE BLADDER 30 IN. 76 CM)